# Patient Record
Sex: MALE | Race: WHITE | NOT HISPANIC OR LATINO | Employment: FULL TIME | URBAN - METROPOLITAN AREA
[De-identification: names, ages, dates, MRNs, and addresses within clinical notes are randomized per-mention and may not be internally consistent; named-entity substitution may affect disease eponyms.]

---

## 2017-06-12 ENCOUNTER — HISTORICAL (OUTPATIENT)
Dept: ADMINISTRATIVE | Facility: HOSPITAL | Age: 52
End: 2017-06-12

## 2017-06-12 LAB
ALBUMIN SERPL-MCNC: 4 GM/DL (ref 3.4–5)
ALBUMIN/GLOB SERPL: 1.3 {RATIO}
ALP SERPL-CCNC: 80 UNIT/L (ref 50–136)
ALT SERPL-CCNC: 67 UNIT/L (ref 12–78)
AST SERPL-CCNC: 22 UNIT/L (ref 15–37)
BILIRUB SERPL-MCNC: 0.6 MG/DL (ref 0.2–1)
BILIRUBIN DIRECT+TOT PNL SERPL-MCNC: 0.2 MG/DL (ref 0–0.2)
BILIRUBIN DIRECT+TOT PNL SERPL-MCNC: 0.4 MG/DL (ref 0–0.8)
BUN SERPL-MCNC: 15 MG/DL (ref 7–18)
CALCIUM SERPL-MCNC: 9 MG/DL (ref 8.5–10.1)
CHLORIDE SERPL-SCNC: 104 MMOL/L (ref 98–107)
CHOLEST SERPL-MCNC: 179 MG/DL (ref 0–200)
CHOLEST/HDLC SERPL: 2.8 {RATIO} (ref 0–5)
CO2 SERPL-SCNC: 26 MMOL/L (ref 21–32)
CREAT SERPL-MCNC: 1.11 MG/DL (ref 0.7–1.3)
EST. AVERAGE GLUCOSE BLD GHB EST-MCNC: 140 MG/DL
GLOBULIN SER-MCNC: 3.1 GM/DL (ref 2.4–3.5)
GLUCOSE SERPL-MCNC: 173 MG/DL (ref 74–106)
HBA1C MFR BLD: 6.5 % (ref 4.2–6.3)
HDLC SERPL-MCNC: 64 MG/DL (ref 35–60)
LDLC SERPL CALC-MCNC: 100 MG/DL (ref 0–129)
POTASSIUM SERPL-SCNC: 4.5 MMOL/L (ref 3.5–5.1)
PROT SERPL-MCNC: 7.1 GM/DL (ref 6.4–8.2)
SODIUM SERPL-SCNC: 137 MMOL/L (ref 136–145)
TRIGL SERPL-MCNC: 76 MG/DL (ref 30–150)
VLDLC SERPL CALC-MCNC: 15 MG/DL

## 2018-02-12 ENCOUNTER — HOSPITAL ENCOUNTER (OUTPATIENT)
Dept: EMERGENCY MEDICINE | Facility: HOSPITAL | Age: 53
End: 2018-02-12
Attending: INTERNAL MEDICINE | Admitting: INTERNAL MEDICINE

## 2018-02-12 LAB
ABS NEUT (OLG): 5.27 X10(3)/MCL (ref 2.1–9.2)
ALBUMIN SERPL-MCNC: 3.9 GM/DL (ref 3.4–5)
ALBUMIN/GLOB SERPL: 1.1 RATIO (ref 1.1–2)
ALP SERPL-CCNC: 73 UNIT/L (ref 50–136)
ALT SERPL-CCNC: 64 UNIT/L (ref 12–78)
AST SERPL-CCNC: 27 UNIT/L (ref 15–37)
BASOPHILS # BLD AUTO: 0.1 X10(3)/MCL (ref 0–0.2)
BASOPHILS NFR BLD AUTO: 1 %
BILIRUB SERPL-MCNC: 1.1 MG/DL (ref 0.2–1)
BILIRUBIN DIRECT+TOT PNL SERPL-MCNC: 0.1 MG/DL (ref 0–0.5)
BILIRUBIN DIRECT+TOT PNL SERPL-MCNC: 1 MG/DL (ref 0–0.8)
BUN SERPL-MCNC: 12 MG/DL (ref 7–18)
CALCIUM SERPL-MCNC: 9.4 MG/DL (ref 8.5–10.1)
CHLORIDE SERPL-SCNC: 100 MMOL/L (ref 98–107)
CK SERPL-CCNC: 123 UNIT/L (ref 39–308)
CO2 SERPL-SCNC: 29 MMOL/L (ref 21–32)
CREAT SERPL-MCNC: 0.93 MG/DL (ref 0.7–1.3)
EOSINOPHIL # BLD AUTO: 0.3 X10(3)/MCL (ref 0–0.9)
EOSINOPHIL NFR BLD AUTO: 3 %
ERYTHROCYTE [DISTWIDTH] IN BLOOD BY AUTOMATED COUNT: 13.3 % (ref 11.5–17)
GLOBULIN SER-MCNC: 3.7 GM/DL (ref 2.4–3.5)
GLUCOSE SERPL-MCNC: 133 MG/DL (ref 74–106)
HCT VFR BLD AUTO: 44.7 % (ref 42–52)
HGB BLD-MCNC: 15.1 GM/DL (ref 14–18)
LYMPHOCYTES # BLD AUTO: 1.6 X10(3)/MCL (ref 0.6–4.6)
LYMPHOCYTES NFR BLD AUTO: 20 %
MCH RBC QN AUTO: 29.7 PG (ref 27–31)
MCHC RBC AUTO-ENTMCNC: 33.8 GM/DL (ref 33–36)
MCV RBC AUTO: 87.8 FL (ref 80–94)
MONOCYTES # BLD AUTO: 0.7 X10(3)/MCL (ref 0.1–1.3)
MONOCYTES NFR BLD AUTO: 9 %
NEUTROPHILS # BLD AUTO: 5.27 X10(3)/MCL (ref 2.1–9.2)
NEUTROPHILS NFR BLD AUTO: 67 %
PLATELET # BLD AUTO: 249 X10(3)/MCL (ref 130–400)
PMV BLD AUTO: 10.5 FL (ref 9.4–12.4)
POC TROPONIN: 0 NG/ML (ref 0–0.08)
POC TROPONIN: 0.01 NG/ML (ref 0–0.08)
POTASSIUM SERPL-SCNC: 3.7 MMOL/L (ref 3.5–5.1)
PROT SERPL-MCNC: 7.6 GM/DL (ref 6.4–8.2)
RBC # BLD AUTO: 5.09 X10(6)/MCL (ref 4.7–6.1)
SODIUM SERPL-SCNC: 136 MMOL/L (ref 136–145)
TROPONIN I SERPL-MCNC: 0.02 NG/ML (ref 0.02–0.49)
WBC # SPEC AUTO: 7.9 X10(3)/MCL (ref 4.5–11.5)

## 2018-02-26 ENCOUNTER — HISTORICAL (OUTPATIENT)
Dept: LAB | Facility: HOSPITAL | Age: 53
End: 2018-02-26

## 2018-02-26 LAB
BILIRUB SERPL-MCNC: NEGATIVE MG/DL
BLOOD URINE, POC: NEGATIVE
CLARITY, POC UA: CLEAR
COLOR, POC UA: YELLOW
CREAT UR-MCNC: 32.9 MG/DL
GLUCOSE UR QL STRIP: NORMAL
KETONES UR QL STRIP: NEGATIVE
LEUKOCYTE EST, POC UA: NEGATIVE
MICROALBUMIN UR-MCNC: <0.5 MG/DL
MICROALBUMIN/CREAT RATIO PNL UR: <15.2 MG/GM CR (ref 0–30)
NITRITE, POC UA: NEGATIVE
PH, POC UA: 5
PROTEIN, POC: NEGATIVE
SPECIFIC GRAVITY, POC UA: 1
UROBILINOGEN, POC UA: NORMAL

## 2018-10-01 ENCOUNTER — HISTORICAL (OUTPATIENT)
Dept: LAB | Facility: HOSPITAL | Age: 53
End: 2018-10-01

## 2018-10-01 LAB
BUN SERPL-MCNC: 18 MG/DL (ref 7–18)
CALCIUM SERPL-MCNC: 9.1 MG/DL (ref 8.5–10.1)
CHLORIDE SERPL-SCNC: 105 MMOL/L (ref 98–107)
CO2 SERPL-SCNC: 27 MMOL/L (ref 21–32)
CREAT SERPL-MCNC: 1.12 MG/DL (ref 0.7–1.3)
CREAT/UREA NIT SERPL: 16.1
GLUCOSE SERPL-MCNC: 171 MG/DL (ref 74–106)
POTASSIUM SERPL-SCNC: 4.5 MMOL/L (ref 3.5–5.1)
SODIUM SERPL-SCNC: 139 MMOL/L (ref 136–145)

## 2019-02-19 ENCOUNTER — HISTORICAL (OUTPATIENT)
Dept: LAB | Facility: HOSPITAL | Age: 54
End: 2019-02-19

## 2019-02-19 LAB
ABS NEUT (OLG): 3.32 X10(3)/MCL (ref 2.1–9.2)
ALBUMIN SERPL-MCNC: 4.1 GM/DL (ref 3.4–5)
ALBUMIN/GLOB SERPL: 1.2 RATIO (ref 1.1–2)
ALP SERPL-CCNC: 82 UNIT/L (ref 46–116)
ALT SERPL-CCNC: 50 UNIT/L (ref 12–78)
APPEARANCE, UA: CLEAR
AST SERPL-CCNC: 25 UNIT/L (ref 15–37)
BACTERIA SPEC CULT: ABNORMAL
BASOPHILS # BLD AUTO: 0 X10(3)/MCL (ref 0–0.2)
BASOPHILS NFR BLD AUTO: 1 %
BILIRUB SERPL-MCNC: 0.8 MG/DL (ref 0.2–1)
BILIRUB UR QL STRIP: NEGATIVE
BILIRUBIN DIRECT+TOT PNL SERPL-MCNC: 0.2 MG/DL (ref 0–0.2)
BILIRUBIN DIRECT+TOT PNL SERPL-MCNC: 0.6 MG/DL (ref 0–0.8)
BUN SERPL-MCNC: 17.7 MG/DL (ref 7–18)
CALCIUM SERPL-MCNC: 9.4 MG/DL (ref 8.5–10.1)
CHLORIDE SERPL-SCNC: 101 MMOL/L (ref 98–107)
CHOLEST SERPL-MCNC: 175 MG/DL (ref 0–200)
CHOLEST/HDLC SERPL: 2.8 {RATIO} (ref 0–5)
CO2 SERPL-SCNC: 25.1 MMOL/L (ref 21–32)
COLOR UR: YELLOW
CREAT SERPL-MCNC: 1.05 MG/DL (ref 0.6–1.3)
CREAT UR-MCNC: 60.9 MG/DL (ref 30–125)
CRP SERPL-MCNC: <0.2 MG/DL (ref 0–0.9)
EOSINOPHIL # BLD AUTO: 0.2 X10(3)/MCL (ref 0–0.9)
EOSINOPHIL NFR BLD AUTO: 4 %
ERYTHROCYTE [DISTWIDTH] IN BLOOD BY AUTOMATED COUNT: 13.3 % (ref 11.5–17)
ERYTHROCYTE [SEDIMENTATION RATE] IN BLOOD: 6 MM/HR (ref 0–15)
EST. AVERAGE GLUCOSE BLD GHB EST-MCNC: 134 MG/DL
GLOBULIN SER-MCNC: 3.3 GM/DL (ref 2.4–3.5)
GLUCOSE (UA): >=1000
GLUCOSE SERPL-MCNC: 171 MG/DL (ref 74–106)
HBA1C MFR BLD: 6.3 % (ref 4.5–6.2)
HCT VFR BLD AUTO: 46.8 % (ref 42–52)
HDLC SERPL-MCNC: 62 MG/DL (ref 40–60)
HGB BLD-MCNC: 15.5 GM/DL (ref 14–18)
HGB UR QL STRIP: NEGATIVE
IMM GRANULOCYTES # BLD AUTO: 0.04 % (ref 0–0.02)
IMM GRANULOCYTES NFR BLD AUTO: 0.8 % (ref 0–0.43)
KETONES UR QL STRIP: NEGATIVE
LDLC SERPL CALC-MCNC: 103 MG/DL (ref 0–129)
LEUKOCYTE ESTERASE UR QL STRIP: NEGATIVE
LYMPHOCYTES # BLD AUTO: 0.8 X10(3)/MCL (ref 0.6–4.6)
LYMPHOCYTES NFR BLD AUTO: 16 %
MCH RBC QN AUTO: 30.3 PG (ref 27–31)
MCHC RBC AUTO-ENTMCNC: 33.1 GM/DL (ref 33–36)
MCV RBC AUTO: 91.6 FL (ref 80–94)
MICROALBUMIN UR-MCNC: 6 MG/DL (ref 0–3)
MICROALBUMIN/CREAT RATIO PNL UR: 98.5 MG/GM CR (ref 0–30)
MONOCYTES # BLD AUTO: 0.5 X10(3)/MCL (ref 0.1–1.3)
MONOCYTES NFR BLD AUTO: 9 %
NEUTROPHILS # BLD AUTO: 3.32 X10(3)/MCL (ref 1.4–7.9)
NEUTROPHILS NFR BLD AUTO: 68 %
NITRITE UR QL STRIP: NEGATIVE
PH UR STRIP: 6 [PH] (ref 5–9)
PLATELET # BLD AUTO: 242 X10(3)/MCL (ref 130–400)
PMV BLD AUTO: 10.2 FL (ref 9.4–12.4)
POTASSIUM SERPL-SCNC: 4.3 MMOL/L (ref 3.5–5.1)
PROT SERPL-MCNC: 7.4 GM/DL (ref 6.4–8.2)
PROT UR QL STRIP: NEGATIVE
PSA SERPL-MCNC: 0.63 NG/ML (ref 0–4)
RBC # BLD AUTO: 5.11 X10(6)/MCL (ref 4.7–6.1)
RBC #/AREA URNS HPF: ABNORMAL /[HPF]
SODIUM SERPL-SCNC: 138 MMOL/L (ref 136–145)
SP GR UR STRIP: 1.02 (ref 1–1.03)
SQUAMOUS EPITHELIAL, UA: ABNORMAL
TRIGL SERPL-MCNC: 49 MG/DL
TSH SERPL-ACNC: 3.87 MIU/ML (ref 0.36–3.74)
UROBILINOGEN UR STRIP-ACNC: 0.2
VLDLC SERPL CALC-MCNC: 10 MG/DL
WBC # SPEC AUTO: 4.9 X10(3)/MCL (ref 4.5–11.5)
WBC #/AREA URNS HPF: ABNORMAL /[HPF]

## 2019-03-15 ENCOUNTER — HISTORICAL (OUTPATIENT)
Dept: LAB | Facility: HOSPITAL | Age: 54
End: 2019-03-15

## 2019-03-20 LAB — FINAL CULTURE: NO GROWTH

## 2019-04-30 ENCOUNTER — HISTORICAL (OUTPATIENT)
Dept: LAB | Facility: HOSPITAL | Age: 54
End: 2019-04-30

## 2019-04-30 LAB
APPEARANCE, UA: CLEAR
BILIRUB UR QL STRIP: NEGATIVE
CHOLEST SERPL-MCNC: 158 MG/DL (ref 0–200)
CHOLEST/HDLC SERPL: 2.7 {RATIO} (ref 0–5)
COLOR UR: YELLOW
CREAT UR-MCNC: 97.7 MG/DL (ref 30–125)
EST. AVERAGE GLUCOSE BLD GHB EST-MCNC: 146 MG/DL
GLUCOSE (UA): 500
HBA1C MFR BLD: 6.7 % (ref 4.5–6.2)
HDLC SERPL-MCNC: 58 MG/DL (ref 40–60)
HGB UR QL STRIP: NEGATIVE
KETONES UR QL STRIP: NEGATIVE
LDLC SERPL CALC-MCNC: 89 MG/DL (ref 0–129)
LEUKOCYTE ESTERASE UR QL STRIP: NEGATIVE
MICROALBUMIN UR-MCNC: 4.1 MG/DL (ref 0–3)
MICROALBUMIN/CREAT RATIO PNL UR: 42 MG/GM CR (ref 0–30)
NITRITE UR QL STRIP: NEGATIVE
PH UR STRIP: 5.5 [PH] (ref 5–9)
PROT UR QL STRIP: NEGATIVE
SP GR UR STRIP: 1.02 (ref 1–1.03)
TRIGL SERPL-MCNC: 56 MG/DL
UROBILINOGEN UR STRIP-ACNC: 0.2
VLDLC SERPL CALC-MCNC: 11 MG/DL

## 2019-12-19 ENCOUNTER — HISTORICAL (OUTPATIENT)
Dept: LAB | Facility: HOSPITAL | Age: 54
End: 2019-12-19

## 2019-12-19 LAB
ALBUMIN SERPL-MCNC: 4 GM/DL (ref 3.4–5)
ALP SERPL-CCNC: 108 UNIT/L (ref 46–116)
ALT SERPL-CCNC: 53 UNIT/L (ref 12–78)
APPEARANCE, UA: CLEAR
AST SERPL-CCNC: 26 UNIT/L (ref 15–37)
BACTERIA SPEC CULT: NORMAL
BILIRUB SERPL-MCNC: 0.6 MG/DL (ref 0.2–1)
BILIRUB UR QL STRIP: NEGATIVE
BILIRUBIN DIRECT+TOT PNL SERPL-MCNC: 0.19 MG/DL (ref 0–0.2)
BILIRUBIN DIRECT+TOT PNL SERPL-MCNC: 0.41 MG/DL (ref 0–0.8)
BUN SERPL-MCNC: 14.1 MG/DL (ref 7–18)
CALCIUM SERPL-MCNC: 9.5 MG/DL (ref 8.5–10.1)
CHLORIDE SERPL-SCNC: 102 MMOL/L (ref 98–107)
CHOLEST SERPL-MCNC: 189 MG/DL (ref 0–200)
CHOLEST/HDLC SERPL: 3.2 {RATIO} (ref 0–5)
CO2 SERPL-SCNC: 29.1 MMOL/L (ref 21–32)
COLOR UR: YELLOW
CREAT SERPL-MCNC: 1.1 MG/DL (ref 0.6–1.3)
CREAT UR-MCNC: 64.1 MG/DL (ref 30–125)
CREAT/UREA NIT SERPL: 13
CRP SERPL-MCNC: <0.2 MG/DL (ref 0–0.9)
ERYTHROCYTE [DISTWIDTH] IN BLOOD BY AUTOMATED COUNT: 13.1 % (ref 11.5–17)
ERYTHROCYTE [SEDIMENTATION RATE] IN BLOOD: 5 MM/HR (ref 0–15)
EST. AVERAGE GLUCOSE BLD GHB EST-MCNC: 154 MG/DL
GLUCOSE (UA): >=1000
GLUCOSE SERPL-MCNC: 217 MG/DL (ref 74–106)
HBA1C MFR BLD: 7 % (ref 4.5–6.2)
HCT VFR BLD AUTO: 49.2 % (ref 42–52)
HDLC SERPL-MCNC: 59 MG/DL (ref 40–60)
HGB BLD-MCNC: 16.2 GM/DL (ref 14–18)
HGB UR QL STRIP: NEGATIVE
KETONES UR QL STRIP: NEGATIVE
LDLC SERPL CALC-MCNC: 116 MG/DL (ref 0–129)
LEUKOCYTE ESTERASE UR QL STRIP: NEGATIVE
MCH RBC QN AUTO: 29.8 PG (ref 27–31)
MCHC RBC AUTO-ENTMCNC: 32.9 GM/DL (ref 33–36)
MCV RBC AUTO: 90.4 FL (ref 80–94)
MICROALBUMIN UR-MCNC: 8.6 MG/DL (ref 0–3)
MICROALBUMIN/CREAT RATIO PNL UR: 134.2 MG/GM CR (ref 0–30)
NITRITE UR QL STRIP: NEGATIVE
PH UR STRIP: 5.5 [PH] (ref 5–9)
PLATELET # BLD AUTO: 241 X10(3)/MCL (ref 130–400)
PMV BLD AUTO: 10 FL (ref 9.4–12.4)
POTASSIUM SERPL-SCNC: 5.3 MMOL/L (ref 3.5–5.1)
PROT SERPL-MCNC: 8 GM/DL (ref 6.4–8.2)
PROT UR QL STRIP: NEGATIVE
RBC # BLD AUTO: 5.44 X10(6)/MCL (ref 4.7–6.1)
RBC #/AREA URNS HPF: NORMAL /[HPF]
SODIUM SERPL-SCNC: 138 MMOL/L (ref 136–145)
SP GR UR STRIP: 1.01 (ref 1–1.03)
SQUAMOUS EPITHELIAL, UA: NORMAL
TRIGL SERPL-MCNC: 72 MG/DL
UROBILINOGEN UR STRIP-ACNC: 0.2
VLDLC SERPL CALC-MCNC: 14 MG/DL
WBC # SPEC AUTO: 7.1 X10(3)/MCL (ref 4.5–11.5)
WBC #/AREA URNS HPF: NORMAL /[HPF]

## 2020-01-27 ENCOUNTER — HISTORICAL (OUTPATIENT)
Dept: RADIOLOGY | Facility: HOSPITAL | Age: 55
End: 2020-01-27

## 2020-05-07 ENCOUNTER — HISTORICAL (OUTPATIENT)
Dept: RADIOLOGY | Facility: HOSPITAL | Age: 55
End: 2020-05-07

## 2020-05-07 LAB
ABS NEUT (OLG): 4 X10(3)/MCL (ref 2.1–9.2)
ALBUMIN SERPL-MCNC: 4.2 GM/DL (ref 3.4–5)
ALBUMIN/GLOB SERPL: 1.3 RATIO (ref 1.1–2)
ALP SERPL-CCNC: 84 UNIT/L (ref 46–116)
ALT SERPL-CCNC: 37 UNIT/L (ref 12–78)
AST SERPL-CCNC: 16 UNIT/L (ref 15–37)
BASOPHILS # BLD AUTO: 0 X10(3)/MCL (ref 0–0.2)
BASOPHILS NFR BLD AUTO: 1 %
BILIRUB SERPL-MCNC: 0.6 MG/DL (ref 0.2–1)
BILIRUBIN DIRECT+TOT PNL SERPL-MCNC: 0.22 MG/DL (ref 0–0.2)
BILIRUBIN DIRECT+TOT PNL SERPL-MCNC: 0.38 MG/DL (ref 0–0.8)
BUN SERPL-MCNC: 18.1 MG/DL (ref 7–18)
CALCIUM SERPL-MCNC: 10.1 MG/DL (ref 8.5–10.1)
CHLORIDE SERPL-SCNC: 103 MMOL/L (ref 98–107)
CO2 SERPL-SCNC: 28.3 MMOL/L (ref 21–32)
CREAT SERPL-MCNC: 1.1 MG/DL (ref 0.6–1.3)
EOSINOPHIL # BLD AUTO: 0.3 X10(3)/MCL (ref 0–0.9)
EOSINOPHIL NFR BLD AUTO: 5 %
ERYTHROCYTE [DISTWIDTH] IN BLOOD BY AUTOMATED COUNT: 12.8 % (ref 11.5–17)
EST. AVERAGE GLUCOSE BLD GHB EST-MCNC: 148 MG/DL
GLOBULIN SER-MCNC: 3.2 GM/DL (ref 2.4–3.5)
GLUCOSE SERPL-MCNC: 167 MG/DL (ref 74–106)
HBA1C MFR BLD: 6.8 % (ref 4.5–6.2)
HCT VFR BLD AUTO: 47.9 % (ref 42–52)
HGB BLD-MCNC: 16.1 GM/DL (ref 14–18)
IMM GRANULOCYTES # BLD AUTO: 0.04 % (ref 0–0.02)
IMM GRANULOCYTES NFR BLD AUTO: 0.7 % (ref 0–0.43)
LYMPHOCYTES # BLD AUTO: 1.1 X10(3)/MCL (ref 0.6–4.6)
LYMPHOCYTES NFR BLD AUTO: 18 %
MCH RBC QN AUTO: 30.1 PG (ref 27–31)
MCHC RBC AUTO-ENTMCNC: 33.6 GM/DL (ref 33–36)
MCV RBC AUTO: 89.7 FL (ref 80–94)
MONOCYTES # BLD AUTO: 0.6 X10(3)/MCL (ref 0.1–1.3)
MONOCYTES NFR BLD AUTO: 10 %
NEUTROPHILS # BLD AUTO: 4 X10(3)/MCL (ref 1.4–7.9)
NEUTROPHILS NFR BLD AUTO: 66 %
PLATELET # BLD AUTO: 204 X10(3)/MCL (ref 130–400)
PMV BLD AUTO: 10.6 FL (ref 9.4–12.4)
POTASSIUM SERPL-SCNC: 5.3 MMOL/L (ref 3.5–5.1)
PROT SERPL-MCNC: 7.4 GM/DL (ref 6.4–8.2)
RBC # BLD AUTO: 5.34 X10(6)/MCL (ref 4.7–6.1)
SODIUM SERPL-SCNC: 140 MMOL/L (ref 136–145)
WBC # SPEC AUTO: 6.1 X10(3)/MCL (ref 4.5–11.5)

## 2020-05-26 ENCOUNTER — HISTORICAL (OUTPATIENT)
Dept: ADMINISTRATIVE | Facility: HOSPITAL | Age: 55
End: 2020-05-26

## 2020-08-10 ENCOUNTER — HISTORICAL (OUTPATIENT)
Dept: ADMINISTRATIVE | Facility: HOSPITAL | Age: 55
End: 2020-08-10

## 2020-08-10 LAB
APPEARANCE, UA: CLEAR
BACTERIA SPEC CULT: ABNORMAL /HPF
BILIRUB UR QL STRIP: NEGATIVE
BUN SERPL-MCNC: 10.8 MG/DL (ref 8.4–25.7)
CALCIUM SERPL-MCNC: 9.4 MG/DL (ref 8.4–10.2)
CHLORIDE SERPL-SCNC: 104 MMOL/L (ref 98–107)
CHOLEST SERPL-MCNC: 153 MG/DL
CHOLEST/HDLC SERPL: 3 {RATIO} (ref 0–5)
CO2 SERPL-SCNC: 24 MMOL/L (ref 22–29)
COLOR UR: YELLOW
CREAT SERPL-MCNC: 0.96 MG/DL (ref 0.73–1.18)
CREAT UR-MCNC: 84.6 MG/DL (ref 58–161)
CREAT/UREA NIT SERPL: 11
EST. AVERAGE GLUCOSE BLD GHB EST-MCNC: 139.8 MG/DL
GLUCOSE (UA): ABNORMAL
GLUCOSE SERPL-MCNC: 127 MG/DL (ref 74–100)
HBA1C MFR BLD: 6.5 %
HDLC SERPL-MCNC: 56 MG/DL (ref 35–60)
HGB UR QL STRIP: NEGATIVE
KETONES UR QL STRIP: NEGATIVE
LDLC SERPL CALC-MCNC: 81 MG/DL (ref 50–140)
LEUKOCYTE ESTERASE UR QL STRIP: NEGATIVE
MICROALBUMIN UR-MCNC: 32.5 UG/ML
MICROALBUMIN/CREAT RATIO PNL UR: 38.4 MG/GM CR (ref 0–30)
NITRITE UR QL STRIP: NEGATIVE
PH UR STRIP: 5 [PH] (ref 5–9)
POTASSIUM SERPL-SCNC: 4.6 MMOL/L (ref 3.5–5.1)
PROT UR QL STRIP: NEGATIVE
RBC #/AREA URNS HPF: ABNORMAL /[HPF]
SODIUM SERPL-SCNC: 139 MMOL/L (ref 136–145)
SP GR UR STRIP: 1.03 (ref 1–1.03)
SQUAMOUS EPITHELIAL, UA: ABNORMAL
TRIGL SERPL-MCNC: 78 MG/DL (ref 34–140)
UROBILINOGEN UR STRIP-ACNC: 0.2
VLDLC SERPL CALC-MCNC: 16 MG/DL
WBC #/AREA URNS HPF: ABNORMAL /HPF

## 2020-10-05 ENCOUNTER — HISTORICAL (OUTPATIENT)
Dept: ADMINISTRATIVE | Facility: HOSPITAL | Age: 55
End: 2020-10-05

## 2020-10-07 LAB — FINAL CULTURE: NO GROWTH

## 2020-11-10 ENCOUNTER — HISTORICAL (OUTPATIENT)
Dept: ADMINISTRATIVE | Facility: HOSPITAL | Age: 55
End: 2020-11-10

## 2021-02-22 ENCOUNTER — HISTORICAL (OUTPATIENT)
Dept: RADIOLOGY | Facility: HOSPITAL | Age: 56
End: 2021-02-22

## 2021-02-22 LAB
ALBUMIN SERPL-MCNC: 4 GM/DL (ref 3.5–5)
ALBUMIN/GLOB SERPL: 1.5 RATIO (ref 1.1–2)
ALP SERPL-CCNC: 76 UNIT/L (ref 40–150)
ALT SERPL-CCNC: 49 UNIT/L (ref 0–55)
APPEARANCE, UA: CLEAR
AST SERPL-CCNC: 27 UNIT/L (ref 5–34)
BACTERIA SPEC CULT: ABNORMAL /HPF
BILIRUB SERPL-MCNC: 1 MG/DL
BILIRUB UR QL STRIP: NEGATIVE
BILIRUBIN DIRECT+TOT PNL SERPL-MCNC: 0.4 MG/DL (ref 0–0.5)
BILIRUBIN DIRECT+TOT PNL SERPL-MCNC: 0.6 MG/DL (ref 0–0.8)
BUN SERPL-MCNC: 13.2 MG/DL (ref 8.4–25.7)
CALCIUM SERPL-MCNC: 9 MG/DL (ref 8.4–10.2)
CHLORIDE SERPL-SCNC: 101 MMOL/L (ref 98–107)
CHOLEST SERPL-MCNC: 156 MG/DL
CHOLEST/HDLC SERPL: 3 {RATIO} (ref 0–5)
CO2 SERPL-SCNC: 27 MMOL/L (ref 22–29)
COLOR UR: YELLOW
CREAT SERPL-MCNC: 1.02 MG/DL (ref 0.73–1.18)
CREAT UR-MCNC: 167.9 MG/DL (ref 58–161)
EST. AVERAGE GLUCOSE BLD GHB EST-MCNC: 165.7 MG/DL
GLOBULIN SER-MCNC: 2.6 GM/DL (ref 2.4–3.5)
GLUCOSE (UA): ABNORMAL
GLUCOSE SERPL-MCNC: 206 MG/DL (ref 74–100)
HBA1C MFR BLD: 7.4 %
HDLC SERPL-MCNC: 50 MG/DL (ref 35–60)
HGB UR QL STRIP: NEGATIVE
KETONES UR QL STRIP: ABNORMAL
LDLC SERPL CALC-MCNC: 87 MG/DL (ref 50–140)
LEUKOCYTE ESTERASE UR QL STRIP: NEGATIVE
MICROALBUMIN UR-MCNC: 27.6 UG/ML
MICROALBUMIN/CREAT RATIO PNL UR: 16.4 MG/GM CR (ref 0–30)
NITRITE UR QL STRIP: NEGATIVE
PH UR STRIP: 5.5 [PH] (ref 5–9)
POTASSIUM SERPL-SCNC: 4.7 MMOL/L (ref 3.5–5.1)
PROT SERPL-MCNC: 6.6 GM/DL (ref 6.4–8.3)
PROT UR QL STRIP: NEGATIVE
RBC #/AREA URNS HPF: ABNORMAL /[HPF]
SODIUM SERPL-SCNC: 138 MMOL/L (ref 136–145)
SP GR UR STRIP: >=1.04 (ref 1–1.03)
SQUAMOUS EPITHELIAL, UA: ABNORMAL
TRIGL SERPL-MCNC: 97 MG/DL (ref 34–140)
UROBILINOGEN UR STRIP-ACNC: 0.2
VLDLC SERPL CALC-MCNC: 19 MG/DL
WBC #/AREA URNS HPF: ABNORMAL /HPF

## 2021-05-18 ENCOUNTER — HISTORICAL (OUTPATIENT)
Dept: ADMINISTRATIVE | Facility: HOSPITAL | Age: 56
End: 2021-05-18

## 2021-05-18 LAB
ALBUMIN SERPL-MCNC: 4.1 GM/DL (ref 3.5–5)
ALBUMIN/GLOB SERPL: 1.5 RATIO (ref 1.1–2)
ALP SERPL-CCNC: 122 UNIT/L (ref 40–150)
ALT SERPL-CCNC: 36 UNIT/L (ref 0–55)
APPEARANCE, UA: ABNORMAL
AST SERPL-CCNC: 19 UNIT/L (ref 5–34)
BACTERIA SPEC CULT: ABNORMAL /HPF
BILIRUB SERPL-MCNC: 0.9 MG/DL
BILIRUB UR QL STRIP: NEGATIVE
BILIRUBIN DIRECT+TOT PNL SERPL-MCNC: 0.4 MG/DL (ref 0–0.5)
BILIRUBIN DIRECT+TOT PNL SERPL-MCNC: 0.5 MG/DL (ref 0–0.8)
BUN SERPL-MCNC: 11.6 MG/DL (ref 8.4–25.7)
CALCIUM SERPL-MCNC: 9.4 MG/DL (ref 8.4–10.2)
CHLORIDE SERPL-SCNC: 99 MMOL/L (ref 98–107)
CO2 SERPL-SCNC: 25 MMOL/L (ref 22–29)
COLOR UR: ABNORMAL
CRC RECOMMENDATION EXT: NORMAL
CRC RECOMMENDATION EXT: NORMAL
CREAT SERPL-MCNC: 0.89 MG/DL (ref 0.73–1.18)
CREAT UR-MCNC: 273.4 MG/DL (ref 58–161)
EST. AVERAGE GLUCOSE BLD GHB EST-MCNC: 157.1 MG/DL
GLOBULIN SER-MCNC: 2.8 GM/DL (ref 2.4–3.5)
GLUCOSE (UA): ABNORMAL
GLUCOSE SERPL-MCNC: 181 MG/DL (ref 74–100)
HBA1C MFR BLD: 7.1 %
HGB UR QL STRIP: NEGATIVE
KETONES UR QL STRIP: ABNORMAL
LEUKOCYTE ESTERASE UR QL STRIP: NEGATIVE
MICROALBUMIN UR-MCNC: 69.7 UG/ML
MICROALBUMIN/CREAT RATIO PNL UR: 25.5 MG/GM CR (ref 0–30)
NITRITE UR QL STRIP: NEGATIVE
PH UR STRIP: 5 [PH] (ref 5–9)
POTASSIUM SERPL-SCNC: 4 MMOL/L (ref 3.5–5.1)
PROT SERPL-MCNC: 6.9 GM/DL (ref 6.4–8.3)
PROT UR QL STRIP: ABNORMAL
RBC #/AREA URNS HPF: ABNORMAL /[HPF]
SODIUM SERPL-SCNC: 137 MMOL/L (ref 136–145)
SP GR UR STRIP: 1.04 (ref 1–1.03)
SQUAMOUS EPITHELIAL, UA: ABNORMAL /HPF (ref 0–4)
UROBILINOGEN UR STRIP-ACNC: 0.2
WBC #/AREA URNS HPF: ABNORMAL /HPF

## 2021-07-20 ENCOUNTER — HISTORICAL (OUTPATIENT)
Dept: ADMINISTRATIVE | Facility: HOSPITAL | Age: 56
End: 2021-07-20

## 2021-07-20 LAB — POC CREATININE: 1.1 MG/DL (ref 0.6–1.3)

## 2021-12-08 LAB
LEFT EYE DM RETINOPATHY: NEGATIVE
LEFT EYE DM RETINOPATHY: NEGATIVE
RIGHT EYE DM RETINOPATHY: NEGATIVE
RIGHT EYE DM RETINOPATHY: NEGATIVE

## 2022-02-22 ENCOUNTER — HISTORICAL (OUTPATIENT)
Dept: RADIOLOGY | Facility: HOSPITAL | Age: 57
End: 2022-02-22

## 2022-02-22 ENCOUNTER — HISTORICAL (OUTPATIENT)
Dept: ADMINISTRATIVE | Facility: HOSPITAL | Age: 57
End: 2022-02-22

## 2022-04-11 ENCOUNTER — HISTORICAL (OUTPATIENT)
Dept: ADMINISTRATIVE | Facility: HOSPITAL | Age: 57
End: 2022-04-11
Payer: COMMERCIAL

## 2022-04-26 VITALS
WEIGHT: 220.44 LBS | DIASTOLIC BLOOD PRESSURE: 88 MMHG | HEIGHT: 72 IN | SYSTOLIC BLOOD PRESSURE: 132 MMHG | OXYGEN SATURATION: 98 % | BODY MASS INDEX: 29.86 KG/M2

## 2022-04-30 NOTE — OP NOTE
Patient:   Walter Crawford            MRN: [id number]            FIN: [id number]               Age:   54 years     Sex:  Male     :  1965   Associated Diagnoses:   None   Author:   Pedro Carballo MD      Preoperative Diagnosis:  Cataract Right eye    Postoperative Diagnosis:  Cataract Right eye    Procedure:  Phacoemulsification with intraocular lens implantation Right eye    Surgeon:  Pedro Carballo MD    Assistant:  JULIANN Guerrier    Anestheisa:  MAC    Complications:  None    The patient was brought to the [facility] [place] [facility].  A time out was performed.  The capsulotomy, lens fragmentation and limbal relaxing incisions were completed.  Then the patient was brought into the operating suite, where the patient was correctly identified as was the operative eye via timeout.  The patient was prepped and draped in a sterile ophthalmic fashion.  A lid speculum was placed in the operative eye and the microscope was brought into place.  A 1.0 mm paracentesis was then made at (12) o'clock.  The anterior chamber was filled with Endocoat.  A (temporal) clear corneal incision was made with a 2.4 mm keratome blade.  A 6 mm corneal marking ring was used to kathryn the cornea centered over the visual axis.  A 5.00 mm continuous curvilinear capsulorhexis was fashioned using a cystotome and microcapsular forceps.  Hydrodissection and hydrodelineation was performed with upreserved 1% Xylocaine.  The nucleus was then phacoemulsified with the Abbott phacoemulsification hand-piece with a total of (1) EFX.  The cortex was then removed with the I/A hand-piece.  The lens model (TFAT00) with a power of (19.5) was placed in the capsular bag.  The Helon was then removed from the eye with the I/A hand piece.  The anterior chamber was inflated and the wounds were hydrated with BSS.  The wounds were checked with Weck-Eliza sponges and found to be watertight.  The lid speculum was removed and topical antibiotics were placed on the  operative eye.  The patient was brought to PACU in good condition.

## 2022-04-30 NOTE — ED PROVIDER NOTES
Patient:   Walter Crawford            MRN: [id number]            FIN: [id number]               Age:   52 years     Sex:  Male     :  1965   Associated Diagnoses:   Chest pain   Author:   Ofelia Malhotra MD      Basic Information   Time seen: Date & time 2018 15:57:00.   History source: Patient.   Arrival mode: Private vehicle, walking.   History limitation: None.   Additional information: Patient's physician(s): David Pool MD, Chief Complaint from Nursing Triage Note : Chief Complaint   2018 15:25 CST      Chief Complaint           chest tightness  .      History of Present Illness   The patient presents with 53 yo cm with pmh DM, HTN presents from Dr. Pool office for cardiac workup d/t abnormal EKG in office. Pt states he has had cp x 1 month worsening x 1 week.  and     I, Dr. Malhotra, assumed care of this patient at 1631.  53 y/o CM with a history of anxiety, HTN, DM and smoking, presents to the ED with complaints of worsening, diffuse chest tightness and left-sided chest soreness, onset of 3 weeks worse since yesterday. Pt reports the pain fluctuates in intensity, is not worse with eating or movement and states he believes the pain is superficial, and not cardiac related. He says he last felt the pain driving to the ED, and denies any pain at present.  He is intermittently short of breath with the pain but not short of breath at present.  He states he is compliant with his blood pressure medications and denies taking any pain medication today. Pt denies any fever, nausea, vomiting, cough, lightheadedness or abdominal pain. .  The onset was 3  weeks ago.  The course/duration of symptoms is worsening and fluctuating in intensity.  Location: Generalized chest worse on left side. Radiating pain: none. The character of symptoms is tightness.  The degree at onset was minimal.  The degree at maximum was minimal.  The degree at present is none.  Exacerbating factors:   not movement or eating.  The relieving factor is none.  Risk factors consist of hypertension, diabetes mellitus and smoking.  Prior episodes: none.  Therapy today None.  Associated symptoms: denies nausea and denies vomiting.        Review of Systems   Constitutional symptoms:  No fever,    Skin symptoms:  No rash,    Eye symptoms:  No recent vision problems,    ENMT symptoms:  No sore throat,    Respiratory symptoms:  Shortness of breath (Intermittently, not currently), No cough,    Cardiovascular symptoms:  Chest pain, bilateral chest, mild pain, tightness, Left sided chest soreness, no syncope, no diaphoresis, no peripheral edema.    Gastrointestinal symptoms:  No abdominal pain, no nausea, no vomiting, no diarrhea, no constipation.    Genitourinary symptoms:  No dysuria, no hematuria.    Neurologic symptoms:  No headache, no dizziness.       Health Status   Allergies:    Allergic Reactions (Selected)  Severity Not Documented  Lisinopril- Eruption..   Medications:  (Selected)   Prescriptions  Prescribed  Contour Next EZ Test Strips: Contour Next EZ Test Strips, See Instructions, Check blood sugar daily, # 1 box(es), 3 Refill(s), Pharmacy: [facility] [id number]  Edarbyclor 40 mg-25 mg oral tablet: 1 tab(s), Oral, Daily, # 30 tab(s), 11 Refill(s), Pharmacy: [place], [place]  Glyxambi 10 mg-5 mg oral tablet: See Instructions, TAKE 1 TABLET BY MOUTH EVERY DAY, # 30 tab(s), 3 Refill(s), eRx: [facility] [id number]  PARoxetine 40 mg oral tablet: See Instructions, TAKE 1 TABLET BY MOUTH EVERY DAY, # 30 tab(s), 5 Refill(s), eRx: [facility] [id number]  Viagra 25 mg oral tablet: 25 mg = 1 tab(s), Oral, Daily, PRN PRN as needed for erectile dysfunction, 1 hour before sexual activity, # 10 tab(s), 0 Refill(s), Pharmacy: [facility] [id number]  atorvastatin 10 mg oral tablet: See Instructions, TAKE 1 TABLET BY MOUTH DAILY, # 30 tab(s), 5 Refill(s), eRx: [facility] [address]  buPROPion  300 mg/24 hours (XL) oral tablet, extended release: See Instructions, TAKE 1 TABLET BY MOUTH DAILY, # 30 tab(s), 3 Refill(s), eRx: [facility] [id number]  metFORMIN 1000 mg oral tablet: See Instructions, TAKE 1 TABLET BY MOUTH TWICE DAILY, # 60 tab(s), 2 Refill(s), eRx: [facility] [address]  traZODONE 50 mg oral tablet ( Desyrel ): See Instructions, TAKE 2 TABLETS BY MOUTH EVERY DAY AT BEDTIME, # 60 tab(s), 2 Refill(s), eRx: [facility] [address]  Documented Medications  Documented  cyanocobalamin: = 1 mL, Subcutaneous, qMonth, 0 Refill(s)  folic acid 1 mg oral tablet: 1 mg = 1 tab(s), Oral, Daily, # 30 tab(s), 0 Refill(s)  leflunomide 20 mg oral tablet: 20 mg = 1 tab(s), Oral, Daily, # 30 tab(s), 0 Refill(s).      Past Medical/ Family/ Social History   Medical history:    Resolved  HTN (hypertension) ([id number]):  Resolved.  DM (diabetes mellitus), type 2 ([id number]):  Resolved.  Depression ([id number]):  Resolved..   Surgical history:    Left ankle ([id number]) in  at 50 Years.  Gallbladder (001695682) on 2012 at 46 Years.  Carpal tunnel (572455673) in the month of 2012 at 46 Years.  Comments:  2015 16:37 - Contributor_system, [id number]_[email or web address]  2014 10:18:14 - Sujata Canres: both wrist  Colonoscopy (046369705) in  at 44 Years..   Family history:    Dementia  Father  Diabetes mellitus type 2  Mother (2, )  Depression  Sister  Hypertension.  Father  Diabetes mellitus.  Sister  .   Social history: Alcohol use: Regularly, Tobacco use: 20 cigarettes per day, Drug use: Denies.      Physical Examination               Vital Signs   Vital Signs   2018 15:25 CST      Temperature Oral          36.7 DegC                             Temperature Oral (calculated)             98.06 DegF                             Peripheral Pulse Rate     81 bpm                             Respiratory Rate          16 br/min                              SpO2                      96 %                             Systolic Blood Pressure   123 mmHg                             Diastolic Blood Pressure  81 mmHg  .   Measurements   2/12/2018 15:25 CST      Weight Dosing             105.25 kg                             Weight Measured           105.25 kg                             Weight Measured and Calculated in Lbs     232.03 lb                             Weight Estimated          105.25 kg                             Height/Length Dosing      177.80 cm                             Height/Length Measured    177.80 cm                             Height/Length Estimated   177.80 cm                             BSA Measured              2.28 m2                             BSA Estimated             2.28 m2                             Body Mass Index Estimated 33.29 kg/m2                             Body Mass Index Measured  33.29 kg/m2  .   Basic Oxygen Information   2/12/2018 15:25 CST      SpO2                      96 %  .   General:  Alert, no acute distress.    Skin:  Warm, dry, pink, intact.    Head:  Normocephalic, atraumatic.    Neck:  Supple, trachea midline.    Eye:  Extraocular movements are intact, normal conjunctiva.    Ears, nose, mouth and throat:  Oral mucosa moist.   Cardiovascular:  Regular rate and rhythm, Normal peripheral perfusion, No edema.    Respiratory:  Lungs are clear to auscultation, respirations are non-labored, breath sounds are equal, Symmetrical chest wall expansion.    Gastrointestinal:  Soft, Nontender, Non distended, Normal bowel sounds.    Back:  Normal range of motion.   Musculoskeletal:  Normal ROM, No calf edema, asymmetry, erythema, warmth, tenderness to palpation or palpable cords appreciated bilaterally.    Neurological:  Alert and oriented to person, place, time, and situation, No focal neurological deficit observed.    Psychiatric:  Cooperative.      Medical Decision Making   Differential Diagnosis:  Unstable angina,  angina, anxiety, atypical chest pain, pneumonia, gastroesophageal reflux disease, chronic obstructive pulmonary disease, bronchitis.    Rationale:  52-year-old male with history of hypertension and diabetes, smoker, presents for evaluation of bilateral chest tightness with intermittent shortness of breath.  Symptoms overall have been intermittent for several days.  He has never had a cardiac workup.  He is currently chest pain-free.  EKG sinus rhythm without acute ischemic changes.  Chest x-ray negative on my review for acute findings.  Screening cardiac workup initiated and is negative for acute abnormalities.  There is a mild elevation in bilirubin of unknown significance.  It will be followed as an inpatient.  He is admitted.   Documents reviewed:  Emergency department nurses' notes.   Orders  Launch Orders   Laboratory:  CPK (Order): Stat collect, 2/12/2018 16:00 CST, Blood, Lab Collect, Print Label By Order Location, 2/12/2018 16:00 CST  CBC w/ Auto Diff (Order): Now collect, 2/12/2018 16:00 CST, Blood, Lab Collect, Print Label By Order Location, 2/12/2018 16:00 CST  CMP (Order): Stat collect, 2/12/2018 16:00 CST, Blood, Lab Collect, Print Label By Order Location, 2/12/2018 16:00 CST  Troponin-I (Order): Stat collect, 2/12/2018 16:00 CST, Blood, Lab Collect, Print Label By Order Location, 2/12/2018 16:00 CST  POC iSTAT ER Troponin request (Order): Blood, Stat collect, Collected, 2/12/2018 16:00 CST by Adore Hernandez, Nurse collect, Print Label By Order Location  Radiology:  XR Chest 2 Views (Order): Stat, 2/12/2018 16:00 CST, Chest Pain, None, Stretcher, Rad Type, [facility]  Cardiology:  EKG Adult 12 Lead (Order): 2/12/2018 16:00 CST, -1, -1, 2/12/2018 16:00 CST.   Electrocardiogram:  Time 2/12/2018 16:00:00, rate 82, normal sinus rhythm, No ST-T changes, no ectopy, normal TN & QRS intervals, EP Interp.    Results review:  Lab results : Lab View   2/12/2018 16:17 CST      POC Troponin               0.01 ng/mL    2/12/2018 16:08 CST      Sodium Lvl                136 mmol/L                             Potassium Lvl             3.7 mmol/L                             Chloride                  100 mmol/L                             CO2                       29.0 mmol/L                             Calcium Lvl               9.4 mg/dL                             Glucose Lvl               133 mg/dL  HI                             BUN                       12.0 mg/dL                             Creatinine                0.93 mg/dL                             eGFR-AA                   >60 mL/min/1.73 m2  NA                             eGFR-LEO                  >60 mL/min/1.73 m2  NA                             Bili Total                1.1 mg/dL  HI                             Bili Direct               0.10 mg/dL                             Bili Indirect             1.00 mg/dL  HI                             AST                       27 unit/L                             ALT                       64 unit/L                             Alk Phos                  73 unit/L                             Total Protein             7.6 gm/dL                             Albumin Lvl               3.90 gm/dL                             Globulin                  3.70 gm/dL  HI                             A/G Ratio                 1.1 ratio                             Total CK                  123 unit/L                             Troponin-I                0.02 ng/mL                             WBC                       7.9 x10(3)/mcL                             RBC                       5.09 x10(6)/mcL                             Hgb                       15.1 gm/dL                             Hct                       44.7 %                             Platelet                  249 x10(3)/mcL                             MCV                       87.8 fL                             MCH                       29.7 pg                              MCHC                      33.8 gm/dL                             RDW                       13.3 %                             MPV                       10.5 fL                             Abs Neut                  5.27 x10(3)/mcL                             Neutro Auto               67 %  NA                             Lymph Auto                20 %  NA                             Mono Auto                 9 %  NA                             Eos Auto                  3 %  NA                             Abs Eos                   0.3 x10(3)/mcL                             Basophil Auto             1 %  NA                             Abs Neutro                5.27 x10(3)/mcL                             Abs Lymph                 1.6 x10(3)/mcL                             Abs Mono                  0.7 x10(3)/mcL                             Abs Baso                  0.1 x10(3)/mcL  .   Chest X-Ray:  Time reported 2/12/2018 16:17:00, no acute disease process, interpretation by Emergency Physician,   Reviewed by radiology              * Final Report *    Reason For Exam  Chest Pain    Radiology Report     CHEST X-RAYS (2 Views):     CPT: [id number]     HISTORY:  Chest Pain     FINDINGS:  Examination reveals mediastinal and cardiac silhouettes to be within  normal limits. Lung fields are clear and free of gross infiltrates  atelectases or effusions.     IMPRESSION: No active pulmonary disease       Signature Line  Electronically Signed By: Mello SHELTON Wilber  Date/Time Signed: 02/12/2018 16:17  .       Reexamination/ Reevaluation   Time: 2/12/2018 18:00:00 .   Vital signs   Basic Oxygen Information   2/12/2018 15:25 CST      SpO2                      96 %     Course: unchanged.   Pain status: resolved.   Assessment: exam unchanged.   Notes: Patient has remained chest pain-free since arrival in the emergency department.  Cardiac workup is essentially negative, however considering his risk factors and intermittent  chest pain with shortness of breath, I did contact his primary care physician, spoke to Dr. Tejada, who agrees with admission for serial troponins and cardiology consultation.  Patient is amenable to admission.  He has received aspirin.      Impression and Plan   Diagnosis   Chest pain (PNED [id number])      Calls-Consults   -  2/12/2018 18:00:00 .   Plan   Condition: Improved, Stable.    Disposition: Admit time  2/12/2018 18:22:00, Place in Observation Telemetry Unit, David Pool MD.    Counseled: Patient, Regarding diagnosis, Regarding diagnostic results, Regarding treatment plan, Patient indicated understanding of instructions.    Notes: I, Camden Govea, acted solely as a scribe for and in the presence of Dr. Malhotra who performed the service. , I, Dr. Ofelia Malhotra, personally evaluated and examined this patient and agree with the documentation as above. .

## 2022-04-30 NOTE — OP NOTE
Patient:   Walter Crawford            MRN: [id number]            FIN: [id number]               Age:   54 years     Sex:  Male     :  1965   Associated Diagnoses:   None   Author:   Pedro Carballo MD      Preoperative Diagnosis:  Cataract Left eye    Postoperative Diagnosis:  Cataract  Left eye    Procedure:  Phacoemulsification with intraocular lens implantation  Left eye    Surgeon:  Pedro Carbalol MD    Assistant:  JULIANN Roca    Anestheisa:  MAC    Complications:  None    The patient was brought to the [facility] laser.  A time out was performed.  The capsulotomy, lens fragmentation and limbal relaxing incisions were completed.  Then the patient was brought into the operating suite, where the patient was correctly identified as was the operative eye via timeout.  The patient was prepped and draped in a sterile ophthalmic fashion.  A lid speculum was placed in the operative eye and the microscope was brought into place.  A 1.0 mm paracentesis was then made at (_6_) o'clock.  The anterior chamber was filled with Endocoat.  A (temporal) clear corneal incision was made with a 2.4 mm keratome blade.  A 6 mm corneal marking ring was used to kathryn the cornea centered over the visual axis.  A 5.00 mm continuous curvilinear capsulorhexis was fashioned using a cystotome and microcapsular forceps.  Hydrodissection and hydrodelineation was performed with upreserved 1% Xylocaine.  The nucleus was then phacoemulsified with the Abbott phacoemulsification hand-piece with a total of (_1_) EFX.  The cortex was then removed with the I/A hand-piece.  The lens model (_TFAT00_) with a power of (_19.0_) was placed in the capsular bag.  The Helon was then removed from the eye with the I/A hand piece.  The anterior chamber was inflated and the wounds were hydrated with BSS.  The wounds were checked with Weck-Eliza sponges and found to be watertight.  The lid speculum was removed and topical antibiotics were placed on the  operative eye.  The patient was brought to PACU in good condition.          11/10/2020 @ \[facility]\""P

## 2022-05-09 RX ORDER — OLMESARTAN MEDOXOMIL AND HYDROCHLOROTHIAZIDE 20/12.5 20; 12.5 MG/1; MG/1
1 TABLET ORAL DAILY
Qty: 90 TABLET | Refills: 3 | Status: SHIPPED | OUTPATIENT
Start: 2022-05-09 | End: 2023-05-16 | Stop reason: SDUPTHER

## 2022-06-03 RX ORDER — BUPROPION HYDROCHLORIDE 300 MG/1
TABLET ORAL
Qty: 30 TABLET | Refills: 0 | Status: SHIPPED | OUTPATIENT
Start: 2022-06-03 | End: 2022-06-03 | Stop reason: SDUPTHER

## 2022-06-03 NOTE — TELEPHONE ENCOUNTER
----- Message from ANABELLE Abernathy sent at 6/3/2022 11:28 AM CDT -----    ----- Message -----  From: Astrid Christy  Sent: 6/3/2022  11:08 AM CDT  To: ANABELLE Abernathy    Bupropion xl 300mg ([facility] )

## 2022-06-06 RX ORDER — BUPROPION HYDROCHLORIDE 300 MG/1
300 TABLET ORAL DAILY
Qty: 30 TABLET | Refills: 11 | Status: SHIPPED | OUTPATIENT
Start: 2022-06-06 | End: 2023-06-19

## 2022-07-07 ENCOUNTER — TELEPHONE (OUTPATIENT)
Dept: INTERNAL MEDICINE | Facility: CLINIC | Age: 57
End: 2022-07-07
Payer: COMMERCIAL

## 2022-07-07 RX ORDER — DAPAGLIFLOZIN 5 MG/1
5 TABLET, FILM COATED ORAL DAILY
Qty: 90 TABLET | Refills: 4 | Status: SHIPPED | OUTPATIENT
Start: 2022-07-07 | End: 2022-09-12

## 2022-07-07 NOTE — TELEPHONE ENCOUNTER
----- Message from David Pool MD sent at 7/7/2022  8:37 AM CDT -----  Regarding: RE: medical advice  I have no way of knowning what his medical plan covers  I sent in Farxiga 5mg, lets see what the coverage is like  Have him keep me posted  We will need a BMP and a A1c in 3 months      ----- Message -----  From: Milind Stallings MA  Sent: 7/6/2022   4:33 PM CDT  To: David Pool MD  Subject: FW: medical advice                               Have you seen a PA for this? Would you like to start one or send in another medication?  ----- Message -----  From: Kike Matthews  Sent: 7/6/2022   9:32 AM CDT  To: Devan Hernadnez Staff  Subject: medical advice                                       Who Called: pt   Who Left Message for Patient: nurse  Does the patient know what this is regarding?:   Would the patient rather a call back or a response via [facility]? yes  Best Call Back Number: [phone]  Additional Information: pt said his insurance does not cover the Glyxambi .. would like to know if he can have something else that he does not have to pay out of pocket

## 2022-07-07 NOTE — TELEPHONE ENCOUNTER
Spoke to pt. Pt Verbally confirmed understanding.  Pt stated that he will keep us updated.   Pt has an appointment on 9/12/22 and will get labs done prior to appointment.

## 2022-09-06 ENCOUNTER — TELEPHONE (OUTPATIENT)
Dept: ADMINISTRATIVE | Facility: HOSPITAL | Age: 57
End: 2022-09-06
Payer: COMMERCIAL

## 2022-09-06 DIAGNOSIS — E11.9 TYPE 2 DIABETES MELLITUS WITHOUT COMPLICATION, UNSPECIFIED WHETHER LONG TERM INSULIN USE: Primary | ICD-10-CM

## 2022-09-06 NOTE — TELEPHONE ENCOUNTER
----- Message from Milind Stallings MA sent at 9/6/2022  9:54 AM CDT -----  Regarding: PV 9/12/22 @ 9:00 Dr. Herman  1. Are there any outstanding tasks in the patient's chart? Yes, fasting labs    2. Is there any documentation in the chart? No    3.Has patient been seen in an ER, Urgent care clinic, or been admitted since last visit?  If yes, When, where, and why    4. Has patient seen any other healthcare providers since last visit?  If yes, when, where, and why    5. Has patient had any bloodwork or XR done since last visit?    6. Is patient signed up for patient portal?

## 2022-09-08 ENCOUNTER — LAB VISIT (OUTPATIENT)
Dept: LAB | Facility: HOSPITAL | Age: 57
End: 2022-09-08
Attending: INTERNAL MEDICINE
Payer: COMMERCIAL

## 2022-09-08 DIAGNOSIS — E11.9 TYPE 2 DIABETES MELLITUS WITHOUT COMPLICATION, UNSPECIFIED WHETHER LONG TERM INSULIN USE: ICD-10-CM

## 2022-09-08 LAB
ANION GAP SERPL CALC-SCNC: 9 MEQ/L
APPEARANCE UR: CLEAR
BACTERIA #/AREA URNS AUTO: NORMAL /HPF
BILIRUB UR QL STRIP.AUTO: NEGATIVE MG/DL
BUN SERPL-MCNC: 10.8 MG/DL (ref 8.4–25.7)
CALCIUM SERPL-MCNC: 10.4 MG/DL (ref 8.4–10.2)
CHLORIDE SERPL-SCNC: 102 MMOL/L (ref 98–107)
CHOLEST SERPL-MCNC: 178 MG/DL
CHOLEST/HDLC SERPL: 3 {RATIO} (ref 0–5)
CO2 SERPL-SCNC: 27 MMOL/L (ref 22–29)
COLOR UR AUTO: YELLOW
CREAT SERPL-MCNC: 1 MG/DL (ref 0.73–1.18)
CREAT UR-MCNC: 23.6 MG/DL (ref 63–166)
CREAT/UREA NIT SERPL: 11
EST. AVERAGE GLUCOSE BLD GHB EST-MCNC: 185.8 MG/DL
GFR SERPLBLD CREATININE-BSD FMLA CKD-EPI: >60 MLS/MIN/1.73/M2
GLUCOSE SERPL-MCNC: 204 MG/DL (ref 74–100)
GLUCOSE UR QL STRIP.AUTO: ABNORMAL MG/DL
HBA1C MFR BLD: 8.1 %
HDLC SERPL-MCNC: 53 MG/DL (ref 35–60)
KETONES UR QL STRIP.AUTO: NEGATIVE MG/DL
LDLC SERPL CALC-MCNC: 99 MG/DL (ref 50–140)
LEUKOCYTE ESTERASE UR QL STRIP.AUTO: NEGATIVE UNIT/L
MICROALBUMIN UR-MCNC: <5 UG/ML
MICROALBUMIN/CREAT RATIO PNL UR: <21.2 MG/GM CR (ref 0–30)
NITRITE UR QL STRIP.AUTO: NEGATIVE
PH UR STRIP.AUTO: 6 [PH]
POTASSIUM SERPL-SCNC: 4.6 MMOL/L (ref 3.5–5.1)
PROT UR QL STRIP.AUTO: NEGATIVE MG/DL
RBC #/AREA URNS AUTO: <5 /HPF
RBC UR QL AUTO: NEGATIVE UNIT/L
SODIUM SERPL-SCNC: 138 MMOL/L (ref 136–145)
SP GR UR STRIP.AUTO: 1.02 (ref 1–1.03)
SQUAMOUS #/AREA URNS AUTO: <5 /HPF
TRIGL SERPL-MCNC: 128 MG/DL (ref 34–140)
UROBILINOGEN UR STRIP-ACNC: 0.2 MG/DL
VLDLC SERPL CALC-MCNC: 26 MG/DL
WBC #/AREA URNS AUTO: <5 /HPF

## 2022-09-08 PROCEDURE — 80048 BASIC METABOLIC PNL TOTAL CA: CPT

## 2022-09-08 PROCEDURE — 81001 URINALYSIS AUTO W/SCOPE: CPT

## 2022-09-08 PROCEDURE — 82043 UR ALBUMIN QUANTITATIVE: CPT

## 2022-09-08 PROCEDURE — 83036 HEMOGLOBIN GLYCOSYLATED A1C: CPT

## 2022-09-08 PROCEDURE — 36415 COLL VENOUS BLD VENIPUNCTURE: CPT

## 2022-09-08 PROCEDURE — 80061 LIPID PANEL: CPT

## 2022-09-12 ENCOUNTER — OFFICE VISIT (OUTPATIENT)
Dept: INTERNAL MEDICINE | Facility: CLINIC | Age: 57
End: 2022-09-12
Payer: COMMERCIAL

## 2022-09-12 VITALS
WEIGHT: 218.19 LBS | SYSTOLIC BLOOD PRESSURE: 130 MMHG | HEART RATE: 90 BPM | BODY MASS INDEX: 29.55 KG/M2 | DIASTOLIC BLOOD PRESSURE: 86 MMHG | TEMPERATURE: 97 F | RESPIRATION RATE: 16 BRPM | HEIGHT: 72 IN | OXYGEN SATURATION: 97 %

## 2022-09-12 DIAGNOSIS — F32.A DEPRESSION, UNSPECIFIED DEPRESSION TYPE: ICD-10-CM

## 2022-09-12 DIAGNOSIS — E11.9 TYPE 2 DIABETES MELLITUS WITHOUT COMPLICATION, WITHOUT LONG-TERM CURRENT USE OF INSULIN: Primary | ICD-10-CM

## 2022-09-12 DIAGNOSIS — Z72.0 TOBACCO ABUSE: ICD-10-CM

## 2022-09-12 DIAGNOSIS — E78.5 HYPERLIPIDEMIA, UNSPECIFIED HYPERLIPIDEMIA TYPE: ICD-10-CM

## 2022-09-12 DIAGNOSIS — M06.00 SERONEGATIVE RHEUMATOID ARTHRITIS: ICD-10-CM

## 2022-09-12 DIAGNOSIS — Z00.00 ANNUAL PHYSICAL EXAM: ICD-10-CM

## 2022-09-12 DIAGNOSIS — I10 BENIGN ESSENTIAL HYPERTENSION: ICD-10-CM

## 2022-09-12 PROBLEM — E66.9 OBESITY: Status: ACTIVE | Noted: 2022-09-12

## 2022-09-12 PROCEDURE — 3075F SYST BP GE 130 - 139MM HG: CPT | Mod: CPTII,,, | Performed by: INTERNAL MEDICINE

## 2022-09-12 PROCEDURE — 99214 OFFICE O/P EST MOD 30 MIN: CPT | Mod: ,,, | Performed by: INTERNAL MEDICINE

## 2022-09-12 PROCEDURE — 3066F NEPHROPATHY DOC TX: CPT | Mod: CPTII,,, | Performed by: INTERNAL MEDICINE

## 2022-09-12 PROCEDURE — 99214 PR OFFICE/OUTPT VISIT, EST, LEVL IV, 30-39 MIN: ICD-10-PCS | Mod: ,,, | Performed by: INTERNAL MEDICINE

## 2022-09-12 PROCEDURE — 3079F PR MOST RECENT DIASTOLIC BLOOD PRESSURE 80-89 MM HG: ICD-10-PCS | Mod: CPTII,,, | Performed by: INTERNAL MEDICINE

## 2022-09-12 PROCEDURE — 3079F DIAST BP 80-89 MM HG: CPT | Mod: CPTII,,, | Performed by: INTERNAL MEDICINE

## 2022-09-12 PROCEDURE — 1160F RVW MEDS BY RX/DR IN RCRD: CPT | Mod: CPTII,,, | Performed by: INTERNAL MEDICINE

## 2022-09-12 PROCEDURE — 3008F PR BODY MASS INDEX (BMI) DOCUMENTED: ICD-10-PCS | Mod: CPTII,,, | Performed by: INTERNAL MEDICINE

## 2022-09-12 PROCEDURE — 3066F PR DOCUMENTATION OF TREATMENT FOR NEPHROPATHY: ICD-10-PCS | Mod: CPTII,,, | Performed by: INTERNAL MEDICINE

## 2022-09-12 PROCEDURE — 3075F PR MOST RECENT SYSTOLIC BLOOD PRESS GE 130-139MM HG: ICD-10-PCS | Mod: CPTII,,, | Performed by: INTERNAL MEDICINE

## 2022-09-12 PROCEDURE — 1159F PR MEDICATION LIST DOCUMENTED IN MEDICAL RECORD: ICD-10-PCS | Mod: CPTII,,, | Performed by: INTERNAL MEDICINE

## 2022-09-12 PROCEDURE — 3061F NEG MICROALBUMINURIA REV: CPT | Mod: CPTII,,, | Performed by: INTERNAL MEDICINE

## 2022-09-12 PROCEDURE — 1160F PR REVIEW ALL MEDS BY PRESCRIBER/CLIN PHARMACIST DOCUMENTED: ICD-10-PCS | Mod: CPTII,,, | Performed by: INTERNAL MEDICINE

## 2022-09-12 PROCEDURE — 3061F PR NEG MICROALBUMINURIA RESULT DOCUMENTED/REVIEW: ICD-10-PCS | Mod: CPTII,,, | Performed by: INTERNAL MEDICINE

## 2022-09-12 PROCEDURE — 3008F BODY MASS INDEX DOCD: CPT | Mod: CPTII,,, | Performed by: INTERNAL MEDICINE

## 2022-09-12 PROCEDURE — 1159F MED LIST DOCD IN RCRD: CPT | Mod: CPTII,,, | Performed by: INTERNAL MEDICINE

## 2022-09-12 RX ORDER — EMPAGLIFLOZIN AND LINAGLIPTIN 10; 5 MG/1; MG/1
1 TABLET, FILM COATED ORAL EVERY MORNING
COMMUNITY
Start: 2022-06-03 | End: 2022-09-12

## 2022-09-12 RX ORDER — ATORVASTATIN CALCIUM 20 MG/1
20 TABLET, FILM COATED ORAL DAILY
COMMUNITY
Start: 2022-08-06 | End: 2023-05-16 | Stop reason: SDUPTHER

## 2022-09-12 RX ORDER — PAROXETINE HYDROCHLORIDE 40 MG/1
40 TABLET, FILM COATED ORAL DAILY
COMMUNITY
Start: 2022-08-06 | End: 2022-09-26 | Stop reason: SDUPTHER

## 2022-09-12 RX ORDER — LEFLUNOMIDE 20 MG/1
20 TABLET ORAL DAILY
COMMUNITY
Start: 2022-07-26 | End: 2024-03-05

## 2022-09-12 RX ORDER — METFORMIN HYDROCHLORIDE 1000 MG/1
1000 TABLET ORAL 2 TIMES DAILY
COMMUNITY
Start: 2022-08-06 | End: 2023-03-20

## 2022-09-12 RX ORDER — PANTOPRAZOLE SODIUM 40 MG/1
40 TABLET, DELAYED RELEASE ORAL DAILY
COMMUNITY
Start: 2022-04-11 | End: 2023-03-20

## 2022-09-12 RX ORDER — DAPAGLIFLOZIN 5 MG/1
10 TABLET, FILM COATED ORAL DAILY
Qty: 180 TABLET | Refills: 4 | Status: SHIPPED | OUTPATIENT
Start: 2022-09-12 | End: 2023-01-09

## 2022-09-12 RX ORDER — TRAZODONE HYDROCHLORIDE 50 MG/1
100 TABLET ORAL NIGHTLY
COMMUNITY
Start: 2022-08-06 | End: 2023-03-20

## 2022-09-12 RX ORDER — AMLODIPINE BESYLATE 10 MG/1
10 TABLET ORAL DAILY
COMMUNITY
Start: 2022-08-06 | End: 2023-03-31

## 2022-09-12 NOTE — PROGRESS NOTES
Subjective:      Patient ID: Walter Crawford is a 56 y.o. male.    Chief Complaint: Diabetes (6 month F/U)      HPI:  56-year-old  male presents to clinic today for 6 month revisit  He also drinks at least 2 drinks a night beer or whiskey, he is a smoker  He has a sister who is diabetic as well as his mother.   Dr. Skip Ocampo annual prostate exams and PSA checks  History of genital herpes  Cardiology- Dr. Ron Gordon  Diabetic eye exam which he gets every February with Dr. Lyndsay Willard for Derm  Dr. Helton- Rheumatoid Arthritis  Dr. Reeder- C-scope in 5/2021--repeat in 5 years.  Dr. Deniz Platt- right L3-L5 TLIF on 4/9/21  Low dose CT scan of lungs 2/21--repeat in one year  UTD with COVID vaccines.  Labs reviewed with patient, A1c at 8.1, fasting sugar 204.  Patient reports polyuria, polydipsia and polyphagia.      Problem List Items Addressed This Visit          Psychiatric    Depression    Current Assessment & Plan     He would like to wean off some medication he is on Wellbutrin 300 and Paxil 40.  We discussed cutting the Paxil in half to 20 and doing that every day for a week and then every other day for a week then every 3rd day for a week.  Will see how he does with that.  He truly needs a therapist, referral placed         Relevant Orders    Ambulatory referral/consult to Psychology       Cardiac/Vascular    Benign essential hypertension    Hyperlipemia    Current Assessment & Plan     Not at goal current LDL at 99 with goal of 70 on Lipitor 20.  Dietary modifications discussed today            Immunology/Multi System    Seronegative rheumatoid arthritis    Overview     Dr Garcia            Endocrine    Type 2 diabetes mellitus without complication, without long-term current use of insulin - Primary    Relevant Medications    metFORMIN (GLUCOPHAGE) 1000 MG tablet    Other Relevant Orders    Ambulatory referral/consult to Cardiology    Basic Metabolic Panel       Other    Annual physical  exam     Other Visit Diagnoses       Tobacco abuse        Relevant Orders    CT Chest Lung Screening Low Dose                Past Medical History:  Past Medical History:   Diagnosis Date    ADHD (attention deficit hyperactivity disorder)     Diabetes mellitus, type 2     Hypertension     Microalbuminuria     Musculoskeletal pain     Seronegative rheumatoid arthritis     Vitamin B12 deficiency      Past Surgical History:   Procedure Laterality Date    CHOLECYSTECTOMY      EYE SURGERY       Review of patient's allergies indicates:  No Known Allergies  Current Outpatient Medications on File Prior to Visit   Medication Sig Dispense Refill    amLODIPine (NORVASC) 10 MG tablet Take 10 mg by mouth once daily.      atorvastatin (LIPITOR) 20 MG tablet Take 20 mg by mouth once daily.      buPROPion (WELLBUTRIN XL) 300 MG 24 hr tablet Take 1 tablet (300 mg total) by mouth once daily. 30 tablet 11    leflunomide (ARAVA) 20 MG Tab Take 20 mg by mouth once daily.      metFORMIN (GLUCOPHAGE) 1000 MG tablet Take 1,000 mg by mouth 2 (two) times daily.      olmesartan-hydrochlorothiazide (BENICAR HCT) 20-12.5 mg per tablet Take 1 tablet by mouth once daily. 90 tablet 3    paroxetine (PAXIL) 40 MG tablet Take 40 mg by mouth once daily.      traZODone (DESYREL) 50 MG tablet Take 100 mg by mouth every evening.      [DISCONTINUED] dapagliflozin (FARXIGA) 5 mg Tab tablet Take 1 tablet (5 mg total) by mouth once daily. 90 tablet 4    pantoprazole (PROTONIX) 40 MG tablet Take 40 mg by mouth once daily.      [DISCONTINUED] GLYXAMBI 10-5 mg Tab Take 1 tablet by mouth every morning.       No current facility-administered medications on file prior to visit.     Social History     Socioeconomic History    Marital status: Unknown   Tobacco Use    Smoking status: Every Day     Packs/day: 0.50     Types: Cigarettes    Smokeless tobacco: Never   Substance and Sexual Activity    Alcohol use: Yes     Comment: Nightly    Drug use: Never    Sexual  activity: Yes     Partners: Female     Family History   Problem Relation Age of Onset    Diabetes Mother     Hypertension Father     Dementia Father     Diabetes Sister     Depression Sister            Review of Systems  Constitutional: No fever,  no fatigue, no chills, no night sweats, no weight gain, no weight loss, no changes in appetite.   Eye: No redness, no acute vision loss, no blurred vision, no double vision, no eye pain  ENMT: No sore throat, no nasal drainage, no nose bleeds,  no headache, no ear pain, no ear drainage, no acute hearing loss  Respiratory: No cough, no sputum production, no shortness of breath, no hemoptysis, no wheezing.  Cardiovascular: No chest pain, no chest tightness, no SHELLEY, no PND, no orthopnea, no swelling, no palpitations.  Gastrointestinal: No abdominal pain, no nausea, no vomiting, no diarrhea, no constipation, no difficulty swallowing, no change in bowel habits, no rectal bleeding  Genitourinary: no urgency, no frequency, no burning or pain when urinating, no blood in urine, no incontinence  Heme/Lymph: No easy bruising and/or bleeding, no swollen or painful glands.  Endocrine: + polyuria, +polydipsia, + polyphagia, no heat or cold intolerance.  Musculoskeletal: No muscle pain, no muscle weakness, no joint pain, no red or swollen joints.  Integumentary: No rash, no pruritis, no hair or nail changes.  Neurologic: No dizziness, no fainting, no tremors, no tingling and/ or numbness.  Psychiatric: No anxiety, no depression, no memory loss  All Other ROS: Negative with exception of what is documented in the history of present illness     Objective:   /86 (BP Location: Left arm, Patient Position: Sitting, BP Method: Medium (Manual))   Pulse 90   Temp 97.4 °F (36.3 °C) (Temporal)   Resp 16   Ht 6' (1.829 m)   Wt 99 kg (218 lb 3.2 oz)   SpO2 97%   BMI 29.59 kg/m²     Physical Exam  General : Alert and oriented, No acute distress, well, developed, well nourished, afebrile    Eye : PERRLA. EOMI.   HEENT : Normocephalic. Neck supple.   Respiratory : Lungs are clear to auscultation bilaterally, non-labored. Symmetrical chest wall expansion. No crackles, wheeze, or rhonci.  Cardiovascular : Normal rate, Regular rhythm. No murmurs, rubs, or gallops. Pulses 2+ in all extremities. No Edema.  Gastrointestinal : Soft, nontender, non-distended, bowel sounds normal, no organomegaly, no guarding, no rebound.  Musculoskeletal : Normal ROM.  No muscle tenderness.  Integumentary : Warm to touch. Intact. No rash.    Neurologic : Alert and oriented. No focal deficits.   Psychiatric : Cooperative, Appropriate mood & affect. Normal judgment.          Assessment:     1. Type 2 diabetes mellitus without complication, without long-term current use of insulin    2. Annual physical exam    3. Depression, unspecified depression type    4. Tobacco abuse    5. Hyperlipidemia, unspecified hyperlipidemia type    6. Benign essential hypertension    7. Seronegative rheumatoid arthritis                  Plan:       I have discontinued Walter LARRY Alonsoley's dapagliflozin and GLYXAMBI. I am also having him start on dapagliflozin. Additionally, I am having him maintain his olmesartan-hydrochlorothiazide, buPROPion, amLODIPine, atorvastatin, leflunomide, metFORMIN, pantoprazole, paroxetine, and traZODone.      Problem List Items Addressed This Visit          Psychiatric    Depression     He would like to wean off some medication he is on Wellbutrin 300 and Paxil 40.  We discussed cutting the Paxil in half to 20 and doing that every day for a week and then every other day for a week then every 3rd day for a week.  Will see how he does with that.  He truly needs a therapist, referral placed         Relevant Orders    Ambulatory referral/consult to Psychology       Cardiac/Vascular    Benign essential hypertension    Hyperlipemia     Not at goal current LDL at 99 with goal of 70 on Lipitor 20.  Dietary modifications discussed  today            Immunology/Multi System    Seronegative rheumatoid arthritis       Endocrine    Type 2 diabetes mellitus without complication, without long-term current use of insulin - Primary    Relevant Medications    metFORMIN (GLUCOPHAGE) 1000 MG tablet    Other Relevant Orders    Ambulatory referral/consult to Cardiology    Basic Metabolic Panel       Other    Annual physical exam     Other Visit Diagnoses       Tobacco abuse        Relevant Orders    CT Chest Lung Screening Low Dose              Walter was seen today for diabetes.    Diagnoses and all orders for this visit:    Type 2 diabetes mellitus without complication, without long-term current use of insulin  -     Ambulatory referral/consult to Cardiology; Future  -     Basic Metabolic Panel; Future    Annual physical exam    Depression, unspecified depression type  -     Ambulatory referral/consult to Psychology; Future    Tobacco abuse  -     CT Chest Lung Screening Low Dose; Future    Hyperlipidemia, unspecified hyperlipidemia type    Benign essential hypertension    Seronegative rheumatoid arthritis    Other orders  -     dapagliflozin (FARXIGA) 5 mg Tab tablet; Take 2 tablets (10 mg total) by mouth once daily.          Medications Ordered This Encounter   Medications    dapagliflozin (FARXIGA) 5 mg Tab tablet     Sig: Take 2 tablets (10 mg total) by mouth once daily.     Dispense:  180 tablet     Refill:  4     [unfilled]  Orders Placed This Encounter   Procedures    CT Chest Lung Screening Low Dose     Standing Status:   Future     Standing Expiration Date:   9/12/2023     Order Specific Question:   Is there documentation of shared decision making for this lung screening exam?     Answer:   Yes     Order Specific Question:   Is the patient a current smoker?     Answer:   Yes     Order Specific Question:   Is the patient a current or former smoker who quit within the past 15 years?     Answer:   Yes     Order Specific Question:   Is the patient  between the age 50-80 years old?     Answer:   Yes     Order Specific Question:   Has the patient smoked 20 or more packs of cigarettes/year?     Answer:   Yes     Order Specific Question:   Does the patient show any signs or symptoms of lung cancer?     Answer:   No     Order Specific Question:   Is this the first (baseline) CT or an annual exam?     Answer:   Annual [2]     Order Specific Question:   May the Radiologist modify the order per protocol to meet the clinical needs of the patient?     Answer:   Yes     Order Specific Question:   Is this a low dose screening chest CT?     Answer:   Yes     Order Specific Question:   Is this a low dose screening chest CT?     Answer:   Yes    Basic Metabolic Panel     Standing Status:   Future     Standing Expiration Date:   11/11/2023    Ambulatory referral/consult to Psychology     Standing Status:   Future     Standing Expiration Date:   10/12/2023     Referral Priority:   Routine     Referral Type:   Psychiatric     Referral Reason:   Specialty Services Required     Referred to Provider:   Fely Gonsales, PhD     Requested Specialty:   Psychology     Number of Visits Requested:   1    Ambulatory referral/consult to Cardiology     Standing Status:   Future     Standing Expiration Date:   10/12/2023     Referral Priority:   Routine     Referral Type:   Consultation     Referral Reason:   Specialty Services Required     Referred to Provider:   Sari Lopez MD     Requested Specialty:   Cardiology     Number of Visits Requested:   1       Medication List with Changes/Refills   New Medications    DAPAGLIFLOZIN (FARXIGA) 5 MG TAB TABLET    Take 2 tablets (10 mg total) by mouth once daily.   Current Medications    AMLODIPINE (NORVASC) 10 MG TABLET    Take 10 mg by mouth once daily.    ATORVASTATIN (LIPITOR) 20 MG TABLET    Take 20 mg by mouth once daily.    BUPROPION (WELLBUTRIN XL) 300 MG 24 HR TABLET    Take 1 tablet (300 mg total) by mouth once daily.    LEFLUNOMIDE  (ARAVA) 20 MG TAB    Take 20 mg by mouth once daily.    METFORMIN (GLUCOPHAGE) 1000 MG TABLET    Take 1,000 mg by mouth 2 (two) times daily.    OLMESARTAN-HYDROCHLOROTHIAZIDE (BENICAR HCT) 20-12.5 MG PER TABLET    Take 1 tablet by mouth once daily.    PANTOPRAZOLE (PROTONIX) 40 MG TABLET    Take 40 mg by mouth once daily.    PAROXETINE (PAXIL) 40 MG TABLET    Take 40 mg by mouth once daily.    TRAZODONE (DESYREL) 50 MG TABLET    Take 100 mg by mouth every evening.   Discontinued Medications    DAPAGLIFLOZIN (FARXIGA) 5 MG TAB TABLET    Take 1 tablet (5 mg total) by mouth once daily.    GLYXAMBI 10-5 MG TAB    Take 1 tablet by mouth every morning.      Medication List with Changes/Refills   New Medications    DAPAGLIFLOZIN (FARXIGA) 5 MG TAB TABLET    Take 2 tablets (10 mg total) by mouth once daily.       Start Date: 9/12/2022 End Date: --   Current Medications    AMLODIPINE (NORVASC) 10 MG TABLET    Take 10 mg by mouth once daily.       Start Date: 8/6/2022  End Date: --    ATORVASTATIN (LIPITOR) 20 MG TABLET    Take 20 mg by mouth once daily.       Start Date: 8/6/2022  End Date: --    BUPROPION (WELLBUTRIN XL) 300 MG 24 HR TABLET    Take 1 tablet (300 mg total) by mouth once daily.       Start Date: 6/6/2022  End Date: --    LEFLUNOMIDE (ARAVA) 20 MG TAB    Take 20 mg by mouth once daily.       Start Date: 7/26/2022 End Date: --    METFORMIN (GLUCOPHAGE) 1000 MG TABLET    Take 1,000 mg by mouth 2 (two) times daily.       Start Date: 8/6/2022  End Date: --    OLMESARTAN-HYDROCHLOROTHIAZIDE (BENICAR HCT) 20-12.5 MG PER TABLET    Take 1 tablet by mouth once daily.       Start Date: 5/9/2022  End Date: 5/9/2023    PANTOPRAZOLE (PROTONIX) 40 MG TABLET    Take 40 mg by mouth once daily.       Start Date: 4/11/2022 End Date: --    PAROXETINE (PAXIL) 40 MG TABLET    Take 40 mg by mouth once daily.       Start Date: 8/6/2022  End Date: --    TRAZODONE (DESYREL) 50 MG TABLET    Take 100 mg by mouth every evening.        Start Date: 8/6/2022  End Date: --   Discontinued Medications    DAPAGLIFLOZIN (FARXIGA) 5 MG TAB TABLET    Take 1 tablet (5 mg total) by mouth once daily.       Start Date: 7/7/2022  End Date: 9/12/2022    GLYXAMBI 10-5 MG TAB    Take 1 tablet by mouth every morning.       Start Date: 6/3/2022  End Date: 9/12/2022            Follow up in about 3 months (around 12/12/2022).

## 2022-09-12 NOTE — ASSESSMENT & PLAN NOTE
He would like to wean off some medication he is on Wellbutrin 300 and Paxil 40.  We discussed cutting the Paxil in half to 20 and doing that every day for a week and then every other day for a week then every 3rd day for a week.  Will see how he does with that.  He truly needs a therapist, referral placed

## 2022-09-12 NOTE — ASSESSMENT & PLAN NOTE
Not at goal current LDL at 99 with goal of 70 on Lipitor 20.  Dietary modifications discussed today

## 2022-09-22 ENCOUNTER — HISTORICAL (OUTPATIENT)
Dept: ADMINISTRATIVE | Facility: HOSPITAL | Age: 57
End: 2022-09-22
Payer: COMMERCIAL

## 2022-11-22 DIAGNOSIS — F32.A DEPRESSION, UNSPECIFIED DEPRESSION TYPE: Primary | ICD-10-CM

## 2022-11-22 RX ORDER — PAROXETINE HYDROCHLORIDE 40 MG/1
TABLET, FILM COATED ORAL
Qty: 30 TABLET | Refills: 0 | Status: SHIPPED | OUTPATIENT
Start: 2022-11-22 | End: 2023-01-03

## 2022-12-07 ENCOUNTER — DOCUMENTATION ONLY (OUTPATIENT)
Dept: INTERNAL MEDICINE | Facility: CLINIC | Age: 57
End: 2022-12-07
Payer: COMMERCIAL

## 2022-12-12 ENCOUNTER — OFFICE VISIT (OUTPATIENT)
Dept: INTERNAL MEDICINE | Facility: CLINIC | Age: 57
End: 2022-12-12
Payer: COMMERCIAL

## 2022-12-12 VITALS
SYSTOLIC BLOOD PRESSURE: 132 MMHG | HEART RATE: 80 BPM | WEIGHT: 220 LBS | DIASTOLIC BLOOD PRESSURE: 84 MMHG | BODY MASS INDEX: 30.8 KG/M2 | OXYGEN SATURATION: 97 % | HEIGHT: 71 IN

## 2022-12-12 DIAGNOSIS — R10.9 ABDOMINAL PAIN, UNSPECIFIED ABDOMINAL LOCATION: ICD-10-CM

## 2022-12-12 DIAGNOSIS — I10 BENIGN ESSENTIAL HYPERTENSION: ICD-10-CM

## 2022-12-12 DIAGNOSIS — E11.9 TYPE 2 DIABETES MELLITUS WITHOUT COMPLICATION, WITHOUT LONG-TERM CURRENT USE OF INSULIN: Primary | ICD-10-CM

## 2022-12-12 PROBLEM — Z86.0100 PERSONAL HISTORY OF COLONIC POLYPS: Status: ACTIVE | Noted: 2021-05-18

## 2022-12-12 PROBLEM — Z86.010 PERSONAL HISTORY OF COLONIC POLYPS: Status: ACTIVE | Noted: 2021-05-18

## 2022-12-12 PROBLEM — Z00.00 ANNUAL PHYSICAL EXAM: Status: RESOLVED | Noted: 2022-09-12 | Resolved: 2022-12-12

## 2022-12-12 PROCEDURE — 1159F PR MEDICATION LIST DOCUMENTED IN MEDICAL RECORD: ICD-10-PCS | Mod: CPTII,,, | Performed by: NURSE PRACTITIONER

## 2022-12-12 PROCEDURE — 3075F PR MOST RECENT SYSTOLIC BLOOD PRESS GE 130-139MM HG: ICD-10-PCS | Mod: CPTII,,, | Performed by: NURSE PRACTITIONER

## 2022-12-12 PROCEDURE — 1160F PR REVIEW ALL MEDS BY PRESCRIBER/CLIN PHARMACIST DOCUMENTED: ICD-10-PCS | Mod: CPTII,,, | Performed by: NURSE PRACTITIONER

## 2022-12-12 PROCEDURE — 3008F BODY MASS INDEX DOCD: CPT | Mod: CPTII,,, | Performed by: NURSE PRACTITIONER

## 2022-12-12 PROCEDURE — 3079F DIAST BP 80-89 MM HG: CPT | Mod: CPTII,,, | Performed by: NURSE PRACTITIONER

## 2022-12-12 PROCEDURE — 99214 PR OFFICE/OUTPT VISIT, EST, LEVL IV, 30-39 MIN: ICD-10-PCS | Mod: ,,, | Performed by: NURSE PRACTITIONER

## 2022-12-12 PROCEDURE — 3061F NEG MICROALBUMINURIA REV: CPT | Mod: CPTII,,, | Performed by: NURSE PRACTITIONER

## 2022-12-12 PROCEDURE — 3075F SYST BP GE 130 - 139MM HG: CPT | Mod: CPTII,,, | Performed by: NURSE PRACTITIONER

## 2022-12-12 PROCEDURE — 1159F MED LIST DOCD IN RCRD: CPT | Mod: CPTII,,, | Performed by: NURSE PRACTITIONER

## 2022-12-12 PROCEDURE — 3066F PR DOCUMENTATION OF TREATMENT FOR NEPHROPATHY: ICD-10-PCS | Mod: CPTII,,, | Performed by: NURSE PRACTITIONER

## 2022-12-12 PROCEDURE — 1160F RVW MEDS BY RX/DR IN RCRD: CPT | Mod: CPTII,,, | Performed by: NURSE PRACTITIONER

## 2022-12-12 PROCEDURE — 3061F PR NEG MICROALBUMINURIA RESULT DOCUMENTED/REVIEW: ICD-10-PCS | Mod: CPTII,,, | Performed by: NURSE PRACTITIONER

## 2022-12-12 PROCEDURE — 99214 OFFICE O/P EST MOD 30 MIN: CPT | Mod: ,,, | Performed by: NURSE PRACTITIONER

## 2022-12-12 PROCEDURE — 3079F PR MOST RECENT DIASTOLIC BLOOD PRESSURE 80-89 MM HG: ICD-10-PCS | Mod: CPTII,,, | Performed by: NURSE PRACTITIONER

## 2022-12-12 PROCEDURE — 3066F NEPHROPATHY DOC TX: CPT | Mod: CPTII,,, | Performed by: NURSE PRACTITIONER

## 2022-12-12 PROCEDURE — 3008F PR BODY MASS INDEX (BMI) DOCUMENTED: ICD-10-PCS | Mod: CPTII,,, | Performed by: NURSE PRACTITIONER

## 2022-12-12 RX ORDER — TAMSULOSIN HYDROCHLORIDE 0.4 MG/1
1 CAPSULE ORAL NIGHTLY
COMMUNITY
Start: 2022-11-07 | End: 2023-03-20

## 2022-12-12 NOTE — ASSESSMENT & PLAN NOTE
Well controlled.   Continue Olmesartan 20mg / HCTZ 12.5mg daily   Low Sodium Diet (DASH Diet - Less than 2 grams of sodium per day).  Monitor blood pressure daily and log. Report consistent numbers greater than 140/90.  Maintain healthy weight with goal BMI <30. Exercise 30 minutes per day, 5 days per week.

## 2022-12-12 NOTE — PROGRESS NOTES
Patient ID: [id number]     Chief Complaint: Abdominal Pain (BS)        HPI:     Walter Crawford is a 56 y.o. male here today for a follow up. Complaining of CBGs in the 200-250s. Occasionally monitors at home. Admits to poor diet and decreased exercise. Having some mild mid epigastric/RUQ abdominal pain for the past several months. No exacerbating or relieving factors. Denies nausea, vomiting, diarrhea, hematochezia/hematemesis. He is only taking farxiga 5mg daily as his insurance is not covering 10mg. No other complaints today.       ----------------------------  ADHD (attention deficit hyperactivity disorder)  Benign essential hypertension  Depression  Hyperlipemia  Microalbuminuria  Personal history of colonic polyps  Seronegative rheumatoid arthritis  Type 2 diabetes mellitus without complication, without long-term   current use of insulin  Vitamin B12 deficiency     Past Surgical History:   Procedure Laterality Date    CHOLECYSTECTOMY      COLONOSCOPY W/ POLYPECTOMY  05/18/2021    EYE SURGERY         Review of patient's allergies indicates:  No Known Allergies    Outpatient Medications Marked as Taking for the 12/12/22 encounter (Office Visit) with ANABELLE Nash   Medication Sig Dispense Refill    amLODIPine (NORVASC) 10 MG tablet Take 10 mg by mouth once daily.      atorvastatin (LIPITOR) 20 MG tablet Take 20 mg by mouth once daily.      buPROPion (WELLBUTRIN XL) 300 MG 24 hr tablet Take 1 tablet (300 mg total) by mouth once daily. 30 tablet 11    dapagliflozin (FARXIGA) 5 mg Tab tablet Take 2 tablets (10 mg total) by mouth once daily. 180 tablet 4    leflunomide (ARAVA) 20 MG Tab Take 20 mg by mouth once daily.      metFORMIN (GLUCOPHAGE) 1000 MG tablet Take 1,000 mg by mouth 2 (two) times daily.      olmesartan-hydrochlorothiazide (BENICAR HCT) 20-12.5 mg per tablet Take 1 tablet by mouth once daily. 90 tablet 3    paroxetine (PAXIL) 40 MG tablet Take 1 tablet by mouth once daily 30 tablet 0     traZODone (DESYREL) 50 MG tablet Take 100 mg by mouth every evening. 2 tablets.         Social History     Socioeconomic History    Marital status: Unknown   Tobacco Use    Smoking status: Every Day     Packs/day: 0.50     Types: Cigarettes    Smokeless tobacco: Never   Substance and Sexual Activity    Alcohol use: Yes     Comment: Nightly    Drug use: Never    Sexual activity: Yes     Partners: Female        Family History   Problem Relation Age of Onset    Diabetes Mother     Hypertension Father     Dementia Father     Diabetes Sister     Depression Sister         Patient Care Team:  David Pool MD as PCP - General (Internal Medicine)  Tavia Helton MD as Consulting Physician (Rheumatology)     Subjective:     ROS    See HPI for details    Constitutional: Denies Change in appetite. Denies Chills. Denies Fever. Denies Night sweats.  Eye: Denies Blurred vision. Denies Discharge. Denies Eye pain.  ENT: Denies Decreased hearing. Denies Sore throat. Denies Swollen glands.  Respiratory: Denies Cough. Denies Shortness of breath. Denies Shortness of breath with exertion. Denies Wheezing.  Cardiovascular: DeniesChest pain at rest. Denies Chest pain with exertion. Denies Irregular heartbeat. Denies Palpitations. Denies Edema.  Gastrointestinal: Admits Abdominal pain. DeniesDiarrhea. Denies Nausea. Denies Vomiting. Denies Hematemesis or Hematochezia.  Genitourinary: Denies Dysuria. Denies Urinary frequency. Denies Urinary urgency. Denies Blood in urine.  Endocrine: Denies Cold intolerance. Denies Excessive thirst. Denies Heat intolerance. Denies Weight loss. Denies Weight gain.  Musculoskeletal: Denies Painful joints. Denies Weakness.  Integumentary: Denies Rash. Denies Itching. Denies Dry skin.  Neurologic: Denies Dizziness. Denies Fainting. Denies Headache.  Psychiatric: Denies Depression. Denies Anxiety. Denies Suicidal/Homicidal ideations.    All Other ROS: Negative except as stated in HPI.  "      Objective:     /84   Pulse 80   Ht 5' 11" (1.803 m)   Wt 99.8 kg (220 lb)   SpO2 97%   BMI 30.68 kg/m²     Physical Exam    General: Alert and oriented, No acute distress.  Head: Normocephalic, Atraumatic.  Eye: Pupils are equal, round and reactive to light, Extraocular movements are intact, Sclera non-icteric.  Ears/Nose/Throat: Normal, Mucosa moist,Clear.  Neck/Thyroid: Supple, Non-tender, No carotid bruit, No lymphadenopathy, No JVD, Full range of motion.  Respiratory: Clear to auscultation bilaterally; No wheezes, rales or rhonchi,Non-labored respirations, Symmetrical chest wall expansion.  Cardiovascular: Regular rate and rhythm, S1/S2 normal, No murmurs, rubs or gallops.  Gastrointestinal: Soft, RUQ tenderness, No rebound or guarding, Non-distended, Normal bowel sounds, No palpable organomegaly.  Musculoskeletal: Normal range of motion.  Integumentary: Warm, Dry, Intact, No suspicious lesions or rashes.  Extremities: No clubbing, cyanosis or edema  Neurologic: No focal deficits, Cranial Nerves II-XII are grossly intact, Motor strength normal upper and lower extremities, Sensory exam intact.  Psychiatric: Normal interaction, Coherent speech, Euthymic mood, Appropriate affect       Labs Reviewed:     Lab Results   Component Value Date    WBC 5.6 09/20/2021    HGB 14.9 09/20/2021    HCT 45.7 09/20/2021     09/20/2021    CHOL 178 09/08/2022    TRIG 128 09/08/2022    ALT 36 05/18/2021    AST 19 05/18/2021     09/08/2022    K 4.6 09/08/2022    CREATININE 1.00 09/08/2022    BUN 10.8 09/08/2022    CO2 27 09/08/2022    TSH 3.872 (H) 02/19/2019    PSA 0.63 02/19/2019    HGBA1C 8.1 (H) 09/08/2022       Assessment:       ICD-10-CM ICD-9-CM   1. Type 2 diabetes mellitus without complication, without long-term current use of insulin  E11.9 250.00   2. Benign essential hypertension  I10 401.1   3. Abdominal pain, unspecified abdominal location  R10.9 789.00        Plan:     1. Type 2 diabetes " mellitus without complication, without long-term current use of insulin  Assessment & Plan:  Lab Results   Component Value Date    HGBA1C 8.1 (H) 09/08/2022    HGBA1C 7.1 (H) 05/18/2021    HGBA1C 7.4 (H) 02/22/2021     Lab Results   Component Value Date    CREATININE 1.00 09/08/2022     Continue Farxiga 5mg daily + Metformin 1000mg BID + add Mounjaro 2.5mg weekly  Discussed caution with SGLT2s + diuretics as concomitant use can cause volume depletion. Discussed caution with DPP-Ivs and HF risk.  Follow ADA Diet. Avoid soda, simple sweets, and limit rice/pasta/breads/starches (no more than 45-50 grams per meal).  Maintain healthy weight with goal BMI <30.  Exercise 5 times per week for 30 minutes per day.  Stressed importance of daily foot exams.  Stressed importance of annual dilated eye exam.      Orders:  -     Lipid Panel; Future; Expected date: 12/12/2022  -     Comprehensive Metabolic Panel; Future; Expected date: 12/12/2022  -     Hemoglobin A1C; Future; Expected date: 12/12/2022    2. Benign essential hypertension  Assessment & Plan:  Well controlled.   Continue Olmesartan 20mg / HCTZ 12.5mg daily   Low Sodium Diet (DASH Diet - Less than 2 grams of sodium per day).  Monitor blood pressure daily and log. Report consistent numbers greater than 140/90.  Maintain healthy weight with goal BMI <30. Exercise 30 minutes per day, 5 days per week.          3. Abdominal pain, unspecified abdominal location  Assessment & Plan:  US Abdomen ordered.    Orders:  -     US Abdomen Complete; Future; Expected date: 12/12/2022           Medication List with Changes/Refills   Current Medications    AMLODIPINE (NORVASC) 10 MG TABLET    Take 10 mg by mouth once daily.       Start Date: 8/6/2022  End Date: --    ATORVASTATIN (LIPITOR) 20 MG TABLET    Take 20 mg by mouth once daily.       Start Date: 8/6/2022  End Date: --    BUPROPION (WELLBUTRIN XL) 300 MG 24 HR TABLET    Take 1 tablet (300 mg total) by mouth once daily.        Start Date: 6/6/2022  End Date: --    DAPAGLIFLOZIN (FARXIGA) 5 MG TAB TABLET    Take 2 tablets (10 mg total) by mouth once daily.       Start Date: 9/12/2022 End Date: --    LEFLUNOMIDE (ARAVA) 20 MG TAB    Take 20 mg by mouth once daily.       Start Date: 7/26/2022 End Date: --    METFORMIN (GLUCOPHAGE) 1000 MG TABLET    Take 1,000 mg by mouth 2 (two) times daily.       Start Date: 8/6/2022  End Date: --    OLMESARTAN-HYDROCHLOROTHIAZIDE (BENICAR HCT) 20-12.5 MG PER TABLET    Take 1 tablet by mouth once daily.       Start Date: 5/9/2022  End Date: 5/9/2023    PANTOPRAZOLE (PROTONIX) 40 MG TABLET    Take 40 mg by mouth once daily.       Start Date: 4/11/2022 End Date: --    PAROXETINE (PAXIL) 40 MG TABLET    Take 1 tablet by mouth once daily       Start Date: 11/22/2022End Date: --    TAMSULOSIN (FLOMAX) 0.4 MG CAP    Take 1 capsule by mouth every evening.       Start Date: 11/7/2022 End Date: --    TRAZODONE (DESYREL) 50 MG TABLET    Take 100 mg by mouth every evening. 2 tablets.       Start Date: 8/6/2022  End Date: --          Follow up in about 1 month (around 1/12/2023) for Routine Follow Up. In addition to their scheduled follow up, the patient has also been instructed to follow up on as needed basis.

## 2022-12-12 NOTE — ASSESSMENT & PLAN NOTE
Lab Results   Component Value Date    HGBA1C 8.1 (H) 09/08/2022    HGBA1C 7.1 (H) 05/18/2021    HGBA1C 7.4 (H) 02/22/2021     Lab Results   Component Value Date    CREATININE 1.00 09/08/2022     Continue Farxiga 5mg daily + Metformin 1000mg BID + add Mounjaro 2.5mg weekly  Discussed caution with SGLT2s + diuretics as concomitant use can cause volume depletion. Discussed caution with DPP-Ivs and HF risk.  Follow ADA Diet. Avoid soda, simple sweets, and limit rice/pasta/breads/starches (no more than 45-50 grams per meal).  Maintain healthy weight with goal BMI <30.  Exercise 5 times per week for 30 minutes per day.  Stressed importance of daily foot exams.  Stressed importance of annual dilated eye exam.

## 2022-12-15 ENCOUNTER — DOCUMENTATION ONLY (OUTPATIENT)
Dept: INTERNAL MEDICINE | Facility: CLINIC | Age: 57
End: 2022-12-15
Payer: COMMERCIAL

## 2022-12-20 ENCOUNTER — TELEPHONE (OUTPATIENT)
Dept: INTERNAL MEDICINE | Facility: CLINIC | Age: 57
End: 2022-12-20
Payer: COMMERCIAL

## 2022-12-20 NOTE — TELEPHONE ENCOUNTER
----- Message from ANABELLE Nash sent at 12/19/2022  8:47 AM CST -----  Please inform patient of US Abdomen results.    1. Fatty liver - no other abnormalities. Follow low fat/carb/starch/sugar diet. Recommend weight loss and exercise. Avoid alcohol intake + daily NSAIDs/Tylenol use.     All other labwork within acceptable limits.    Thanks for all you do,    David

## 2022-12-22 ENCOUNTER — TELEPHONE (OUTPATIENT)
Dept: INTERNAL MEDICINE | Facility: CLINIC | Age: 57
End: 2022-12-22
Payer: COMMERCIAL

## 2023-01-03 ENCOUNTER — TELEPHONE (OUTPATIENT)
Dept: INTERNAL MEDICINE | Facility: CLINIC | Age: 58
End: 2023-01-03
Payer: COMMERCIAL

## 2023-01-03 NOTE — TELEPHONE ENCOUNTER
----- Message from Astrid Christy MA sent at 1/3/2023  7:55 AM CST -----  Regarding: FW: sooner appt  Please reschedule patient   ----- Message -----  From: Desiree Joseph  Sent: 1/3/2023   7:28 AM CST  To: [facility] Staff  Subject: sooner appt                                      .Type:  Sooner Apoointment Request    Caller is requesting a sooner appointment.  Caller declined first available appointment listed below.  Caller will not accept being placed on the waitlist and is requesting a message be sent to doctor.  Name of Caller:pt  When is the first available appointment?3/20  Symptoms:1 mth f/u  Would the patient rather a call back or a response via [facility]? C/b  Best Call Back Number:[phone]  Additional Information: pt had appt for 1/9 but accidentally cancelled it not realizing it was with Dr Herman.  Pt called to get appt rescheduled but 1st avail is 3/20

## 2023-01-09 ENCOUNTER — OFFICE VISIT (OUTPATIENT)
Dept: INTERNAL MEDICINE | Facility: CLINIC | Age: 58
End: 2023-01-09
Payer: COMMERCIAL

## 2023-01-09 VITALS
HEIGHT: 71 IN | TEMPERATURE: 97 F | DIASTOLIC BLOOD PRESSURE: 84 MMHG | WEIGHT: 222 LBS | RESPIRATION RATE: 16 BRPM | HEART RATE: 90 BPM | OXYGEN SATURATION: 98 % | SYSTOLIC BLOOD PRESSURE: 130 MMHG | BODY MASS INDEX: 31.08 KG/M2

## 2023-01-09 DIAGNOSIS — E11.9 TYPE 2 DIABETES MELLITUS WITHOUT COMPLICATION, WITHOUT LONG-TERM CURRENT USE OF INSULIN: ICD-10-CM

## 2023-01-09 DIAGNOSIS — Z23 NEED FOR VACCINATION: Primary | ICD-10-CM

## 2023-01-09 PROCEDURE — 99214 PR OFFICE/OUTPT VISIT, EST, LEVL IV, 30-39 MIN: ICD-10-PCS | Mod: 25,,, | Performed by: INTERNAL MEDICINE

## 2023-01-09 PROCEDURE — 90686 FLU VACCINE (QUAD) GREATER THAN OR EQUAL TO 3YO PRESERVATIVE FREE IM: ICD-10-PCS | Mod: ,,, | Performed by: INTERNAL MEDICINE

## 2023-01-09 PROCEDURE — 3075F SYST BP GE 130 - 139MM HG: CPT | Mod: CPTII,,, | Performed by: INTERNAL MEDICINE

## 2023-01-09 PROCEDURE — 90471 IMMUNIZATION ADMIN: CPT | Mod: ,,, | Performed by: INTERNAL MEDICINE

## 2023-01-09 PROCEDURE — 3079F PR MOST RECENT DIASTOLIC BLOOD PRESSURE 80-89 MM HG: ICD-10-PCS | Mod: CPTII,,, | Performed by: INTERNAL MEDICINE

## 2023-01-09 PROCEDURE — 90471 FLU VACCINE (QUAD) GREATER THAN OR EQUAL TO 3YO PRESERVATIVE FREE IM: ICD-10-PCS | Mod: ,,, | Performed by: INTERNAL MEDICINE

## 2023-01-09 PROCEDURE — 90686 IIV4 VACC NO PRSV 0.5 ML IM: CPT | Mod: ,,, | Performed by: INTERNAL MEDICINE

## 2023-01-09 PROCEDURE — 1159F MED LIST DOCD IN RCRD: CPT | Mod: CPTII,,, | Performed by: INTERNAL MEDICINE

## 2023-01-09 PROCEDURE — 1159F PR MEDICATION LIST DOCUMENTED IN MEDICAL RECORD: ICD-10-PCS | Mod: CPTII,,, | Performed by: INTERNAL MEDICINE

## 2023-01-09 PROCEDURE — 3008F BODY MASS INDEX DOCD: CPT | Mod: CPTII,,, | Performed by: INTERNAL MEDICINE

## 2023-01-09 PROCEDURE — 1160F PR REVIEW ALL MEDS BY PRESCRIBER/CLIN PHARMACIST DOCUMENTED: ICD-10-PCS | Mod: CPTII,,, | Performed by: INTERNAL MEDICINE

## 2023-01-09 PROCEDURE — 3008F PR BODY MASS INDEX (BMI) DOCUMENTED: ICD-10-PCS | Mod: CPTII,,, | Performed by: INTERNAL MEDICINE

## 2023-01-09 PROCEDURE — 1160F RVW MEDS BY RX/DR IN RCRD: CPT | Mod: CPTII,,, | Performed by: INTERNAL MEDICINE

## 2023-01-09 PROCEDURE — 99214 OFFICE O/P EST MOD 30 MIN: CPT | Mod: 25,,, | Performed by: INTERNAL MEDICINE

## 2023-01-09 PROCEDURE — 3079F DIAST BP 80-89 MM HG: CPT | Mod: CPTII,,, | Performed by: INTERNAL MEDICINE

## 2023-01-09 PROCEDURE — 3075F PR MOST RECENT SYSTOLIC BLOOD PRESS GE 130-139MM HG: ICD-10-PCS | Mod: CPTII,,, | Performed by: INTERNAL MEDICINE

## 2023-01-09 RX ORDER — TIRZEPATIDE 5 MG/.5ML
5 INJECTION, SOLUTION SUBCUTANEOUS
Qty: 4 PEN | Refills: 1 | Status: SHIPPED | OUTPATIENT
Start: 2023-01-09 | End: 2023-02-13

## 2023-01-09 NOTE — PROGRESS NOTES
Subjective:      Patient ID: Walter Crawford is a 57 y.o. male.    Chief Complaint: Diabetes (1 month F/U)      HPI:  57 year old male here for diabetic revisit  Farxiga discontinued at last visit  Mounjaro 2.5 with no side effects  Needs flu vaccine      Past Medical History:  Past Medical History:   Diagnosis Date    ADHD (attention deficit hyperactivity disorder)     Benign essential hypertension 09/12/2022    Depression 09/12/2022    Hyperlipemia 09/12/2022    Microalbuminuria     Personal history of colonic polyps 05/18/2021    Dr Christianne Reeder    Seronegative rheumatoid arthritis     Type 2 diabetes mellitus without complication, without long-term current use of insulin 09/12/2022    Vitamin B12 deficiency      Past Surgical History:   Procedure Laterality Date    CHOLECYSTECTOMY      COLONOSCOPY W/ POLYPECTOMY  05/18/2021    Dr Christianne Reeder    EYE SURGERY       Review of patient's allergies indicates:  No Known Allergies  Current Outpatient Medications on File Prior to Visit   Medication Sig Dispense Refill    amLODIPine (NORVASC) 10 MG tablet Take 10 mg by mouth once daily.      atorvastatin (LIPITOR) 20 MG tablet Take 20 mg by mouth once daily.      buPROPion (WELLBUTRIN XL) 300 MG 24 hr tablet Take 1 tablet (300 mg total) by mouth once daily. 30 tablet 11    leflunomide (ARAVA) 20 MG Tab Take 20 mg by mouth once daily.      metFORMIN (GLUCOPHAGE) 1000 MG tablet Take 1,000 mg by mouth 2 (two) times daily.      olmesartan-hydrochlorothiazide (BENICAR HCT) 20-12.5 mg per tablet Take 1 tablet by mouth once daily. 90 tablet 3    pantoprazole (PROTONIX) 40 MG tablet Take 40 mg by mouth once daily.      paroxetine (PAXIL) 40 MG tablet Take 1 tablet by mouth once daily 30 tablet 0    tamsulosin (FLOMAX) 0.4 mg Cap Take 1 capsule by mouth every evening.      traZODone (DESYREL) 50 MG tablet Take 100 mg by mouth every evening. 2 tablets.      [DISCONTINUED] dapagliflozin (FARXIGA) 5 mg Tab tablet Take 2 tablets (10 mg  "total) by mouth once daily. 180 tablet 4     No current facility-administered medications on file prior to visit.     Social History     Socioeconomic History    Marital status: Unknown   Tobacco Use    Smoking status: Every Day     Packs/day: 1.00     Types: Cigarettes    Smokeless tobacco: Never   Substance and Sexual Activity    Alcohol use: Yes     Comment: Nightly    Drug use: Never    Sexual activity: Yes     Partners: Female     Family History   Problem Relation Age of Onset    Diabetes Mother     Hypertension Father     Dementia Father     Diabetes Sister     Depression Sister        Review of Systems  A comprehensive review of systems was performed and was negative with exception of what is documented above.     Objective:   /84 (BP Location: Right arm, Patient Position: Sitting, BP Method: Medium (Manual))   Pulse 90   Temp 97.2 °F (36.2 °C) (Temporal)   Resp 16   Ht 5' 11" (1.803 m)   Wt 100.7 kg (222 lb)   SpO2 98%   BMI 30.96 kg/m²   Physical Exam  General : Alert and oriented, No acute distress, afebrile.  Eye : PERRLA. EOMI. Normal conjunctiva, Sclerae are nonicteric.   Respiratory : Respirations are non-labored and clear to auscultation bilaterally. Symmetrical air entry bilaterally, no crackles, no wheezes, no rhonchi. No cyanosis, no clubbing.  Cardiovascular : Normal rate, Regular rhythm. No murmurs, rubs, or gallops. Pulses are 2+ throughout. No JVD. No Edema.  Gastrointestinal : Soft, nontender, non-distended, bowel sounds are present in all quadrants, no organomegaly, no guarding, no rebound.  Musculoskeletal : Normal range of motion throughout. No muscle tenderness.  Integumentary : Warm, moist, intact.  Neurologic : Alert, Oriented  Psychiatric : Cooperative, Appropriate mood & affect.   Assessment/ Plan:   1. Type 2 diabetes mellitus without complication, without long-term current use of insulin  Assessment & Plan:  Increase Mounjaro to 5mg  RTC 4 weeks      Other orders  -     " tirzepatide (MOUNJARO) 5 mg/0.5 mL PnIj; Inject 5 mg into the skin every 7 days.  Dispense: 4 pen; Refill: 1         Follow up in about 4 weeks (around 2/6/2023).

## 2023-02-06 DIAGNOSIS — E78.5 HYPERLIPIDEMIA, UNSPECIFIED HYPERLIPIDEMIA TYPE: ICD-10-CM

## 2023-02-06 DIAGNOSIS — E11.9 TYPE 2 DIABETES MELLITUS WITHOUT COMPLICATION, WITHOUT LONG-TERM CURRENT USE OF INSULIN: Primary | ICD-10-CM

## 2023-02-13 ENCOUNTER — OFFICE VISIT (OUTPATIENT)
Dept: INTERNAL MEDICINE | Facility: CLINIC | Age: 58
End: 2023-02-13
Payer: COMMERCIAL

## 2023-02-13 VITALS
DIASTOLIC BLOOD PRESSURE: 84 MMHG | HEART RATE: 105 BPM | TEMPERATURE: 98 F | RESPIRATION RATE: 16 BRPM | SYSTOLIC BLOOD PRESSURE: 136 MMHG | BODY MASS INDEX: 29.53 KG/M2 | OXYGEN SATURATION: 97 % | HEIGHT: 72 IN | WEIGHT: 218 LBS

## 2023-02-13 DIAGNOSIS — E78.5 HYPERLIPIDEMIA, UNSPECIFIED HYPERLIPIDEMIA TYPE: ICD-10-CM

## 2023-02-13 DIAGNOSIS — E11.9 TYPE 2 DIABETES MELLITUS WITHOUT COMPLICATION, WITHOUT LONG-TERM CURRENT USE OF INSULIN: ICD-10-CM

## 2023-02-13 LAB
LEFT EYE DM RETINOPATHY: NEGATIVE
RIGHT EYE DM RETINOPATHY: NEGATIVE

## 2023-02-13 PROCEDURE — 3079F PR MOST RECENT DIASTOLIC BLOOD PRESSURE 80-89 MM HG: ICD-10-PCS | Mod: CPTII,,, | Performed by: INTERNAL MEDICINE

## 2023-02-13 PROCEDURE — 1159F PR MEDICATION LIST DOCUMENTED IN MEDICAL RECORD: ICD-10-PCS | Mod: CPTII,,, | Performed by: INTERNAL MEDICINE

## 2023-02-13 PROCEDURE — 1160F RVW MEDS BY RX/DR IN RCRD: CPT | Mod: CPTII,,, | Performed by: INTERNAL MEDICINE

## 2023-02-13 PROCEDURE — 3079F DIAST BP 80-89 MM HG: CPT | Mod: CPTII,,, | Performed by: INTERNAL MEDICINE

## 2023-02-13 PROCEDURE — 1159F MED LIST DOCD IN RCRD: CPT | Mod: CPTII,,, | Performed by: INTERNAL MEDICINE

## 2023-02-13 PROCEDURE — 99214 OFFICE O/P EST MOD 30 MIN: CPT | Mod: ,,, | Performed by: INTERNAL MEDICINE

## 2023-02-13 PROCEDURE — 1160F PR REVIEW ALL MEDS BY PRESCRIBER/CLIN PHARMACIST DOCUMENTED: ICD-10-PCS | Mod: CPTII,,, | Performed by: INTERNAL MEDICINE

## 2023-02-13 PROCEDURE — 3008F BODY MASS INDEX DOCD: CPT | Mod: CPTII,,, | Performed by: INTERNAL MEDICINE

## 2023-02-13 PROCEDURE — 3008F PR BODY MASS INDEX (BMI) DOCUMENTED: ICD-10-PCS | Mod: CPTII,,, | Performed by: INTERNAL MEDICINE

## 2023-02-13 PROCEDURE — 99214 PR OFFICE/OUTPT VISIT, EST, LEVL IV, 30-39 MIN: ICD-10-PCS | Mod: ,,, | Performed by: INTERNAL MEDICINE

## 2023-02-13 PROCEDURE — 3075F PR MOST RECENT SYSTOLIC BLOOD PRESS GE 130-139MM HG: ICD-10-PCS | Mod: CPTII,,, | Performed by: INTERNAL MEDICINE

## 2023-02-13 PROCEDURE — 3075F SYST BP GE 130 - 139MM HG: CPT | Mod: CPTII,,, | Performed by: INTERNAL MEDICINE

## 2023-02-13 NOTE — PROGRESS NOTES
Subjective:      Patient ID: Walter Crawford is a 57 y.o. male.    Chief Complaint: Diabetes (4 week F/U Mounjaro)      HPI:  57-year-old male here today for revisit on Mounjaro; he is only been on the 5 mg for 2 weeks.  Plans to increase his 7.5.  Reports no side effects.  Strongly advised patient on alcohol cessation, currently drinking bourbon daily    Past Medical History:  Past Medical History:   Diagnosis Date    ADHD (attention deficit hyperactivity disorder)     Benign essential hypertension 09/12/2022    Depression 09/12/2022    Hyperlipemia 09/12/2022    Microalbuminuria     Personal history of colonic polyps 05/18/2021    Dr Christianne Reeder    Seronegative rheumatoid arthritis     Type 2 diabetes mellitus without complication, without long-term current use of insulin 09/12/2022    Vitamin B12 deficiency      Past Surgical History:   Procedure Laterality Date    CHOLECYSTECTOMY      COLONOSCOPY W/ POLYPECTOMY  05/18/2021    Dr Christianne Reeder    EYE SURGERY       Review of patient's allergies indicates:  No Known Allergies  Current Outpatient Medications on File Prior to Visit   Medication Sig Dispense Refill    amLODIPine (NORVASC) 10 MG tablet Take 10 mg by mouth once daily.      atorvastatin (LIPITOR) 20 MG tablet Take 20 mg by mouth once daily.      buPROPion (WELLBUTRIN XL) 300 MG 24 hr tablet Take 1 tablet (300 mg total) by mouth once daily. 30 tablet 11    leflunomide (ARAVA) 20 MG Tab Take 20 mg by mouth once daily.      metFORMIN (GLUCOPHAGE) 1000 MG tablet Take 1,000 mg by mouth 2 (two) times daily.      olmesartan-hydrochlorothiazide (BENICAR HCT) 20-12.5 mg per tablet Take 1 tablet by mouth once daily. 90 tablet 3    pantoprazole (PROTONIX) 40 MG tablet Take 40 mg by mouth once daily.      paroxetine (PAXIL) 40 MG tablet Take 1 tablet by mouth once daily 30 tablet 0    tamsulosin (FLOMAX) 0.4 mg Cap Take 1 capsule by mouth every evening.      traZODone (DESYREL) 50 MG tablet Take 100 mg by mouth every  evening. 2 tablets.      [DISCONTINUED] blood sugar diagnostic Strp 1 each by Misc.(Non-Drug; Combo Route) route once daily. 100 each 3    [DISCONTINUED] tirzepatide (MOUNJARO) 5 mg/0.5 mL PnIj Inject 5 mg into the skin every 7 days. 4 pen 1     No current facility-administered medications on file prior to visit.     Social History     Socioeconomic History    Marital status: Unknown   Tobacco Use    Smoking status: Every Day     Packs/day: 1.00     Types: Cigarettes    Smokeless tobacco: Never   Substance and Sexual Activity    Alcohol use: Yes     Comment: Nightly    Drug use: Never    Sexual activity: Yes     Partners: Female     Family History   Problem Relation Age of Onset    Diabetes Mother     Hypertension Father     Dementia Father     Diabetes Sister     Depression Sister        Review of Systems  A comprehensive review of systems was performed and was negative with exception of what is documented above.     Objective:   /84 (BP Location: Left arm, Patient Position: Sitting, BP Method: Medium (Manual))   Pulse 105   Temp 98 °F (36.7 °C) (Temporal)   Resp 16   Ht 6' (1.829 m)   Wt 98.9 kg (218 lb)   SpO2 97%   BMI 29.57 kg/m²   Physical Exam  General : Alert and oriented, No acute distress, afebrile.  Eye : PERRLA. EOMI. Normal conjunctiva, Sclerae are nonicteric.   Psychiatric : Cooperative, Appropriate mood & affect.   Assessment/ Plan:   1. Type 2 diabetes mellitus without complication, without long-term current use of insulin  Assessment & Plan:  Increase Mounjaro to 7.5mg  RTC 8 weeks with BMP and A1c    Orders:  -     blood sugar diagnostic Strp; 1 each by Misc.(Non-Drug; Combo Route) route once daily.  Dispense: 100 each; Refill: 3    2. Hyperlipidemia, unspecified hyperlipidemia type  -     blood sugar diagnostic Strp; 1 each by Misc.(Non-Drug; Combo Route) route once daily.  Dispense: 100 each; Refill: 3    Other orders  -     tirzepatide 7.5 mg/0.5 mL PnIj; Inject 7.5 mg into the skin  every 7 days.  Dispense: 4 pen; Refill: 1             Follow up in about 8 weeks (around 4/10/2023) for call for refill .

## 2023-02-22 ENCOUNTER — TELEPHONE (OUTPATIENT)
Dept: INTERNAL MEDICINE | Facility: CLINIC | Age: 58
End: 2023-02-22
Payer: COMMERCIAL

## 2023-02-22 DIAGNOSIS — K59.00 CONSTIPATION, UNSPECIFIED CONSTIPATION TYPE: ICD-10-CM

## 2023-02-22 DIAGNOSIS — R11.0 NAUSEA: Primary | ICD-10-CM

## 2023-02-22 DIAGNOSIS — E11.9 TYPE 2 DIABETES MELLITUS WITHOUT COMPLICATION, WITHOUT LONG-TERM CURRENT USE OF INSULIN: ICD-10-CM

## 2023-02-22 NOTE — TELEPHONE ENCOUNTER
Pt called back stating that he has been taking the 5 mg dose for about 6-8 weeks. He stated that he has been dealing with this feeling since he started the medication it just has gotten notably worse. He would like to know if there is something else he can take or if it is okay for the zofran and Linzess.

## 2023-02-22 NOTE — TELEPHONE ENCOUNTER
----- Message from David Pool MD sent at 2/22/2023  9:02 AM CST -----  Has he just done one shot?  When was the shot?  This is transient and should improve  Strongly advise cessation of alcohol  Ok for zofran, linzess 72 PRN   ----- Message -----  From: Nara Ruperto  Sent: 2/22/2023   8:54 AM CST  To: David Pool MD    Pt stated when he went from the 2.5MG to the 5MG of Mounjaro, he has not been feeling well.    Symptoms: Stomach hurting (uncomfortable and soreness), having to make himself eat, and Bowel movement not as much.    Please Advise

## 2023-02-23 RX ORDER — ONDANSETRON 4 MG/1
4 TABLET, ORALLY DISINTEGRATING ORAL EVERY 6 HOURS PRN
Qty: 30 TABLET | Refills: 0 | Status: SHIPPED | OUTPATIENT
Start: 2023-02-23 | End: 2023-03-20

## 2023-02-23 RX ORDER — TIRZEPATIDE 2.5 MG/.5ML
2.5 INJECTION, SOLUTION SUBCUTANEOUS
Qty: 4 PEN | Refills: 11 | Status: SHIPPED | OUTPATIENT
Start: 2023-02-23 | End: 2023-03-20

## 2023-03-06 ENCOUNTER — TELEPHONE (OUTPATIENT)
Dept: INTERNAL MEDICINE | Facility: CLINIC | Age: 58
End: 2023-03-06
Payer: COMMERCIAL

## 2023-03-06 DIAGNOSIS — E78.5 HYPERLIPIDEMIA, UNSPECIFIED HYPERLIPIDEMIA TYPE: ICD-10-CM

## 2023-03-06 DIAGNOSIS — E11.9 TYPE 2 DIABETES MELLITUS WITHOUT COMPLICATION, WITHOUT LONG-TERM CURRENT USE OF INSULIN: Primary | ICD-10-CM

## 2023-03-06 NOTE — TELEPHONE ENCOUNTER
----- Message from Kike Matthews sent at 3/6/2023  8:48 AM CST -----  Pt stated the first time he took the .5 of the monjuaro he had some stomach issues, but they've cleared up .. he's ready for the 7.5

## 2023-03-13 ENCOUNTER — TELEPHONE (OUTPATIENT)
Dept: INTERNAL MEDICINE | Facility: CLINIC | Age: 58
End: 2023-03-13
Payer: COMMERCIAL

## 2023-03-13 NOTE — TELEPHONE ENCOUNTER
----- Message from Milind Stallings MA sent at 3/13/2023 12:20 PM CDT -----  Regarding: PV 3/20/23 @ 10:00 Dr. Herman  1. Are there any outstanding tasks in the patient's chart? Yes, fasting labs    2. Is there any documentation in the chart? No    3.Has patient been seen in an ER, Urgent care clinic, or been admitted since last visit?  If yes, When, where, and why    4. Has patient seen any other healthcare providers since last visit?  If yes, when, where, and why    5. Has patient had any bloodwork or XR done since last visit?    6. Is patient signed up for patient portal?

## 2023-03-17 ENCOUNTER — LAB VISIT (OUTPATIENT)
Dept: LAB | Facility: HOSPITAL | Age: 58
End: 2023-03-17
Attending: INTERNAL MEDICINE
Payer: COMMERCIAL

## 2023-03-17 DIAGNOSIS — E11.9 TYPE 2 DIABETES MELLITUS WITHOUT COMPLICATION, WITHOUT LONG-TERM CURRENT USE OF INSULIN: ICD-10-CM

## 2023-03-17 LAB
ANION GAP SERPL CALC-SCNC: 10 MEQ/L
BUN SERPL-MCNC: 11.7 MG/DL (ref 8.4–25.7)
CALCIUM SERPL-MCNC: 9.9 MG/DL (ref 8.4–10.2)
CHLORIDE SERPL-SCNC: 106 MMOL/L (ref 98–107)
CO2 SERPL-SCNC: 26 MMOL/L (ref 22–29)
CREAT SERPL-MCNC: 0.81 MG/DL (ref 0.73–1.18)
CREAT/UREA NIT SERPL: 14
EST. AVERAGE GLUCOSE BLD GHB EST-MCNC: 125.5 MG/DL
GFR SERPLBLD CREATININE-BSD FMLA CKD-EPI: >60 MLS/MIN/1.73/M2
GLUCOSE SERPL-MCNC: 127 MG/DL (ref 74–100)
HBA1C MFR BLD: 6 %
POTASSIUM SERPL-SCNC: 4.8 MMOL/L (ref 3.5–5.1)
SODIUM SERPL-SCNC: 142 MMOL/L (ref 136–145)

## 2023-03-17 PROCEDURE — 36415 COLL VENOUS BLD VENIPUNCTURE: CPT

## 2023-03-17 PROCEDURE — 80048 BASIC METABOLIC PNL TOTAL CA: CPT

## 2023-03-17 PROCEDURE — 83036 HEMOGLOBIN GLYCOSYLATED A1C: CPT

## 2023-03-20 ENCOUNTER — OFFICE VISIT (OUTPATIENT)
Dept: INTERNAL MEDICINE | Facility: CLINIC | Age: 58
End: 2023-03-20
Payer: COMMERCIAL

## 2023-03-20 DIAGNOSIS — F32.A DEPRESSION, UNSPECIFIED DEPRESSION TYPE: ICD-10-CM

## 2023-03-20 DIAGNOSIS — E11.9 TYPE 2 DIABETES MELLITUS WITHOUT COMPLICATION, WITHOUT LONG-TERM CURRENT USE OF INSULIN: ICD-10-CM

## 2023-03-20 DIAGNOSIS — R55 SYNCOPE AND COLLAPSE: Primary | ICD-10-CM

## 2023-03-20 PROCEDURE — 1159F PR MEDICATION LIST DOCUMENTED IN MEDICAL RECORD: ICD-10-PCS | Mod: CPTII,,, | Performed by: INTERNAL MEDICINE

## 2023-03-20 PROCEDURE — 1160F RVW MEDS BY RX/DR IN RCRD: CPT | Mod: CPTII,,, | Performed by: INTERNAL MEDICINE

## 2023-03-20 PROCEDURE — 1160F PR REVIEW ALL MEDS BY PRESCRIBER/CLIN PHARMACIST DOCUMENTED: ICD-10-PCS | Mod: CPTII,,, | Performed by: INTERNAL MEDICINE

## 2023-03-20 PROCEDURE — 99214 PR OFFICE/OUTPT VISIT, EST, LEVL IV, 30-39 MIN: ICD-10-PCS | Mod: ,,, | Performed by: INTERNAL MEDICINE

## 2023-03-20 PROCEDURE — 1159F MED LIST DOCD IN RCRD: CPT | Mod: CPTII,,, | Performed by: INTERNAL MEDICINE

## 2023-03-20 PROCEDURE — 99214 OFFICE O/P EST MOD 30 MIN: CPT | Mod: ,,, | Performed by: INTERNAL MEDICINE

## 2023-03-20 RX ORDER — MULTIVIT WITH MINERALS/HERBS
1 TABLET ORAL DAILY
Start: 2023-03-20 | End: 2023-09-18

## 2023-03-20 RX ORDER — PAROXETINE HYDROCHLORIDE 40 MG/1
TABLET, FILM COATED ORAL
Qty: 30 TABLET | Refills: 0 | Status: SHIPPED | OUTPATIENT
Start: 2023-03-20 | End: 2023-05-16 | Stop reason: SDUPTHER

## 2023-03-20 NOTE — ASSESSMENT & PLAN NOTE
Advised on good B complex multivitamin available over-the-counter to support him while he detox from alcohol to include B1, B6 and B12  Zio patch monitor advise, referral to Cardiology placed, CT of the head and ultrasound carotids ordered.  Further recommendation to follow

## 2023-03-20 NOTE — PROGRESS NOTES
Subjective:      Patient ID: Walter Crawford is a 57 y.o. male.    Chief Complaint: Diabetes (1 Month f/u)      HPI:  57-year-old male here today for diabetic revisit he is currently on month 3 of Mounjaro  Concomitantly also taking metformin, does not routinely check his blood sugars.  On his way to see his family in [place]; reports syncopal episode at a convienent store   He was smoking his cigarette; he reports walking to his car; he had pre- syncopal and then he passed out.   He reports being out for about 5 sec.  He ate a candy bar but didn't report any change in his symptoms.   He does not check his sugar  He had not eaten that morning  He has essential tremor on exam  Hasn't drank in a week ; normally drinks bourbon every night.  He did have 3 beers last night.  Patient has never had this pronounced of a tremor on exam.  He does not admit to any palpitations or awareness of his heartbeat and no previous incident of presyncope or syncope.      Past Medical History:  Past Medical History:   Diagnosis Date    ADHD (attention deficit hyperactivity disorder)     Benign essential hypertension 09/12/2022    Depression 09/12/2022    Hyperlipemia 09/12/2022    Microalbuminuria     Musculoskeletal pain     Obesity, unspecified     Personal history of colonic polyps 05/18/2021    Dr Christianne Reeder    Seronegative rheumatoid arthritis     Type 2 diabetes mellitus without complication, without long-term current use of insulin 09/12/2022    Unspecified cataract     Vitamin B12 deficiency      Past Surgical History:   Procedure Laterality Date    Arthroscopic repair medial meniscus  10/15/2019    CATARACT EXTRACTION Right 05/26/2020    CATARACT EXTRACTION Left 11/10/2020    CHOLECYSTECTOMY      COLONOSCOPY W/ POLYPECTOMY  05/18/2021    Dr Christianne Reeder     Review of patient's allergies indicates:  No Known Allergies  Current Outpatient Medications on File Prior to Visit   Medication Sig Dispense Refill    amLODIPine (NORVASC)  10 MG tablet Take 10 mg by mouth once daily.      atorvastatin (LIPITOR) 20 MG tablet Take 20 mg by mouth once daily.      blood sugar diagnostic Strp 1 each by Misc.(Non-Drug; Combo Route) route once daily. 100 each 3    buPROPion (WELLBUTRIN XL) 300 MG 24 hr tablet Take 1 tablet (300 mg total) by mouth once daily. 30 tablet 11    leflunomide (ARAVA) 20 MG Tab Take 20 mg by mouth once daily.      olmesartan-hydrochlorothiazide (BENICAR HCT) 20-12.5 mg per tablet Take 1 tablet by mouth once daily. 90 tablet 3    paroxetine (PAXIL) 40 MG tablet Take 1 tablet by mouth once daily 30 tablet 0    tirzepatide 7.5 mg/0.5 mL PnIj Inject 7.5 mg into the skin every 7 days. 4 pen 11    traZODone (DESYREL) 50 MG tablet Take 2 tablets (100 mg total) by mouth every evening. 60 tablet 5    [DISCONTINUED] metFORMIN (GLUCOPHAGE) 1000 MG tablet Take 1,000 mg by mouth 2 (two) times daily.      [DISCONTINUED] linaCLOtide (LINZESS) 72 mcg Cap capsule Take 1 capsule (72 mcg total) by mouth before breakfast. (Patient not taking: Reported on 3/20/2023) 30 capsule 0    [DISCONTINUED] ondansetron (ZOFRAN-ODT) 4 MG TbDL Take 1 tablet (4 mg total) by mouth every 6 (six) hours as needed (nausea). (Patient not taking: Reported on 3/20/2023) 30 tablet 0    [DISCONTINUED] pantoprazole (PROTONIX) 40 MG tablet Take 40 mg by mouth once daily.      [DISCONTINUED] tamsulosin (FLOMAX) 0.4 mg Cap Take 1 capsule by mouth every evening.      [DISCONTINUED] tirzepatide (MOUNJARO) 2.5 mg/0.5 mL PnIj Inject 2.5 mg into the skin every 7 days. (Patient not taking: Reported on 3/20/2023) 4 pen 11    [DISCONTINUED] traZODone (DESYREL) 50 MG tablet Take 100 mg by mouth every evening. 2 tablets.       No current facility-administered medications on file prior to visit.     Social History     Socioeconomic History    Marital status: Unknown   Tobacco Use    Smoking status: Every Day     Packs/day: 1.00     Types: Cigarettes    Smokeless tobacco: Never   Substance  and Sexual Activity    Alcohol use: Yes     Comment: Nightly    Drug use: Never    Sexual activity: Yes     Partners: Female     Family History   Problem Relation Age of Onset    Diabetes Mother     Hypertension Father     Dementia Father     Diabetes Sister     Depression Sister        Review of Systems  A comprehensive review of systems was performed and was negative with exception of what is documented above.     Objective:   There were no vitals taken for this visit.  Physical Exam  General : Alert and oriented, No acute distress, afebrile.  Eye : PERRLA. EOMI. Normal conjunctiva, Sclerae are nonicteric.   Respiratory : Respirations are non-labored and clear to auscultation bilaterally. Symmetrical air entry bilaterally, no crackles, no wheezes, no rhonchi. No cyanosis, no clubbing.  Cardiovascular : Normal rate, Regular rhythm. No murmurs, rubs, or gallops. Pulses are 2+ throughout. No JVD. No Edema.  Musculoskeletal : Normal range of motion throughout. No muscle tenderness.  Integumentary : Warm, moist, intact.  Neurologic : Alert, Oriented, + essential tremor noted to right hand  Psychiatric : Cooperative, Appropriate mood & affect.   Assessment/ Plan:   1. Syncope and collapse  Assessment & Plan:  Advised on good B complex multivitamin available over-the-counter to support him while he detox from alcohol to include B1, B6 and B12  Zio patch monitor advise, referral to Cardiology placed, CT of the head and ultrasound carotids ordered.  Further recommendation to follow    Orders:  -     Ambulatory referral/consult to Cardiology; Future; Expected date: 03/27/2023  -     CT Head W Wo Contrast; Future; Expected date: 03/20/2023  -     CV Ultrasound Bilateral Doppler Carotid; Future    2. Type 2 diabetes mellitus without complication, without long-term current use of insulin  Assessment & Plan:  DC metformin      Other orders  -     b complex vitamins (B COMPLEX-VITAMIN B12) tablet; Take 1 tablet by mouth once  daily.             Follow up in about 3 months (around 6/20/2023) for DIABETIC REVISIT, NURSE PRACTITIONER, with labs prior to visit.

## 2023-03-21 NOTE — TELEPHONE ENCOUNTER
----- Message from Kike Matthews sent at 3/21/2023  1:25 PM CDT -----  Pt requesting orders to be faxed to [facility] ..   Got a call from his insurance company stating the cost is twice as much

## 2023-03-31 DIAGNOSIS — I10 BENIGN ESSENTIAL HYPERTENSION: Primary | ICD-10-CM

## 2023-03-31 DIAGNOSIS — E04.1 THYROID NODULE: Primary | ICD-10-CM

## 2023-03-31 RX ORDER — AMLODIPINE BESYLATE 10 MG/1
TABLET ORAL
Qty: 30 TABLET | Refills: 0 | Status: SHIPPED | OUTPATIENT
Start: 2023-03-31 | End: 2023-05-16 | Stop reason: SDUPTHER

## 2023-03-31 NOTE — TELEPHONE ENCOUNTER
----- Message from David Pool MD sent at 3/31/2023  8:20 AM CDT -----  CT of the head reviewed with no acute intracranial abnormality  ----- Message -----  From: Milind Stallings MA  Sent: 3/29/2023   5:04 PM CDT  To: David Pool MD

## 2023-04-03 ENCOUNTER — HOSPITAL ENCOUNTER (OUTPATIENT)
Dept: RADIOLOGY | Facility: HOSPITAL | Age: 58
Discharge: HOME | End: 2023-04-03
Attending: INTERNAL MEDICINE
Payer: COMMERCIAL

## 2023-04-03 DIAGNOSIS — E04.1 THYROID NODULE: ICD-10-CM

## 2023-04-03 PROCEDURE — 76536 US EXAM OF HEAD AND NECK: CPT | Mod: TC

## 2023-04-04 ENCOUNTER — TELEPHONE (OUTPATIENT)
Dept: INTERNAL MEDICINE | Facility: CLINIC | Age: 58
End: 2023-04-04
Payer: COMMERCIAL

## 2023-04-04 DIAGNOSIS — E04.1 THYROID NODULE: Primary | ICD-10-CM

## 2023-04-04 NOTE — TELEPHONE ENCOUNTER
Spoke to pt. Pt Verbally confirmed understanding.  Referral ordered.   Please fax to Svetlana at [phone]

## 2023-04-04 NOTE — TELEPHONE ENCOUNTER
----- Message from David Pool MD sent at 4/4/2023  9:03 AM CDT -----  Patient needs referral to IR for biopsy of thyroid nodule that is 4cm in size

## 2023-04-10 ENCOUNTER — HOSPITAL ENCOUNTER (OUTPATIENT)
Dept: RADIOLOGY | Facility: HOSPITAL | Age: 58
Discharge: HOME | End: 2023-04-10
Attending: INTERNAL MEDICINE
Payer: COMMERCIAL

## 2023-04-10 DIAGNOSIS — E04.1 THYROID NODULE: ICD-10-CM

## 2023-04-10 PROCEDURE — 76942 ECHO GUIDE FOR BIOPSY: CPT | Mod: TC

## 2023-04-10 RX ORDER — LIDOCAINE HYDROCHLORIDE 20 MG/ML
INJECTION, SOLUTION EPIDURAL; INFILTRATION; INTRACAUDAL; PERINEURAL
Status: DISCONTINUED
Start: 2023-04-10 | End: 2023-04-11 | Stop reason: HOSPADM

## 2023-04-12 LAB — PSYCHE PATHOLOGY RESULT: NORMAL

## 2023-04-18 DIAGNOSIS — D49.7 NONINVASIVE FOLLICULAR NEOPLASM OF THYROID WITH PAPILLARY-LIKE NUCLEAR FEATURES: ICD-10-CM

## 2023-04-18 DIAGNOSIS — E04.1 THYROID NODULE GREATER THAN OR EQUAL TO 1.5 CM IN DIAMETER INCIDENTALLY NOTED ON IMAGING STUDY: Primary | ICD-10-CM

## 2023-04-18 NOTE — PROGRESS NOTES
Thyroid biopsy results reviewed patient with pathology of undetermined significance case discussed with Dr. Vj Feliz and with the patient.  Please send referral to Dr. Vj Feliz for consideration for thyroid nodule removal.

## 2023-05-16 ENCOUNTER — OFFICE VISIT (OUTPATIENT)
Dept: SURGICAL ONCOLOGY | Facility: CLINIC | Age: 58
End: 2023-05-16
Payer: COMMERCIAL

## 2023-05-16 ENCOUNTER — TELEPHONE (OUTPATIENT)
Dept: INTERNAL MEDICINE | Facility: CLINIC | Age: 58
End: 2023-05-16
Payer: COMMERCIAL

## 2023-05-16 VITALS
WEIGHT: 215 LBS | DIASTOLIC BLOOD PRESSURE: 83 MMHG | BODY MASS INDEX: 29.12 KG/M2 | HEIGHT: 72 IN | SYSTOLIC BLOOD PRESSURE: 148 MMHG | HEART RATE: 73 BPM

## 2023-05-16 DIAGNOSIS — I10 BENIGN ESSENTIAL HYPERTENSION: ICD-10-CM

## 2023-05-16 DIAGNOSIS — D49.7 NONINVASIVE FOLLICULAR NEOPLASM OF THYROID WITH PAPILLARY-LIKE NUCLEAR FEATURES: ICD-10-CM

## 2023-05-16 DIAGNOSIS — E78.5 HYPERLIPIDEMIA, UNSPECIFIED HYPERLIPIDEMIA TYPE: Primary | ICD-10-CM

## 2023-05-16 DIAGNOSIS — E04.1 THYROID NODULE GREATER THAN OR EQUAL TO 1.5 CM IN DIAMETER INCIDENTALLY NOTED ON IMAGING STUDY: ICD-10-CM

## 2023-05-16 DIAGNOSIS — F32.A DEPRESSION, UNSPECIFIED DEPRESSION TYPE: ICD-10-CM

## 2023-05-16 DIAGNOSIS — E11.9 TYPE 2 DIABETES MELLITUS WITHOUT COMPLICATION, WITHOUT LONG-TERM CURRENT USE OF INSULIN: Primary | ICD-10-CM

## 2023-05-16 DIAGNOSIS — F17.200 NEEDS SMOKING CESSATION EDUCATION: ICD-10-CM

## 2023-05-16 PROCEDURE — 3079F DIAST BP 80-89 MM HG: CPT | Mod: CPTII,S$GLB,, | Performed by: OTOLARYNGOLOGY

## 2023-05-16 PROCEDURE — 3079F PR MOST RECENT DIASTOLIC BLOOD PRESSURE 80-89 MM HG: ICD-10-PCS | Mod: CPTII,S$GLB,, | Performed by: OTOLARYNGOLOGY

## 2023-05-16 PROCEDURE — 99204 OFFICE O/P NEW MOD 45 MIN: CPT | Mod: S$GLB,,, | Performed by: OTOLARYNGOLOGY

## 2023-05-16 PROCEDURE — 1159F MED LIST DOCD IN RCRD: CPT | Mod: CPTII,S$GLB,, | Performed by: OTOLARYNGOLOGY

## 2023-05-16 PROCEDURE — 1159F PR MEDICATION LIST DOCUMENTED IN MEDICAL RECORD: ICD-10-PCS | Mod: CPTII,S$GLB,, | Performed by: OTOLARYNGOLOGY

## 2023-05-16 PROCEDURE — 3008F BODY MASS INDEX DOCD: CPT | Mod: CPTII,S$GLB,, | Performed by: OTOLARYNGOLOGY

## 2023-05-16 PROCEDURE — 3077F PR MOST RECENT SYSTOLIC BLOOD PRESSURE >= 140 MM HG: ICD-10-PCS | Mod: CPTII,S$GLB,, | Performed by: OTOLARYNGOLOGY

## 2023-05-16 PROCEDURE — 99204 PR OFFICE/OUTPT VISIT, NEW, LEVL IV, 45-59 MIN: ICD-10-PCS | Mod: S$GLB,,, | Performed by: OTOLARYNGOLOGY

## 2023-05-16 PROCEDURE — 99999 PR PBB SHADOW E&M-EST. PATIENT-LVL IV: CPT | Mod: PBBFAC,,, | Performed by: OTOLARYNGOLOGY

## 2023-05-16 PROCEDURE — 99999 PR PBB SHADOW E&M-EST. PATIENT-LVL IV: ICD-10-PCS | Mod: PBBFAC,,, | Performed by: OTOLARYNGOLOGY

## 2023-05-16 PROCEDURE — 3077F SYST BP >= 140 MM HG: CPT | Mod: CPTII,S$GLB,, | Performed by: OTOLARYNGOLOGY

## 2023-05-16 PROCEDURE — 3008F PR BODY MASS INDEX (BMI) DOCUMENTED: ICD-10-PCS | Mod: CPTII,S$GLB,, | Performed by: OTOLARYNGOLOGY

## 2023-05-16 RX ORDER — OLMESARTAN MEDOXOMIL AND HYDROCHLOROTHIAZIDE 20/12.5 20; 12.5 MG/1; MG/1
1 TABLET ORAL DAILY
Qty: 90 TABLET | Refills: 3 | Status: SHIPPED | OUTPATIENT
Start: 2023-05-16 | End: 2024-05-15

## 2023-05-16 RX ORDER — AMLODIPINE BESYLATE 10 MG/1
10 TABLET ORAL DAILY
Qty: 90 TABLET | Refills: 3 | Status: SHIPPED | OUTPATIENT
Start: 2023-05-16

## 2023-05-16 RX ORDER — ATORVASTATIN CALCIUM 20 MG/1
20 TABLET, FILM COATED ORAL DAILY
Qty: 90 TABLET | Refills: 3 | Status: SHIPPED | OUTPATIENT
Start: 2023-05-16 | End: 2023-06-22 | Stop reason: SDUPTHER

## 2023-05-16 RX ORDER — PAROXETINE HYDROCHLORIDE 40 MG/1
40 TABLET, FILM COATED ORAL DAILY
Qty: 90 TABLET | Refills: 3 | Status: SHIPPED | OUTPATIENT
Start: 2023-05-16 | End: 2023-09-18 | Stop reason: SDUPTHER

## 2023-05-16 NOTE — PROGRESS NOTES
Chief Complaint   Patient presents with    Consult     Thyroid/Parathyroid         57 y.o. male presents for evaluation of a left-sided thyroid lesion which was recently biopsied.  This came back noting atypical epithelial cells present, with some features suggestive of papillary configuration.  There were no nuclear pseudo inclusions or unequivocal longitudinal nuclear grooves identified.  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    No history of external beam radiation or family history of thyroid malignancy.  No other obvious adenopathy or any other symptoms.          Past Medical History:   Diagnosis Date    ADHD (attention deficit hyperactivity disorder)     Benign essential hypertension 09/12/2022    Depression 09/12/2022    Hyperlipemia 09/12/2022    Microalbuminuria     Musculoskeletal pain     Obesity, unspecified     Personal history of colonic polyps 05/18/2021    Dr Christianne Reeder    Seronegative rheumatoid arthritis     Type 2 diabetes mellitus without complication, without long-term current use of insulin 09/12/2022    Unspecified cataract     Vitamin B12 deficiency        Past Surgical History:   Procedure Laterality Date    Arthroscopic repair medial meniscus  10/15/2019    CATARACT EXTRACTION Right 05/26/2020    CATARACT EXTRACTION Left 11/10/2020    CHOLECYSTECTOMY      COLONOSCOPY W/ POLYPECTOMY  05/18/2021    Dr Christianne Reeder       family history includes Dementia in his father; Depression in his sister; Diabetes in his mother and sister; Hypertension in his father.    Pt  reports that he has been smoking cigarettes. He has been smoking an average of 1 pack per day. He has never used smokeless tobacco. He reports current alcohol use. He reports that he does not use drugs.    Review of patient's allergies indicates:  No Known Allergies     Physical Exam    Vitals:    05/16/23 0837   BP: (!) 148/83   Pulse: 73     Body mass index is 29.16 kg/m².    General: AOx3, NAD  Cardiac:  Well perfused  Respiratory: Breathing without stridor or distress  Right Ear: External Auditory Canal WNL,TM w/o masses/lesions/perforations  Left Ear:  External Auditory Canal WNL,TM w/o masses/lesions/perforations  Nose: No gross nasal septal deviation.  Inferior Turbinates WNL bilaterally.  No septal perforation.  No masses/lesions.  Oral Cavity: FOM Soft, no masses palpated.  Oral Tongue mobile.  Hard Palate WNL.  Oropharynx: BOT WNL.  No masses/lesions noted.  Tonsillar fossa without lesions.  Soft palate without masses.  Midline uvula.  Neck: No palpable lymphadenopathy at I - VI.    Face: House Brackmann I bilaterally.  Eyes: Normal extra ocular motion bilaterally.          Assessment     1. Type 2 diabetes mellitus without complication, without long-term current use of insulin    2. Thyroid nodule greater than or equal to 1.5 cm in diameter incidentally noted on imaging study    3. 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  I think we would like to proceed with a repeat fine-needle aspiration with gene sequencing available for further clarification before we make a plan going forward.  We will set him up for that and make a plan afterwards.

## 2023-05-16 NOTE — TELEPHONE ENCOUNTER
----- Message from Nara Hickey sent at 5/16/2023  1:28 PM CDT -----  Refill:    Lipitor 20mg,   Olmesartan HCTZ 20 - 12.5mg  Norvasc 10mg  Paxil 40mg    Pharmacy: [facility]

## 2023-05-30 ENCOUNTER — TELEPHONE (OUTPATIENT)
Dept: INTERNAL MEDICINE | Facility: CLINIC | Age: 58
End: 2023-05-30
Payer: COMMERCIAL

## 2023-05-30 NOTE — TELEPHONE ENCOUNTER
----- Message from Elena Doyle sent at 5/30/2023  8:43 AM CDT -----  Regarding: test results  Type:  Test Results    Who Called: Walter  Name of Test (Lab/Mammo/Etc): Thyroid test with Dr Feliz  Date of Test:   Ordering Provider:   Where the test was performed:   Would the patient rather a call back or a response via [facility]? Call back  Best Call Back Number: [phone]  Additional Information:  Walter is requesting his test results he took @ Dr Feliz office.

## 2023-06-15 ENCOUNTER — TELEPHONE (OUTPATIENT)
Dept: INTERNAL MEDICINE | Facility: CLINIC | Age: 58
End: 2023-06-15
Payer: COMMERCIAL

## 2023-06-15 DIAGNOSIS — E11.9 TYPE 2 DIABETES MELLITUS WITHOUT COMPLICATION, WITHOUT LONG-TERM CURRENT USE OF INSULIN: Primary | ICD-10-CM

## 2023-06-15 NOTE — TELEPHONE ENCOUNTER
----- Message from Milind Stallings MA sent at 6/15/2023 10:11 AM CDT -----  Regarding: PV 6/22/23 @ 9:30 ANABELLE Hernandez  1. Are there any outstanding tasks in the patient's chart? Yes, fasting labs    2. Is there any documentation in the chart? No    3.Has patient been seen in an ER, Urgent care clinic, or been admitted since last visit?  If yes, When, where, and why    4. Has patient seen any other healthcare providers since last visit?  If yes, when, where, and why    5. Has patient had any bloodwork or XR done since last visit?    6. Is patient signed up for patient portal?

## 2023-06-19 ENCOUNTER — TELEPHONE (OUTPATIENT)
Dept: INTERNAL MEDICINE | Facility: CLINIC | Age: 58
End: 2023-06-19
Payer: COMMERCIAL

## 2023-06-19 ENCOUNTER — LAB VISIT (OUTPATIENT)
Dept: LAB | Facility: HOSPITAL | Age: 58
End: 2023-06-19
Attending: INTERNAL MEDICINE
Payer: COMMERCIAL

## 2023-06-19 DIAGNOSIS — E11.9 TYPE 2 DIABETES MELLITUS WITHOUT COMPLICATION, WITHOUT LONG-TERM CURRENT USE OF INSULIN: ICD-10-CM

## 2023-06-19 DIAGNOSIS — F32.A DEPRESSION, UNSPECIFIED DEPRESSION TYPE: Primary | ICD-10-CM

## 2023-06-19 LAB
ALBUMIN SERPL-MCNC: 4 G/DL (ref 3.5–5)
ALBUMIN/GLOB SERPL: 1.4 RATIO (ref 1.1–2)
ALP SERPL-CCNC: 71 UNIT/L (ref 40–150)
ALT SERPL-CCNC: 43 UNIT/L (ref 0–55)
AST SERPL-CCNC: 30 UNIT/L (ref 5–34)
BILIRUBIN DIRECT+TOT PNL SERPL-MCNC: 0.9 MG/DL
BUN SERPL-MCNC: 14 MG/DL (ref 8.4–25.7)
CALCIUM SERPL-MCNC: 9.3 MG/DL (ref 8.4–10.2)
CHLORIDE SERPL-SCNC: 105 MMOL/L (ref 98–107)
CHOLEST SERPL-MCNC: 171 MG/DL
CHOLEST/HDLC SERPL: 3 {RATIO} (ref 0–5)
CO2 SERPL-SCNC: 25 MMOL/L (ref 22–29)
CREAT SERPL-MCNC: 0.84 MG/DL (ref 0.73–1.18)
CREAT UR-MCNC: 68.7 MG/DL (ref 63–166)
EST. AVERAGE GLUCOSE BLD GHB EST-MCNC: 111.2 MG/DL
GFR SERPLBLD CREATININE-BSD FMLA CKD-EPI: >60 MLS/MIN/1.73/M2
GLOBULIN SER-MCNC: 2.9 GM/DL (ref 2.4–3.5)
GLUCOSE SERPL-MCNC: 134 MG/DL (ref 74–100)
HBA1C MFR BLD: 5.5 %
HDLC SERPL-MCNC: 51 MG/DL (ref 35–60)
LDLC SERPL CALC-MCNC: 108 MG/DL (ref 50–140)
MICROALBUMIN UR-MCNC: 6.8 UG/ML
MICROALBUMIN/CREAT RATIO PNL UR: 9.9 MG/GM CR (ref 0–30)
POTASSIUM SERPL-SCNC: 4.7 MMOL/L (ref 3.5–5.1)
PROT SERPL-MCNC: 6.9 GM/DL (ref 6.4–8.3)
SODIUM SERPL-SCNC: 139 MMOL/L (ref 136–145)
TRIGL SERPL-MCNC: 62 MG/DL (ref 34–140)
VLDLC SERPL CALC-MCNC: 12 MG/DL

## 2023-06-19 PROCEDURE — 36415 COLL VENOUS BLD VENIPUNCTURE: CPT

## 2023-06-19 PROCEDURE — 80053 COMPREHEN METABOLIC PANEL: CPT

## 2023-06-19 PROCEDURE — 80061 LIPID PANEL: CPT

## 2023-06-19 PROCEDURE — 83036 HEMOGLOBIN GLYCOSYLATED A1C: CPT

## 2023-06-19 PROCEDURE — 82043 UR ALBUMIN QUANTITATIVE: CPT

## 2023-06-19 RX ORDER — BUPROPION HYDROCHLORIDE 300 MG/1
300 TABLET ORAL DAILY
Qty: 30 TABLET | Refills: 11 | Status: SHIPPED | OUTPATIENT
Start: 2023-06-19 | End: 2023-08-30

## 2023-06-19 NOTE — TELEPHONE ENCOUNTER
----- Message from Kike Matthews sent at 6/19/2023 11:05 AM CDT -----  Bupron  Mg XL   Qty 30  [facility] - [place]

## 2023-06-22 ENCOUNTER — OFFICE VISIT (OUTPATIENT)
Dept: INTERNAL MEDICINE | Facility: CLINIC | Age: 58
End: 2023-06-22
Payer: COMMERCIAL

## 2023-06-22 VITALS
BODY MASS INDEX: 29.12 KG/M2 | SYSTOLIC BLOOD PRESSURE: 130 MMHG | HEART RATE: 80 BPM | DIASTOLIC BLOOD PRESSURE: 83 MMHG | RESPIRATION RATE: 16 BRPM | WEIGHT: 215 LBS | OXYGEN SATURATION: 98 % | TEMPERATURE: 98 F | HEIGHT: 72 IN

## 2023-06-22 DIAGNOSIS — I10 BENIGN ESSENTIAL HYPERTENSION: ICD-10-CM

## 2023-06-22 DIAGNOSIS — E78.5 HYPERLIPIDEMIA, UNSPECIFIED HYPERLIPIDEMIA TYPE: ICD-10-CM

## 2023-06-22 DIAGNOSIS — E78.2 MIXED HYPERLIPIDEMIA: Primary | ICD-10-CM

## 2023-06-22 DIAGNOSIS — E11.9 TYPE 2 DIABETES MELLITUS WITHOUT COMPLICATION, WITHOUT LONG-TERM CURRENT USE OF INSULIN: ICD-10-CM

## 2023-06-22 DIAGNOSIS — R10.9 ABDOMINAL PAIN, UNSPECIFIED ABDOMINAL LOCATION: ICD-10-CM

## 2023-06-22 PROCEDURE — 3075F SYST BP GE 130 - 139MM HG: CPT | Mod: CPTII,,, | Performed by: NURSE PRACTITIONER

## 2023-06-22 PROCEDURE — 3061F NEG MICROALBUMINURIA REV: CPT | Mod: CPTII,,, | Performed by: NURSE PRACTITIONER

## 2023-06-22 PROCEDURE — 1160F RVW MEDS BY RX/DR IN RCRD: CPT | Mod: CPTII,,, | Performed by: NURSE PRACTITIONER

## 2023-06-22 PROCEDURE — 3008F PR BODY MASS INDEX (BMI) DOCUMENTED: ICD-10-PCS | Mod: CPTII,,, | Performed by: NURSE PRACTITIONER

## 2023-06-22 PROCEDURE — 99214 PR OFFICE/OUTPT VISIT, EST, LEVL IV, 30-39 MIN: ICD-10-PCS | Mod: ,,, | Performed by: NURSE PRACTITIONER

## 2023-06-22 PROCEDURE — 99214 OFFICE O/P EST MOD 30 MIN: CPT | Mod: ,,, | Performed by: NURSE PRACTITIONER

## 2023-06-22 PROCEDURE — 3079F PR MOST RECENT DIASTOLIC BLOOD PRESSURE 80-89 MM HG: ICD-10-PCS | Mod: CPTII,,, | Performed by: NURSE PRACTITIONER

## 2023-06-22 PROCEDURE — 3008F BODY MASS INDEX DOCD: CPT | Mod: CPTII,,, | Performed by: NURSE PRACTITIONER

## 2023-06-22 PROCEDURE — 3066F PR DOCUMENTATION OF TREATMENT FOR NEPHROPATHY: ICD-10-PCS | Mod: CPTII,,, | Performed by: NURSE PRACTITIONER

## 2023-06-22 PROCEDURE — 1159F MED LIST DOCD IN RCRD: CPT | Mod: CPTII,,, | Performed by: NURSE PRACTITIONER

## 2023-06-22 PROCEDURE — 1159F PR MEDICATION LIST DOCUMENTED IN MEDICAL RECORD: ICD-10-PCS | Mod: CPTII,,, | Performed by: NURSE PRACTITIONER

## 2023-06-22 PROCEDURE — 3061F PR NEG MICROALBUMINURIA RESULT DOCUMENTED/REVIEW: ICD-10-PCS | Mod: CPTII,,, | Performed by: NURSE PRACTITIONER

## 2023-06-22 PROCEDURE — 3079F DIAST BP 80-89 MM HG: CPT | Mod: CPTII,,, | Performed by: NURSE PRACTITIONER

## 2023-06-22 PROCEDURE — 3066F NEPHROPATHY DOC TX: CPT | Mod: CPTII,,, | Performed by: NURSE PRACTITIONER

## 2023-06-22 PROCEDURE — 3075F PR MOST RECENT SYSTOLIC BLOOD PRESS GE 130-139MM HG: ICD-10-PCS | Mod: CPTII,,, | Performed by: NURSE PRACTITIONER

## 2023-06-22 PROCEDURE — 1160F PR REVIEW ALL MEDS BY PRESCRIBER/CLIN PHARMACIST DOCUMENTED: ICD-10-PCS | Mod: CPTII,,, | Performed by: NURSE PRACTITIONER

## 2023-06-22 RX ORDER — ATORVASTATIN CALCIUM 40 MG/1
40 TABLET, FILM COATED ORAL DAILY
Qty: 90 TABLET | Refills: 3 | Status: SHIPPED | OUTPATIENT
Start: 2023-06-22 | End: 2024-02-08 | Stop reason: SDUPTHER

## 2023-06-22 RX ORDER — PANTOPRAZOLE SODIUM 40 MG/1
40 TABLET, DELAYED RELEASE ORAL DAILY
Qty: 30 TABLET | Refills: 5 | Status: SHIPPED | OUTPATIENT
Start: 2023-06-22 | End: 2023-08-30

## 2023-06-22 NOTE — ASSESSMENT & PLAN NOTE
Lab Results   Component Value Date    .00 06/19/2023    TRIG 62 06/19/2023    HDL 51 06/19/2023    TOTALCHOLEST 3 06/19/2023     Increase Atorvastatin 40mg daily. Goal LDL <70.   Stressed importance of dietary modifications. Follow a low cholesterol, low saturated fat diet with less that 200mg of cholesterol a day.  Avoid fried foods and high saturated fats (high saturated fats less than 7% of calories).  Add Flax Seed/Fish Oil supplements to diet. Increase dietary fiber.  Regular exercise can reduce LDL and raise HDL. Stressed importance of physical activity 5 times per week for 30 minutes per day.

## 2023-06-22 NOTE — ASSESSMENT & PLAN NOTE
Start Pantoprazole 40mg daily.  US Abdomen 12/2022 - Fatty liver  Referred to GI for EGD  Avoid spicy, acidic, fried foods and alcohol.  Eat 2-3 hours before going to bed.  Avoid tight clothing, chew food thoroughly.  Reduce caffeine intake, avoid soda.

## 2023-06-22 NOTE — ASSESSMENT & PLAN NOTE
Lab Results   Component Value Date    HGBA1C 5.5 06/19/2023    HGBA1C 6.0 03/17/2023    .00 06/19/2023    CREATININE 0.84 06/19/2023      Continue Mounjaro 7.5mg weekly  On ARB and Statin according to guidelines.  Follow ADA Diet. Avoid soda, simple sweets, and limit rice/pasta/breads/starches (no more than 45-50 grams per meal).  Maintain healthy weight with goal BMI <30.  Exercise 5 times per week for 30 minutes per day.  Stressed importance of daily foot exams.  Stressed importance of annual dilated eye exam.

## 2023-06-22 NOTE — ASSESSMENT & PLAN NOTE
Well controlled.   Continue Olmesartan 20mg / HCTZ 12.5mg + Amlodipine 10mg daily   Low Sodium Diet (DASH Diet - Less than 2 grams of sodium per day).  Monitor blood pressure daily and log. Report consistent numbers greater than 140/90.  Maintain healthy weight with goal BMI <30. Exercise 30 minutes per day, 5 days per week.

## 2023-06-22 NOTE — PROGRESS NOTES
Patient ID: [id number]     Chief Complaint: Diabetes (3months f/u )      HPI:     Walter Crawford is a 57 y.o. male here today for a follow up. Started on Mounjaro at last visit. Tolerating well. Still complaining of mild epigastric/RUQ pain daily. US Abdomen showed fatty liver disease. Thyroid nodule incidentally found when carotid US performed in 3/2023. S/p biopsy that showed some atypical cells.  He was to have FNA with gene sequencing per ENT notes on 5/2023. Patient feels this was completed but I do not see pathology report or notes from repeat FNA. Will follow up on this. No other complaints today.     Past Medical History:   Diagnosis Date    ADHD (attention deficit hyperactivity disorder)     Benign essential hypertension 09/12/2022    Depression 09/12/2022    Hyperlipemia 09/12/2022    Microalbuminuria     Musculoskeletal pain     Obesity, unspecified     Personal history of colonic polyps 05/18/2021    Dr Christianne Reeder    Seronegative rheumatoid arthritis     Type 2 diabetes mellitus without complication, without long-term current use of insulin 09/12/2022    Unspecified cataract     Vitamin B12 deficiency         Past Surgical History:   Procedure Laterality Date    Arthroscopic repair medial meniscus  10/15/2019    CATARACT EXTRACTION Right 05/26/2020    CATARACT EXTRACTION Left 11/10/2020    CHOLECYSTECTOMY      COLONOSCOPY W/ POLYPECTOMY  05/18/2021    Dr Christianne Reeder        Social History     Socioeconomic History    Marital status:    Tobacco Use    Smoking status: Every Day     Packs/day: 1.00     Types: Cigarettes    Smokeless tobacco: Never    Tobacco comments:     Patient would like to quit smoking    Substance and Sexual Activity    Alcohol use: Yes     Comment: Nightly    Drug use: Never    Sexual activity: Yes     Partners: Female        Current Outpatient Medications   Medication Instructions    amLODIPine (NORVASC) 10 mg, Oral, Daily    atorvastatin (LIPITOR) 40 mg, Oral, Daily    b  complex vitamins (B COMPLEX-VITAMIN B12) tablet 1 tablet, Oral, Daily    blood sugar diagnostic Strp 1 each, Misc.(Non-Drug; Combo Route), Daily    buPROPion (WELLBUTRIN XL) 300 mg, Oral, Daily    leflunomide (ARAVA) 20 mg, Oral, Daily    olmesartan-hydrochlorothiazide (BENICAR HCT) 20-12.5 mg per tablet 1 tablet, Oral, Daily    pantoprazole (PROTONIX) 40 mg, Oral, Daily    paroxetine (PAXIL) 40 mg, Oral, Daily    tirzepatide 7.5 mg, Subcutaneous, Every 7 days    traZODone (DESYREL) 100 mg, Oral, Nightly       Review of patient's allergies indicates:  No Known Allergies     Patient Care Team:  David Pool MD as PCP - General (Internal Medicine)  Tavia Helton MD as Consulting Physician (Rheumatology)  Jose Willard MD (Dermatology)  Ron Oliveros DO as Consulting Physician (Cardiology)  Christianne Reeder III, MD as Consulting Physician (Gastroenterology)  Deniz Platt II, MD (Orthopedic Surgery)  Vj Feliz Jr., MD as Consulting Physician (Otolaryngology)     Subjective:     Review of Systems    12 point review of systems conducted, negative except as stated in the history of present illness. See HPI for details.    Objective:     Visit Vitals  /83 (BP Location: Right arm)   Pulse 80   Temp 98 °F (36.7 °C)   Resp 16   Ht 6' (1.829 m)   Wt 97.5 kg (215 lb)   SpO2 98%   BMI 29.16 kg/m²       Physical Exam  Vitals and nursing note reviewed.   Constitutional:       General: He is not in acute distress.     Appearance: He is not ill-appearing.   HENT:      Head: Normocephalic and atraumatic.      Mouth/Throat:      Mouth: Mucous membranes are moist.      Pharynx: Oropharynx is clear.   Eyes:      General: No scleral icterus.     Extraocular Movements: Extraocular movements intact.      Conjunctiva/sclera: Conjunctivae normal.      Pupils: Pupils are equal, round, and reactive to light.   Neck:      Vascular: No carotid bruit.   Cardiovascular:      Rate and Rhythm: Normal rate and regular  rhythm.      Heart sounds: No murmur heard.    No friction rub. No gallop.   Pulmonary:      Effort: Pulmonary effort is normal. No respiratory distress.      Breath sounds: Normal breath sounds. No wheezing, rhonchi or rales.   Abdominal:      General: Abdomen is flat. Bowel sounds are normal. There is no distension.      Palpations: Abdomen is soft. There is no mass.      Tenderness: There is no abdominal tenderness.   Musculoskeletal:         General: Normal range of motion.      Cervical back: Normal range of motion and neck supple.   Skin:     General: Skin is warm and dry.   Neurological:      General: No focal deficit present.      Mental Status: He is alert.   Psychiatric:         Mood and Affect: Mood normal.       Labs Reviewed:     Chemistry:  Lab Results   Component Value Date     06/19/2023    K 4.7 06/19/2023    CHLORIDE 105 06/19/2023    BUN 14.0 06/19/2023    CREATININE 0.84 06/19/2023    EGFRNORACEVR >60 06/19/2023    GLUCOSE 134 (H) 06/19/2023    CALCIUM 9.3 06/19/2023    ALKPHOS 71 06/19/2023    LABPROT 6.9 06/19/2023    ALBUMIN 4.0 06/19/2023    BILIDIR 0.4 05/18/2021    IBILI 0.50 05/18/2021    AST 30 06/19/2023    ALT 43 06/19/2023    TSH 3.872 (H) 02/19/2019    PSA 0.63 02/19/2019        Lab Results   Component Value Date    HGBA1C 5.5 06/19/2023        Hematology:  Lab Results   Component Value Date    WBC 5.6 09/20/2021    HGB 14.9 09/20/2021    HCT 45.7 09/20/2021     09/20/2021       Lipid Panel:  Lab Results   Component Value Date    CHOL 171 06/19/2023    HDL 51 06/19/2023    .00 06/19/2023    TRIG 62 06/19/2023    TOTALCHOLEST 3 06/19/2023        Urine:  Lab Results   Component Value Date    COLORUA Yellow 09/08/2022    APPEARANCEUA Clear 09/08/2022    SGUA 1.017 09/08/2022    PHUA 6.0 09/08/2022    PROTEINUA Negative 09/08/2022    GLUCOSEUA 3+ (A) 09/08/2022    KETONESUA Negative 09/08/2022    BLOODUA Negative 09/08/2022    NITRITESUA Negative 09/08/2022     LEUKOCYTESUR Negative 09/08/2022    RBCUA <5 09/08/2022    WBCUA <5 09/08/2022    BACTERIA None Seen 09/08/2022    CREATRANDUR 68.7 06/19/2023        Assessment:       ICD-10-CM ICD-9-CM   1. Mixed hyperlipidemia  E78.2 272.2   2. Benign essential hypertension  I10 401.1   3. Type 2 diabetes mellitus without complication, without long-term current use of insulin  E11.9 250.00   4. Hyperlipidemia, unspecified hyperlipidemia type  E78.5 272.4   5. Abdominal pain, unspecified abdominal location  R10.9 789.00        Plan:     1. Mixed hyperlipidemia  Assessment & Plan:  Lab Results   Component Value Date    .00 06/19/2023    TRIG 62 06/19/2023    HDL 51 06/19/2023    TOTALCHOLEST 3 06/19/2023     Increase Atorvastatin 40mg daily. Goal LDL <70.   Stressed importance of dietary modifications. Follow a low cholesterol, low saturated fat diet with less that 200mg of cholesterol a day.  Avoid fried foods and high saturated fats (high saturated fats less than 7% of calories).  Add Flax Seed/Fish Oil supplements to diet. Increase dietary fiber.  Regular exercise can reduce LDL and raise HDL. Stressed importance of physical activity 5 times per week for 30 minutes per day.         2. Benign essential hypertension  Assessment & Plan:  Well controlled.   Continue Olmesartan 20mg / HCTZ 12.5mg + Amlodipine 10mg daily   Low Sodium Diet (DASH Diet - Less than 2 grams of sodium per day).  Monitor blood pressure daily and log. Report consistent numbers greater than 140/90.  Maintain healthy weight with goal BMI <30. Exercise 30 minutes per day, 5 days per week.          3. Type 2 diabetes mellitus without complication, without long-term current use of insulin  Assessment & Plan:  Lab Results   Component Value Date    HGBA1C 5.5 06/19/2023    HGBA1C 6.0 03/17/2023    .00 06/19/2023    CREATININE 0.84 06/19/2023      Continue Mounjaro 7.5mg weekly  On ARB and Statin according to guidelines.  Follow ADA Diet. Avoid soda,  simple sweets, and limit rice/pasta/breads/starches (no more than 45-50 grams per meal).  Maintain healthy weight with goal BMI <30.  Exercise 5 times per week for 30 minutes per day.  Stressed importance of daily foot exams.  Stressed importance of annual dilated eye exam.            4. Hyperlipidemia, unspecified hyperlipidemia type  Assessment & Plan:  Lab Results   Component Value Date    .00 06/19/2023    TRIG 62 06/19/2023    HDL 51 06/19/2023    TOTALCHOLEST 3 06/19/2023     Increase Atorvastatin 40mg daily. Goal LDL <70.   Stressed importance of dietary modifications. Follow a low cholesterol, low saturated fat diet with less that 200mg of cholesterol a day.  Avoid fried foods and high saturated fats (high saturated fats less than 7% of calories).  Add Flax Seed/Fish Oil supplements to diet. Increase dietary fiber.  Regular exercise can reduce LDL and raise HDL. Stressed importance of physical activity 5 times per week for 30 minutes per day.       Orders:  -     atorvastatin (LIPITOR) 40 MG tablet; Take 1 tablet (40 mg total) by mouth once daily.  Dispense: 90 tablet; Refill: 3    5. Abdominal pain, unspecified abdominal location  Assessment & Plan:  Start Pantoprazole 40mg daily.  US Abdomen 12/2022 - Fatty liver  Referred to GI for EGD  Avoid spicy, acidic, fried foods and alcohol.  Eat 2-3 hours before going to bed.  Avoid tight clothing, chew food thoroughly.  Reduce caffeine intake, avoid soda.    Orders:  -     Ambulatory referral/consult to Gastroenterology; Future; Expected date: 06/29/2023    Other orders  -     pantoprazole (PROTONIX) 40 MG tablet; Take 1 tablet (40 mg total) by mouth once daily.  Dispense: 30 tablet; Refill: 5         Follow up in about 3 months (around 9/22/2023) for Routine Follow Up. In addition to their scheduled follow up, the patient has also been instructed to follow up on as needed basis.     Future Appointments   Date Time Provider Department Center   9/18/2023   9:00 AM David Pool MD [facility] [id number]        ANABELLE Iglesias

## 2023-08-10 ENCOUNTER — OFFICE VISIT (OUTPATIENT)
Dept: INTERNAL MEDICINE | Facility: CLINIC | Age: 58
End: 2023-08-10
Payer: COMMERCIAL

## 2023-08-10 VITALS
TEMPERATURE: 98 F | BODY MASS INDEX: 28.66 KG/M2 | DIASTOLIC BLOOD PRESSURE: 84 MMHG | RESPIRATION RATE: 16 BRPM | SYSTOLIC BLOOD PRESSURE: 130 MMHG | WEIGHT: 211.63 LBS | OXYGEN SATURATION: 98 % | HEART RATE: 80 BPM | HEIGHT: 72 IN

## 2023-08-10 DIAGNOSIS — I10 BENIGN ESSENTIAL HYPERTENSION: Primary | ICD-10-CM

## 2023-08-10 PROCEDURE — 3008F PR BODY MASS INDEX (BMI) DOCUMENTED: ICD-10-PCS | Mod: CPTII,,, | Performed by: NURSE PRACTITIONER

## 2023-08-10 PROCEDURE — 3075F SYST BP GE 130 - 139MM HG: CPT | Mod: CPTII,,, | Performed by: NURSE PRACTITIONER

## 2023-08-10 PROCEDURE — 1160F PR REVIEW ALL MEDS BY PRESCRIBER/CLIN PHARMACIST DOCUMENTED: ICD-10-PCS | Mod: CPTII,,, | Performed by: NURSE PRACTITIONER

## 2023-08-10 PROCEDURE — 1159F MED LIST DOCD IN RCRD: CPT | Mod: CPTII,,, | Performed by: NURSE PRACTITIONER

## 2023-08-10 PROCEDURE — 1159F PR MEDICATION LIST DOCUMENTED IN MEDICAL RECORD: ICD-10-PCS | Mod: CPTII,,, | Performed by: NURSE PRACTITIONER

## 2023-08-10 PROCEDURE — 3066F NEPHROPATHY DOC TX: CPT | Mod: CPTII,,, | Performed by: NURSE PRACTITIONER

## 2023-08-10 PROCEDURE — 3044F PR MOST RECENT HEMOGLOBIN A1C LEVEL <7.0%: ICD-10-PCS | Mod: CPTII,,, | Performed by: NURSE PRACTITIONER

## 2023-08-10 PROCEDURE — 3044F HG A1C LEVEL LT 7.0%: CPT | Mod: CPTII,,, | Performed by: NURSE PRACTITIONER

## 2023-08-10 PROCEDURE — 3066F PR DOCUMENTATION OF TREATMENT FOR NEPHROPATHY: ICD-10-PCS | Mod: CPTII,,, | Performed by: NURSE PRACTITIONER

## 2023-08-10 PROCEDURE — 3008F BODY MASS INDEX DOCD: CPT | Mod: CPTII,,, | Performed by: NURSE PRACTITIONER

## 2023-08-10 PROCEDURE — 3061F PR NEG MICROALBUMINURIA RESULT DOCUMENTED/REVIEW: ICD-10-PCS | Mod: CPTII,,, | Performed by: NURSE PRACTITIONER

## 2023-08-10 PROCEDURE — 3079F DIAST BP 80-89 MM HG: CPT | Mod: CPTII,,, | Performed by: NURSE PRACTITIONER

## 2023-08-10 PROCEDURE — 99213 PR OFFICE/OUTPT VISIT, EST, LEVL III, 20-29 MIN: ICD-10-PCS | Mod: ,,, | Performed by: NURSE PRACTITIONER

## 2023-08-10 PROCEDURE — 99213 OFFICE O/P EST LOW 20 MIN: CPT | Mod: ,,, | Performed by: NURSE PRACTITIONER

## 2023-08-10 PROCEDURE — 3079F PR MOST RECENT DIASTOLIC BLOOD PRESSURE 80-89 MM HG: ICD-10-PCS | Mod: CPTII,,, | Performed by: NURSE PRACTITIONER

## 2023-08-10 PROCEDURE — 3061F NEG MICROALBUMINURIA REV: CPT | Mod: CPTII,,, | Performed by: NURSE PRACTITIONER

## 2023-08-10 PROCEDURE — 3075F PR MOST RECENT SYSTOLIC BLOOD PRESS GE 130-139MM HG: ICD-10-PCS | Mod: CPTII,,, | Performed by: NURSE PRACTITIONER

## 2023-08-10 PROCEDURE — 1160F RVW MEDS BY RX/DR IN RCRD: CPT | Mod: CPTII,,, | Performed by: NURSE PRACTITIONER

## 2023-08-10 NOTE — ASSESSMENT & PLAN NOTE
Well controlled.   Continue Olmesartan 20mg / HCTZ 12.5mg + Amlodipine 10mg daily   Discussed vasodilatory effects of CCB - elevated legs, Na restriction, etc.  Low Sodium Diet (DASH Diet - Less than 2 grams of sodium per day).  Monitor blood pressure daily and log. Report consistent numbers greater than 140/90.  Maintain healthy weight with goal BMI <30. Exercise 30 minutes per day, 5 days per week.

## 2023-08-10 NOTE — PROGRESS NOTES
Patient ID: [id number]     Chief Complaint: Follow-up (Both feet swelling )      HPI:     Walter Crawford is a 57 y.o. male here today for a problem visit. Complaining of bilateral lower extremity edema that began yesterday. Drastically improved today. Noticed as he was sitting outside in the heat drinking a cocktail. Improved with elevation and ambulation. No other complaints today.     Past Medical History:   Diagnosis Date    ADHD (attention deficit hyperactivity disorder)     Benign essential hypertension 09/12/2022    Depression 09/12/2022    Hyperlipemia 09/12/2022    Microalbuminuria     Musculoskeletal pain     Obesity, unspecified     Personal history of colonic polyps 05/18/2021    Dr Christianne Reeder    Seronegative rheumatoid arthritis     Type 2 diabetes mellitus without complication, without long-term current use of insulin 09/12/2022    Unspecified cataract     Vitamin B12 deficiency         Past Surgical History:   Procedure Laterality Date    Arthroscopic repair medial meniscus  10/15/2019    CATARACT EXTRACTION Right 05/26/2020    CATARACT EXTRACTION Left 11/10/2020    CHOLECYSTECTOMY      COLONOSCOPY W/ POLYPECTOMY  05/18/2021    Dr Christianne Reeder        Social History     Tobacco Use    Smoking status: Every Day     Current packs/day: 1.00     Types: Cigarettes    Smokeless tobacco: Current    Tobacco comments:     Patient would like to quit smoking    Substance and Sexual Activity    Alcohol use: Yes     Comment: Nightly    Drug use: Never    Sexual activity: Yes     Partners: Female        Current Outpatient Medications   Medication Instructions    amLODIPine (NORVASC) 10 mg, Oral, Daily    atorvastatin (LIPITOR) 40 mg, Oral, Daily    b complex vitamins (B COMPLEX-VITAMIN B12) tablet 1 tablet, Oral, Daily    blood sugar diagnostic Strp 1 each, Misc.(Non-Drug; Combo Route), Daily    buPROPion (WELLBUTRIN XL) 300 mg, Oral, Daily    leflunomide (ARAVA) 20 mg, Oral, Daily    olmesartan-hydrochlorothiazide  (BENICAR HCT) 20-12.5 mg per tablet 1 tablet, Oral, Daily    pantoprazole (PROTONIX) 40 mg, Oral, Daily    paroxetine (PAXIL) 40 mg, Oral, Daily    tirzepatide 7.5 mg, Subcutaneous, Every 7 days    traZODone (DESYREL) 100 mg, Oral, Nightly       Review of patient's allergies indicates:  No Known Allergies     Patient Care Team:  David Pool MD as PCP - General (Internal Medicine)  Tavia Helton MD as Consulting Physician (Rheumatology)  Jose Willard MD (Dermatology)  Ron Oliveros DO as Consulting Physician (Cardiology)  Christianne Reeder III, MD as Consulting Physician (Gastroenterology)  Deniz Platt II, MD (Orthopedic Surgery)  Vj Feliz Jr., MD as Consulting Physician (Otolaryngology)     Subjective:     Review of Systems    12 point review of systems conducted, negative except as stated in the history of present illness. See HPI for details.    Objective:     Visit Vitals  /84 (BP Location: Right arm, Patient Position: Sitting)   Pulse 80   Temp 98 °F (36.7 °C)   Resp 16   Ht 6' (1.829 m)   Wt 96 kg (211 lb 9.6 oz)   SpO2 98%   BMI 28.70 kg/m²       Physical Exam  Vitals and nursing note reviewed.   Constitutional:       General: He is not in acute distress.     Appearance: He is not ill-appearing.   HENT:      Head: Normocephalic and atraumatic.      Mouth/Throat:      Mouth: Mucous membranes are moist.      Pharynx: Oropharynx is clear.   Eyes:      General: No scleral icterus.     Extraocular Movements: Extraocular movements intact.      Conjunctiva/sclera: Conjunctivae normal.      Pupils: Pupils are equal, round, and reactive to light.   Neck:      Vascular: No carotid bruit.   Cardiovascular:      Rate and Rhythm: Normal rate and regular rhythm.      Heart sounds: No murmur heard.     No friction rub. No gallop.   Pulmonary:      Effort: Pulmonary effort is normal. No respiratory distress.      Breath sounds: Normal breath sounds. No wheezing, rhonchi or rales.    Abdominal:      General: Abdomen is flat. Bowel sounds are normal. There is no distension.      Palpations: Abdomen is soft. There is no mass.      Tenderness: There is no abdominal tenderness.   Musculoskeletal:         General: Normal range of motion.      Cervical back: Normal range of motion and neck supple.   Skin:     General: Skin is warm and dry.   Neurological:      General: No focal deficit present.      Mental Status: He is alert.   Psychiatric:         Mood and Affect: Mood normal.         Assessment:       ICD-10-CM ICD-9-CM   1. Benign essential hypertension  I10 401.1        Plan:     1. Benign essential hypertension  Assessment & Plan:  Well controlled.   Continue Olmesartan 20mg / HCTZ 12.5mg + Amlodipine 10mg daily   Discussed vasodilatory effects of CCB - elevated legs, Na restriction, etc.  Low Sodium Diet (DASH Diet - Less than 2 grams of sodium per day).  Monitor blood pressure daily and log. Report consistent numbers greater than 140/90.  Maintain healthy weight with goal BMI <30. Exercise 30 minutes per day, 5 days per week.           Follow up if symptoms worsen or fail to improve. In addition to their scheduled follow up, the patient has also been instructed to follow up on as needed basis.     Future Appointments   Date Time Provider Department Center   9/18/2023  9:00 AM David Pool MD [facility] [id number]        ANABELLE Iglesias

## 2023-08-28 ENCOUNTER — NURSE TRIAGE (OUTPATIENT)
Dept: ADMINISTRATIVE | Facility: CLINIC | Age: 58
End: 2023-08-28
Payer: COMMERCIAL

## 2023-08-28 NOTE — TELEPHONE ENCOUNTER
Patient states c/o Anxiety and Memory Loss onset during April, 2021. Patient states onset of symptoms began post a Covid-19 immunization. Patient states history of use of anti-depression medication. Patient denies SI/HI and states symptoms of anxiety are increasing.     Care Advice given per Anxiety and Panic Attack (Adult) Guideline. Patient advised to See PCP within 3 days. Patient transferred to [facility] Scheduling and case encounter routed as High Priority to patient's PCP, Dr. David Pool for follow up. Patient states understanding of care advice.       Reason for Disposition   MODERATE anxiety (e.g., persistent or frequent anxiety symptoms; interferes with sleep, school, or work)    Additional Information   Negative: SEVERE difficulty breathing (e.g., struggling for each breath, speaks in single words)   Negative: Bluish (or gray) lips or face   Negative: Difficult to awaken or acting confused (e.g., disoriented, slurred speech)   Negative: Hysterical or combative behavior   Negative: Sounds like a life-threatening emergency to the triager   Negative: Chest pain   Negative: Palpitations, skipped heartbeat, or rapid heartbeat is main symptom   Negative: Cough is main symptom   Negative: Suicide thoughts, threats, attempts, or questions   Negative: Depression is main problem or symptom (e.g., feelings of sadness or hopelessness)   Negative: Difficulty breathing and persists > 10 minutes and not relieved by reassurance provided by triager   Negative: Lightheadedness or dizziness and persists > 10 minutes and not relieved by reassurance provided by triager   Negative: SEVERE anxiety (e.g., extremely anxious with intense emotional symptoms such as feeling of unreality, urge to flee, unable to calm down; unable to cope or function), which is not better after 10 minutes of reassurance and Care Advice   Negative: Panic attack symptoms (e.g., sudden onset of intense fear and symptoms such as dizziness, feeling  of impending doom or fear of dying, hyperventilation, numbness or tingling, sweating, trembling), and has not been evaluated for this by doctor (or NP/PA)   Negative: Panic attack symptoms (diagnosed in the past) that is not better with usual treatment, reassurance, or Care Advice   Negative: Alcohol or drug use, known or suspected, and feeling very shaky (i.e., visible tremors of hands)   Negative: Patient sounds very sick or weak to the triager   Negative: Patient sounds very upset or troubled to the triager    Protocols used: Anxiety and Panic Attack-A-[place]

## 2023-08-30 ENCOUNTER — OFFICE VISIT (OUTPATIENT)
Dept: INTERNAL MEDICINE | Facility: CLINIC | Age: 58
End: 2023-08-30
Payer: COMMERCIAL

## 2023-08-30 VITALS
BODY MASS INDEX: 28.04 KG/M2 | HEART RATE: 80 BPM | WEIGHT: 207 LBS | DIASTOLIC BLOOD PRESSURE: 86 MMHG | HEIGHT: 72 IN | RESPIRATION RATE: 16 BRPM | OXYGEN SATURATION: 98 % | TEMPERATURE: 97 F | SYSTOLIC BLOOD PRESSURE: 134 MMHG

## 2023-08-30 DIAGNOSIS — F41.9 ANXIETY: Primary | ICD-10-CM

## 2023-08-30 DIAGNOSIS — R45.86 MOOD DISTURBANCE: ICD-10-CM

## 2023-08-30 DIAGNOSIS — R41.3 OTHER AMNESIA: ICD-10-CM

## 2023-08-30 DIAGNOSIS — G47.9 SLEEP DISTURBANCE: ICD-10-CM

## 2023-08-30 PROCEDURE — 99214 OFFICE O/P EST MOD 30 MIN: CPT | Mod: ,,, | Performed by: INTERNAL MEDICINE

## 2023-08-30 PROCEDURE — 3066F PR DOCUMENTATION OF TREATMENT FOR NEPHROPATHY: ICD-10-PCS | Mod: CPTII,,, | Performed by: INTERNAL MEDICINE

## 2023-08-30 PROCEDURE — 3061F NEG MICROALBUMINURIA REV: CPT | Mod: CPTII,,, | Performed by: INTERNAL MEDICINE

## 2023-08-30 PROCEDURE — 3066F NEPHROPATHY DOC TX: CPT | Mod: CPTII,,, | Performed by: INTERNAL MEDICINE

## 2023-08-30 PROCEDURE — 1160F RVW MEDS BY RX/DR IN RCRD: CPT | Mod: CPTII,,, | Performed by: INTERNAL MEDICINE

## 2023-08-30 PROCEDURE — 1159F PR MEDICATION LIST DOCUMENTED IN MEDICAL RECORD: ICD-10-PCS | Mod: CPTII,,, | Performed by: INTERNAL MEDICINE

## 2023-08-30 PROCEDURE — 99214 PR OFFICE/OUTPT VISIT, EST, LEVL IV, 30-39 MIN: ICD-10-PCS | Mod: ,,, | Performed by: INTERNAL MEDICINE

## 2023-08-30 PROCEDURE — 1159F MED LIST DOCD IN RCRD: CPT | Mod: CPTII,,, | Performed by: INTERNAL MEDICINE

## 2023-08-30 PROCEDURE — 3061F PR NEG MICROALBUMINURIA RESULT DOCUMENTED/REVIEW: ICD-10-PCS | Mod: CPTII,,, | Performed by: INTERNAL MEDICINE

## 2023-08-30 PROCEDURE — 3044F PR MOST RECENT HEMOGLOBIN A1C LEVEL <7.0%: ICD-10-PCS | Mod: CPTII,,, | Performed by: INTERNAL MEDICINE

## 2023-08-30 PROCEDURE — 3044F HG A1C LEVEL LT 7.0%: CPT | Mod: CPTII,,, | Performed by: INTERNAL MEDICINE

## 2023-08-30 PROCEDURE — 1160F PR REVIEW ALL MEDS BY PRESCRIBER/CLIN PHARMACIST DOCUMENTED: ICD-10-PCS | Mod: CPTII,,, | Performed by: INTERNAL MEDICINE

## 2023-08-30 RX ORDER — BUPROPION HYDROCHLORIDE 150 MG/1
TABLET ORAL
Qty: 21 TABLET | Refills: 0 | Status: SHIPPED | OUTPATIENT
Start: 2023-08-30 | End: 2023-09-18

## 2023-08-30 NOTE — ASSESSMENT & PLAN NOTE
I am going to wean his Wellbutrin because I feel like that may be making him more anxious so we are going to do 150 mg for a week and then 150 mg every other day for a week and then every 3rd day for a week and then he can stop.  Continue current dose of Paxil  Continue trazodone 100 at bedtime  If at any time he is having issues he needs to call us  Need sleep study, neuro evaluation  He also complains of some occasional headaches and dizziness which is new we are going to go ahead and get a MRI.  No focal deficits noted on exam

## 2023-08-30 NOTE — PROGRESS NOTES
Subjective:      Patient ID: Walter Crawford is a 57 y.o. male.    Chief Complaint: No chief complaint on file.      HPI:  57-year-old male here today for anxiety  Frequent nighttime awakenings, not sleeping well  He states that he is anxious and fearful; scared to drive  He states he does not feel as confident at work but he has not had any work errors  He states that he is here today because his sisters even pointed out that his mood has changed   He denies any suicidal or homicidal ideations.  He states he is felt this way since he got his COVID vaccine in April of 2021  History of major depression on Paxil 40 and Wellbutrin 300    Past Medical History:  Past Medical History:   Diagnosis Date    ADHD (attention deficit hyperactivity disorder)     Benign essential hypertension 09/12/2022    Depression 09/12/2022    Hyperlipemia 09/12/2022    Microalbuminuria     Musculoskeletal pain     Obesity, unspecified     Personal history of colonic polyps 05/18/2021    Dr Christianne Reeder    Seronegative rheumatoid arthritis     Type 2 diabetes mellitus without complication, without long-term current use of insulin 09/12/2022    Unspecified cataract     Vitamin B12 deficiency      Past Surgical History:   Procedure Laterality Date    Arthroscopic repair medial meniscus  10/15/2019    CATARACT EXTRACTION Right 05/26/2020    CATARACT EXTRACTION Left 11/10/2020    CHOLECYSTECTOMY      COLONOSCOPY W/ POLYPECTOMY  05/18/2021    Dr Christianne Reeder     Review of patient's allergies indicates:  No Known Allergies  Current Outpatient Medications on File Prior to Visit   Medication Sig Dispense Refill    amLODIPine (NORVASC) 10 MG tablet Take 1 tablet (10 mg total) by mouth once daily. 90 tablet 3    atorvastatin (LIPITOR) 40 MG tablet Take 1 tablet (40 mg total) by mouth once daily. 90 tablet 3    blood sugar diagnostic Strp 1 each by Misc.(Non-Drug; Combo Route) route once daily. 100 each 3    leflunomide (ARAVA) 20 MG Tab Take 20 mg by  mouth once daily.      olmesartan-hydrochlorothiazide (BENICAR HCT) 20-12.5 mg per tablet Take 1 tablet by mouth once daily. 90 tablet 3    paroxetine (PAXIL) 40 MG tablet Take 1 tablet (40 mg total) by mouth once daily. 90 tablet 3    tirzepatide 7.5 mg/0.5 mL PnIj Inject 7.5 mg into the skin every 7 days. 4 pen 11    traZODone (DESYREL) 50 MG tablet Take 2 tablets (100 mg total) by mouth every evening. 60 tablet 5    [DISCONTINUED] buPROPion (WELLBUTRIN XL) 300 MG 24 hr tablet Take 1 tablet (300 mg total) by mouth once daily. 30 tablet 11    b complex vitamins (B COMPLEX-VITAMIN B12) tablet Take 1 tablet by mouth once daily. (Patient not taking: Reported on 8/30/2023)      [DISCONTINUED] pantoprazole (PROTONIX) 40 MG tablet Take 1 tablet (40 mg total) by mouth once daily. (Patient not taking: Reported on 8/30/2023) 30 tablet 5     No current facility-administered medications on file prior to visit.     Social History     Socioeconomic History    Marital status:    Tobacco Use    Smoking status: Every Day     Current packs/day: 1.00     Types: Cigarettes    Smokeless tobacco: Current    Tobacco comments:     Patient would like to quit smoking    Substance and Sexual Activity    Alcohol use: Yes     Comment: Nightly    Drug use: Never    Sexual activity: Yes     Partners: Female     Social Determinants of Health     Financial Resource Strain: Low Risk  (8/10/2023)    Overall Financial Resource Strain (CARDIA)     Difficulty of Paying Living Expenses: Not very hard   Food Insecurity: No Food Insecurity (8/10/2023)    Hunger Vital Sign     Worried About Running Out of Food in the Last Year: Never true     Ran Out of Food in the Last Year: Never true   Transportation Needs: No Transportation Needs (8/10/2023)    PRAPARE - Transportation     Lack of Transportation (Medical): No     Lack of Transportation (Non-Medical): No   Physical Activity: Insufficiently Active (8/10/2023)    Exercise Vital Sign     Days of  Exercise per Week: 3 days     Minutes of Exercise per Session: 30 min   Stress: No Stress Concern Present (8/10/2023)    [employer] - Occupational Stress Questionnaire     Feeling of Stress : Not at all   Social Connections: Unknown (8/10/2023)    Social Connection and Isolation Panel [NHANES]     Frequency of Communication with Friends and Family: Three times a week     Frequency of Social Gatherings with Friends and Family: Three times a week     Attends Mandaen Services: More than 4 times per year     Active Member of Clubs or Organizations: Yes     Attends Club or Organization Meetings: 1 to 4 times per year   Housing Stability: Unknown (8/10/2023)    Housing Stability Vital Sign     Unable to Pay for Housing in the Last Year: No     Unstable Housing in the Last Year: No     Family History   Problem Relation Age of Onset    Diabetes Mother     Hypertension Father     Dementia Father     Diabetes Sister     Depression Sister        Review of Systems  A comprehensive review of systems was performed and was negative with exception of what is documented above.     Objective:   There were no vitals taken for this visit.  Physical Exam  General : Alert and oriented, No acute distress, afebrile.  Eye : PERRLA. EOMI. Normal conjunctiva, Sclerae are nonicteric.   Musculoskeletal : Normal range of motion throughout. No muscle tenderness.  Integumentary : Warm, moist, intact.  Neurologic : Alert, Oriented  Psychiatric : Cooperative, Appropriate mood & affect.   Assessment/ Plan:   1. Anxiety  Assessment & Plan:  I am going to wean his Wellbutrin because I feel like that may be making him more anxious so we are going to do 150 mg for a week and then 150 mg every other day for a week and then every 3rd day for a week and then he can stop.  Continue current dose of Paxil  Continue trazodone 100 at bedtime  If at any time he is having issues he needs to call us  Need sleep study, neuro  evaluation  He also complains of some occasional headaches and dizziness which is new we are going to go ahead and get a MRI.  No focal deficits noted on exam      2. Other amnesia  -     Ambulatory referral/consult to Neurology; Future; Expected date: 09/06/2023  -     Ambulatory referral/consult to Psychiatry; Future; Expected date: 09/06/2023  -     MRI Brain W WO Contrast; Future; Expected date: 08/30/2023  -     Ambulatory referral/consult to Sleep Disorders; Future; Expected date: 09/06/2023  -     Testosterone; Future; Expected date: 08/30/2023  -     TSH; Future; Expected date: 08/30/2023  -     T4, Free; Future; Expected date: 08/30/2023    3. Mood disturbance  -     Ambulatory referral/consult to Neurology; Future; Expected date: 09/06/2023  -     Ambulatory referral/consult to Psychiatry; Future; Expected date: 09/06/2023  -     Ambulatory referral/consult to Sleep Disorders; Future; Expected date: 09/06/2023  -     Testosterone; Future; Expected date: 08/30/2023  -     TSH; Future; Expected date: 08/30/2023  -     T4, Free; Future; Expected date: 08/30/2023    4. Sleep disturbance  -     Ambulatory referral/consult to Neurology; Future; Expected date: 09/06/2023  -     Ambulatory referral/consult to Psychiatry; Future; Expected date: 09/06/2023  -     Ambulatory referral/consult to Sleep Disorders; Future; Expected date: 09/06/2023  -     Testosterone; Future; Expected date: 08/30/2023  -     TSH; Future; Expected date: 08/30/2023  -     T4, Free; Future; Expected date: 08/30/2023    Other orders  -     buPROPion (WELLBUTRIN XL) 150 MG TB24 tablet; One a day for a week, one every other day for a week, then every 3rd day for a week then stop  Dispense: 21 tablet; Refill: 0             Follow up in about 4 weeks (around 9/27/2023).

## 2023-09-01 RX ORDER — ALPRAZOLAM 0.25 MG/1
0.25 TABLET ORAL NIGHTLY PRN
Qty: 30 TABLET | Refills: 0 | Status: SHIPPED | OUTPATIENT
Start: 2023-09-01 | End: 2023-09-18

## 2023-09-01 NOTE — TELEPHONE ENCOUNTER
----- Message from David Pool MD sent at 8/31/2023  5:20 PM CDT -----  Thyroid and testosterone numbers look great please inquire with patient how he is feeling and see if he might benefit from a little low-dose Xanax until he feels better

## 2023-09-01 NOTE — TELEPHONE ENCOUNTER
Spoke to pt. Pt Verbally confirmed understanding. He stated that he would like to try the Xanax low dose. He would also like to know if he could keep the appointment with Dr. Herman on 9/18/23 for wellness and cancel the appointment with ANABELLE Mcnair on 9/24/23 for 4 week f/u.

## 2023-09-05 ENCOUNTER — TELEPHONE (OUTPATIENT)
Dept: INTERNAL MEDICINE | Facility: CLINIC | Age: 58
End: 2023-09-05
Payer: COMMERCIAL

## 2023-09-05 DIAGNOSIS — E55.9 VITAMIN D DEFICIENCY: ICD-10-CM

## 2023-09-05 DIAGNOSIS — Z12.5 SCREENING PSA (PROSTATE SPECIFIC ANTIGEN): ICD-10-CM

## 2023-09-05 DIAGNOSIS — Z00.00 WELLNESS EXAMINATION: Primary | ICD-10-CM

## 2023-09-05 NOTE — TELEPHONE ENCOUNTER
----- Message from Milind Stallings MA sent at 9/5/2023  1:31 PM CDT -----  Regarding: PV 9/18/23 @ 9:00 Dr. Herman  1. Are there any outstanding tasks in the patient's chart? Yes, fasting labs    2. Is there any documentation in the chart? No    3.Has patient been seen in an ER, Urgent care clinic, or been admitted since last visit?  If yes, When, where, and why    4. Has patient seen any other healthcare providers since last visit?  If yes, when, where, and why    5. Has patient had any bloodwork or XR done since last visit?    6. Is patient signed up for patient portal?

## 2023-09-12 ENCOUNTER — HOSPITAL ENCOUNTER (OUTPATIENT)
Dept: RADIOLOGY | Facility: HOSPITAL | Age: 58
Discharge: HOME | End: 2023-09-12
Attending: INTERNAL MEDICINE
Payer: COMMERCIAL

## 2023-09-12 DIAGNOSIS — R41.3 OTHER AMNESIA: ICD-10-CM

## 2023-09-12 PROCEDURE — 70553 MRI BRAIN STEM W/O & W/DYE: CPT | Mod: TC

## 2023-09-12 PROCEDURE — 25500020 PHARM REV CODE 255: Performed by: INTERNAL MEDICINE

## 2023-09-12 PROCEDURE — A9577 INJ MULTIHANCE: HCPCS | Performed by: INTERNAL MEDICINE

## 2023-09-12 RX ADMIN — GADOBENATE DIMEGLUMINE 20 ML: 529 INJECTION, SOLUTION INTRAVENOUS at 10:09

## 2023-09-12 NOTE — PROGRESS NOTES
MRI of the brain without any acute abnormalities some evidence of chronic very mild microvascular change patient needs tight control of blood pressure as well as cholesterol  Add aspirin 81 mg daily

## 2023-09-18 ENCOUNTER — OFFICE VISIT (OUTPATIENT)
Dept: INTERNAL MEDICINE | Facility: CLINIC | Age: 58
End: 2023-09-18
Payer: COMMERCIAL

## 2023-09-18 VITALS
HEIGHT: 72 IN | OXYGEN SATURATION: 97 % | BODY MASS INDEX: 28.71 KG/M2 | SYSTOLIC BLOOD PRESSURE: 136 MMHG | WEIGHT: 212 LBS | RESPIRATION RATE: 16 BRPM | HEART RATE: 79 BPM | TEMPERATURE: 98 F | DIASTOLIC BLOOD PRESSURE: 88 MMHG

## 2023-09-18 DIAGNOSIS — F22 PARANOIA: ICD-10-CM

## 2023-09-18 DIAGNOSIS — F32.A DEPRESSION, UNSPECIFIED DEPRESSION TYPE: ICD-10-CM

## 2023-09-18 DIAGNOSIS — R41.89 COGNITIVE IMPAIRMENT: ICD-10-CM

## 2023-09-18 DIAGNOSIS — F41.9 ANXIETY: Primary | ICD-10-CM

## 2023-09-18 PROCEDURE — 99214 OFFICE O/P EST MOD 30 MIN: CPT | Mod: ,,, | Performed by: INTERNAL MEDICINE

## 2023-09-18 PROCEDURE — 3008F PR BODY MASS INDEX (BMI) DOCUMENTED: ICD-10-PCS | Mod: CPTII,,, | Performed by: INTERNAL MEDICINE

## 2023-09-18 PROCEDURE — 3079F DIAST BP 80-89 MM HG: CPT | Mod: CPTII,,, | Performed by: INTERNAL MEDICINE

## 2023-09-18 PROCEDURE — 1160F RVW MEDS BY RX/DR IN RCRD: CPT | Mod: CPTII,,, | Performed by: INTERNAL MEDICINE

## 2023-09-18 PROCEDURE — 3044F PR MOST RECENT HEMOGLOBIN A1C LEVEL <7.0%: ICD-10-PCS | Mod: CPTII,,, | Performed by: INTERNAL MEDICINE

## 2023-09-18 PROCEDURE — 1159F MED LIST DOCD IN RCRD: CPT | Mod: CPTII,,, | Performed by: INTERNAL MEDICINE

## 2023-09-18 PROCEDURE — 3079F PR MOST RECENT DIASTOLIC BLOOD PRESSURE 80-89 MM HG: ICD-10-PCS | Mod: CPTII,,, | Performed by: INTERNAL MEDICINE

## 2023-09-18 PROCEDURE — 99214 PR OFFICE/OUTPT VISIT, EST, LEVL IV, 30-39 MIN: ICD-10-PCS | Mod: ,,, | Performed by: INTERNAL MEDICINE

## 2023-09-18 PROCEDURE — 3061F PR NEG MICROALBUMINURIA RESULT DOCUMENTED/REVIEW: ICD-10-PCS | Mod: CPTII,,, | Performed by: INTERNAL MEDICINE

## 2023-09-18 PROCEDURE — 3075F SYST BP GE 130 - 139MM HG: CPT | Mod: CPTII,,, | Performed by: INTERNAL MEDICINE

## 2023-09-18 PROCEDURE — 3066F NEPHROPATHY DOC TX: CPT | Mod: CPTII,,, | Performed by: INTERNAL MEDICINE

## 2023-09-18 PROCEDURE — 3008F BODY MASS INDEX DOCD: CPT | Mod: CPTII,,, | Performed by: INTERNAL MEDICINE

## 2023-09-18 PROCEDURE — 1160F PR REVIEW ALL MEDS BY PRESCRIBER/CLIN PHARMACIST DOCUMENTED: ICD-10-PCS | Mod: CPTII,,, | Performed by: INTERNAL MEDICINE

## 2023-09-18 PROCEDURE — 3066F PR DOCUMENTATION OF TREATMENT FOR NEPHROPATHY: ICD-10-PCS | Mod: CPTII,,, | Performed by: INTERNAL MEDICINE

## 2023-09-18 PROCEDURE — 3044F HG A1C LEVEL LT 7.0%: CPT | Mod: CPTII,,, | Performed by: INTERNAL MEDICINE

## 2023-09-18 PROCEDURE — 3075F PR MOST RECENT SYSTOLIC BLOOD PRESS GE 130-139MM HG: ICD-10-PCS | Mod: CPTII,,, | Performed by: INTERNAL MEDICINE

## 2023-09-18 PROCEDURE — 3061F NEG MICROALBUMINURIA REV: CPT | Mod: CPTII,,, | Performed by: INTERNAL MEDICINE

## 2023-09-18 PROCEDURE — 1159F PR MEDICATION LIST DOCUMENTED IN MEDICAL RECORD: ICD-10-PCS | Mod: CPTII,,, | Performed by: INTERNAL MEDICINE

## 2023-09-18 RX ORDER — BUSPIRONE HYDROCHLORIDE 5 MG/1
5 TABLET ORAL 2 TIMES DAILY
Qty: 60 TABLET | Refills: 11 | Status: SHIPPED | OUTPATIENT
Start: 2023-09-18 | End: 2023-09-18 | Stop reason: SDUPTHER

## 2023-09-18 RX ORDER — PAROXETINE HYDROCHLORIDE 40 MG/1
40 TABLET, FILM COATED ORAL DAILY
Qty: 30 TABLET | Refills: 0 | Status: SHIPPED | OUTPATIENT
Start: 2023-09-18 | End: 2023-10-11

## 2023-09-18 RX ORDER — BUSPIRONE HYDROCHLORIDE 5 MG/1
5 TABLET ORAL 2 TIMES DAILY PRN
Qty: 60 TABLET | Refills: 11 | Status: SHIPPED | OUTPATIENT
Start: 2023-09-18 | End: 2023-10-11

## 2023-09-18 RX ORDER — CARIPRAZINE 1.5 MG/1
1.5 CAPSULE, GELATIN COATED ORAL DAILY
Qty: 30 CAPSULE | Refills: 6 | Status: SHIPPED | OUTPATIENT
Start: 2023-09-18 | End: 2023-10-11

## 2023-09-18 RX ORDER — PAROXETINE HYDROCHLORIDE 40 MG/1
60 TABLET, FILM COATED ORAL DAILY
Qty: 45 TABLET | Refills: 6 | Status: SHIPPED | OUTPATIENT
Start: 2023-09-18 | End: 2023-09-18 | Stop reason: SDUPTHER

## 2023-09-18 NOTE — ASSESSMENT & PLAN NOTE
Patient apparently did not follow the weaning instructions of Wellbutrin as I had advised he just took the ball of the medication and did not follow any of the directions..  He denies feeling any better or any worse on versus off the Wellbutrin.  We sent a referral to Psychiatry, he has not heard from anyone and he still is unsure of the date of his neurology visit.  MRI of the brain was unrevealing.  The clearly patient is having difficulty focusing and finishing task.  Cont Paxil 40  Add Vraylar 1.5  RTC 6 weeks  Check on status of referrals

## 2023-09-18 NOTE — PROGRESS NOTES
Subjective:      Patient ID: Walter Crawford is a 57 y.o. male.    Chief Complaint: Follow-up (4 week F/U)      HPI:    57-year-old male here today for 4 week follow up on anxiety  Frequent nighttime awakenings, not sleeping well  He states that he is anxious and fearful; scared to drive  He states he does not feel as confident at work but he has not had any work errors  He denies any suicidal or homicidal ideations.  He states he is felt this way since he got his COVID vaccine in April of 2021  History of major depression on Paxil 40 and Wellbutrin 300  He is blah, he is unmotivated  He is scared; he's always been such a confident person.   I cant remember and I have no confidence  At the time of his last visit we had advised him to wean Wellbutrin we sent in 150 mg with instructions on how to taper off he picked up the prescription but did not taper he just simply took it until the pills finished.  MRI of the brain with evidence of mild microvascular change; no other findings  Patient does not yet have appointments to Neurology and Psychiatry.  The neurology office states that they called him several times but he does not remember getting any phone calls.      Past Medical History:  Past Medical History:   Diagnosis Date    ADHD (attention deficit hyperactivity disorder)     Benign essential hypertension 09/12/2022    Depression 09/12/2022    Hyperlipemia 09/12/2022    Microalbuminuria     Musculoskeletal pain     Obesity, unspecified     Personal history of colonic polyps 05/18/2021    Dr Christianne Reeder    Seronegative rheumatoid arthritis     Type 2 diabetes mellitus without complication, without long-term current use of insulin 09/12/2022    Unspecified cataract     Vitamin B12 deficiency      Past Surgical History:   Procedure Laterality Date    Arthroscopic repair medial meniscus  10/15/2019    CATARACT EXTRACTION Right 05/26/2020    CATARACT EXTRACTION Left 11/10/2020    CHOLECYSTECTOMY      COLONOSCOPY W/  POLYPECTOMY  05/18/2021    Dr Christianne Reeder     Review of patient's allergies indicates:  No Known Allergies  Current Outpatient Medications on File Prior to Visit   Medication Sig Dispense Refill    amLODIPine (NORVASC) 10 MG tablet Take 1 tablet (10 mg total) by mouth once daily. 90 tablet 3    atorvastatin (LIPITOR) 40 MG tablet Take 1 tablet (40 mg total) by mouth once daily. 90 tablet 3    blood sugar diagnostic Strp 1 each by Misc.(Non-Drug; Combo Route) route once daily. 100 each 3    leflunomide (ARAVA) 20 MG Tab Take 20 mg by mouth once daily.      olmesartan-hydrochlorothiazide (BENICAR HCT) 20-12.5 mg per tablet Take 1 tablet by mouth once daily. 90 tablet 3    tirzepatide 7.5 mg/0.5 mL PnIj Inject 7.5 mg into the skin every 7 days. 4 pen 11    traZODone (DESYREL) 50 MG tablet Take 2 tablets (100 mg total) by mouth every evening. 60 tablet 5    [DISCONTINUED] ALPRAZolam (XANAX) 0.25 MG tablet Take 1 tablet (0.25 mg total) by mouth nightly as needed for Anxiety. 30 tablet 0    [DISCONTINUED] buPROPion (WELLBUTRIN XL) 150 MG TB24 tablet One a day for a week, one every other day for a week, then every 3rd day for a week then stop 21 tablet 0    [DISCONTINUED] paroxetine (PAXIL) 40 MG tablet Take 1 tablet (40 mg total) by mouth once daily. 90 tablet 3    [DISCONTINUED] b complex vitamins (B COMPLEX-VITAMIN B12) tablet Take 1 tablet by mouth once daily. (Patient not taking: Reported on 8/30/2023)       No current facility-administered medications on file prior to visit.     Social History     Socioeconomic History    Marital status:    Tobacco Use    Smoking status: Every Day     Current packs/day: 1.00     Types: Cigarettes    Smokeless tobacco: Current    Tobacco comments:     Patient would like to quit smoking    Substance and Sexual Activity    Alcohol use: Yes     Comment: Nightly    Drug use: Never    Sexual activity: Yes     Partners: Female     Social Determinants of Health     Financial Resource  Strain: Low Risk  (8/10/2023)    Overall Financial Resource Strain (CARDIA)     Difficulty of Paying Living Expenses: Not very hard   Food Insecurity: No Food Insecurity (8/10/2023)    Hunger Vital Sign     Worried About Running Out of Food in the Last Year: Never true     Ran Out of Food in the Last Year: Never true   Transportation Needs: No Transportation Needs (8/10/2023)    PRAPARE - Transportation     Lack of Transportation (Medical): No     Lack of Transportation (Non-Medical): No   Physical Activity: Insufficiently Active (8/10/2023)    Exercise Vital Sign     Days of Exercise per Week: 3 days     Minutes of Exercise per Session: 30 min   Stress: No Stress Concern Present (8/10/2023)    [employer] [employer] - Occupational Stress Questionnaire     Feeling of Stress : Not at all   Social Connections: Unknown (8/10/2023)    Social Connection and Isolation Panel [NHANES]     Frequency of Communication with Friends and Family: Three times a week     Frequency of Social Gatherings with Friends and Family: Three times a week     Attends Jainism Services: More than 4 times per year     Active Member of Clubs or Organizations: Yes     Attends Club or Organization Meetings: 1 to 4 times per year   Housing Stability: Unknown (8/10/2023)    Housing Stability Vital Sign     Unable to Pay for Housing in the Last Year: No     Unstable Housing in the Last Year: No     Family History   Problem Relation Age of Onset    Diabetes Mother     Hypertension Father     Dementia Father     Diabetes Sister     Depression Sister        Review of Systems  A comprehensive review of systems was performed and was negative with exception of what is documented above.     Objective:   /88 (BP Location: Left arm, Patient Position: Sitting, BP Method: Medium (Manual))   Pulse 79   Temp 98.3 °F (36.8 °C) (Temporal)   Resp 16   Ht 6' (1.829 m)   Wt 96.2 kg (212 lb)   SpO2 97%   BMI 28.75 kg/m²   Physical  Exam  General : Alert and oriented, No acute distress, afebrile.  Eye : PERRLA. EOMI. Normal conjunctiva, Sclerae are nonicteric.   Musculoskeletal : Normal range of motion throughout. No muscle tenderness.  Integumentary : Warm, moist, intact.  Neurologic : Alert, Oriented  Psychiatric : Cooperative, Flat affect, paranoia  Assessment/ Plan:   1. Anxiety  Assessment & Plan:  Patient apparently did not follow the weaning instructions of Wellbutrin as I had advised he just took the ball of the medication and did not follow any of the directions..  He denies feeling any better or any worse on versus off the Wellbutrin.  We sent a referral to Psychiatry, he has not heard from anyone and he still is unsure of the date of his neurology visit.  MRI of the brain was unrevealing.  The clearly patient is having difficulty focusing and finishing task.  Cont Paxil 40  Add Vraylar 1.5  RTC 6 weeks  Check on status of referrals    Orders:  -     Cancel: Ambulatory referral/consult to Psychiatry; Future; Expected date: 09/25/2023  -     Ambulatory referral/consult to Psychiatry; Future; Expected date: 09/25/2023    2. Depression, unspecified depression type  -     Discontinue: paroxetine (PAXIL) 40 MG tablet; Take 1.5 tablets (60 mg total) by mouth once daily.  Dispense: 45 tablet; Refill: 6  -     Cancel: Ambulatory referral/consult to Psychiatry; Future; Expected date: 09/25/2023  -     paroxetine (PAXIL) 40 MG tablet; Take 1 tablet (40 mg total) by mouth once daily.  Dispense: 30 tablet; Refill: 0  -     Ambulatory referral/consult to Psychiatry; Future; Expected date: 09/25/2023    3. Paranoia    4. Cognitive impairment    Other orders  -     Discontinue: busPIRone (BUSPAR) 5 MG Tab; Take 1 tablet (5 mg total) by mouth 2 (two) times daily.  Dispense: 60 tablet; Refill: 11  -     cariprazine (VRAYLAR) 1.5 mg Cap; Take 1 capsule (1.5 mg total) by mouth Daily.  Dispense: 30 capsule; Refill: 6  -     busPIRone (BUSPAR) 5 MG Tab;  Take 1 tablet (5 mg total) by mouth 2 (two) times daily as needed (anxiety).  Dispense: 60 tablet; Refill: 11             Follow up in about 6 weeks (around 10/30/2023).

## 2023-09-20 ENCOUNTER — TELEPHONE (OUTPATIENT)
Dept: INTERNAL MEDICINE | Facility: CLINIC | Age: 58
End: 2023-09-20
Payer: COMMERCIAL

## 2023-09-20 NOTE — TELEPHONE ENCOUNTER
----- Message from Jillian Sheikh sent at 9/20/2023  8:07 AM CDT -----  Regarding: call back  .Type:  Needs Medical Advice    Who Called: [facility]  Would the patient rather a call back or a response via [facility]?   Best Call Back Number: [phone]  Additional Information: dionne is requesting a call back regarding a referral that was sent she also needs his most recent labs the fax is [phone]

## 2023-10-10 ENCOUNTER — TELEPHONE (OUTPATIENT)
Dept: INTERNAL MEDICINE | Facility: CLINIC | Age: 58
End: 2023-10-10
Payer: COMMERCIAL

## 2023-10-11 ENCOUNTER — OFFICE VISIT (OUTPATIENT)
Dept: INTERNAL MEDICINE | Facility: CLINIC | Age: 58
End: 2023-10-11
Payer: COMMERCIAL

## 2023-10-11 VITALS
BODY MASS INDEX: 28.85 KG/M2 | DIASTOLIC BLOOD PRESSURE: 80 MMHG | HEART RATE: 80 BPM | RESPIRATION RATE: 16 BRPM | WEIGHT: 213 LBS | OXYGEN SATURATION: 99 % | TEMPERATURE: 98 F | HEIGHT: 72 IN | SYSTOLIC BLOOD PRESSURE: 130 MMHG

## 2023-10-11 DIAGNOSIS — F32.A DEPRESSION, UNSPECIFIED DEPRESSION TYPE: ICD-10-CM

## 2023-10-11 DIAGNOSIS — E11.9 TYPE 2 DIABETES MELLITUS WITHOUT COMPLICATION, WITHOUT LONG-TERM CURRENT USE OF INSULIN: Primary | ICD-10-CM

## 2023-10-11 PROCEDURE — 1159F PR MEDICATION LIST DOCUMENTED IN MEDICAL RECORD: ICD-10-PCS | Mod: CPTII,,, | Performed by: NURSE PRACTITIONER

## 2023-10-11 PROCEDURE — 3044F PR MOST RECENT HEMOGLOBIN A1C LEVEL <7.0%: ICD-10-PCS | Mod: CPTII,,, | Performed by: NURSE PRACTITIONER

## 2023-10-11 PROCEDURE — 1160F PR REVIEW ALL MEDS BY PRESCRIBER/CLIN PHARMACIST DOCUMENTED: ICD-10-PCS | Mod: CPTII,,, | Performed by: NURSE PRACTITIONER

## 2023-10-11 PROCEDURE — 1160F RVW MEDS BY RX/DR IN RCRD: CPT | Mod: CPTII,,, | Performed by: NURSE PRACTITIONER

## 2023-10-11 PROCEDURE — 3066F PR DOCUMENTATION OF TREATMENT FOR NEPHROPATHY: ICD-10-PCS | Mod: CPTII,,, | Performed by: NURSE PRACTITIONER

## 2023-10-11 PROCEDURE — 99214 PR OFFICE/OUTPT VISIT, EST, LEVL IV, 30-39 MIN: ICD-10-PCS | Mod: ,,, | Performed by: NURSE PRACTITIONER

## 2023-10-11 PROCEDURE — 1159F MED LIST DOCD IN RCRD: CPT | Mod: CPTII,,, | Performed by: NURSE PRACTITIONER

## 2023-10-11 PROCEDURE — 3066F NEPHROPATHY DOC TX: CPT | Mod: CPTII,,, | Performed by: NURSE PRACTITIONER

## 2023-10-11 PROCEDURE — 3008F BODY MASS INDEX DOCD: CPT | Mod: CPTII,,, | Performed by: NURSE PRACTITIONER

## 2023-10-11 PROCEDURE — 3075F PR MOST RECENT SYSTOLIC BLOOD PRESS GE 130-139MM HG: ICD-10-PCS | Mod: CPTII,,, | Performed by: NURSE PRACTITIONER

## 2023-10-11 PROCEDURE — 3075F SYST BP GE 130 - 139MM HG: CPT | Mod: CPTII,,, | Performed by: NURSE PRACTITIONER

## 2023-10-11 PROCEDURE — 99214 OFFICE O/P EST MOD 30 MIN: CPT | Mod: ,,, | Performed by: NURSE PRACTITIONER

## 2023-10-11 PROCEDURE — 3079F DIAST BP 80-89 MM HG: CPT | Mod: CPTII,,, | Performed by: NURSE PRACTITIONER

## 2023-10-11 PROCEDURE — 3044F HG A1C LEVEL LT 7.0%: CPT | Mod: CPTII,,, | Performed by: NURSE PRACTITIONER

## 2023-10-11 PROCEDURE — 3008F PR BODY MASS INDEX (BMI) DOCUMENTED: ICD-10-PCS | Mod: CPTII,,, | Performed by: NURSE PRACTITIONER

## 2023-10-11 PROCEDURE — 3079F PR MOST RECENT DIASTOLIC BLOOD PRESSURE 80-89 MM HG: ICD-10-PCS | Mod: CPTII,,, | Performed by: NURSE PRACTITIONER

## 2023-10-11 PROCEDURE — 3061F NEG MICROALBUMINURIA REV: CPT | Mod: CPTII,,, | Performed by: NURSE PRACTITIONER

## 2023-10-11 PROCEDURE — 3061F PR NEG MICROALBUMINURIA RESULT DOCUMENTED/REVIEW: ICD-10-PCS | Mod: CPTII,,, | Performed by: NURSE PRACTITIONER

## 2023-10-11 RX ORDER — METFORMIN HYDROCHLORIDE 500 MG/1
500 TABLET ORAL
Qty: 60 TABLET | Refills: 11 | Status: SHIPPED | OUTPATIENT
Start: 2023-10-11 | End: 2024-03-05

## 2023-10-11 RX ORDER — PAROXETINE HYDROCHLORIDE 40 MG/1
40 TABLET, FILM COATED ORAL DAILY
Qty: 30 TABLET | Refills: 0
Start: 2023-10-11 | End: 2024-03-05

## 2023-10-11 NOTE — ASSESSMENT & PLAN NOTE
Self discontinued Vraylar and Buspar. Would like to stay on paroxetine and trazodone.  Feels that he can control his mood with dietary modifications and exercise. We discussed importance of reducing alcohol intake.   Educated patient on the risks of serotonin based medications such as serotonin modulators and SSRIs/SNRIs including common side effects of nausea, GI upset, headache dizziness as well as the rare risk for worsening symptoms of depression including development of suicidal thoughts or ideations, and serotonin syndrome.   Discussed benefits of medication not becoming noticeable until up to 6 weeks from start date.   Exercise daily. Get sunlight daily.  Practice positive phrases and repeat throughout the day, along with yoga and relaxation techniques.  Establish good social support, make changes to reduce stress.  Reports any symptoms of suicidal/homicidal ideations or self harm immediately, if clinic is closed go to nearest emergency room.  RTC in 1 month for follow up and PRN.

## 2023-10-11 NOTE — PROGRESS NOTES
Internal Medicine    Patient ID: [id number]     Chief Complaint: Anxiety (6 weeks f/u )    HPI:     Walter Crawford is a 57 y.o. male here today for a follow up. Referred to Neuro and Psych at last visit. Has appt lined up with Neurology next week. Saw Psych but he states he is self discontinuing all psychotropic medications and does not wish to follow up. Feels that he can control his mood by dietary modifications and increasing exercise. He is also self discontinuing Mounjaro as he feels they are exacerbating GI issues. Would like to get back onto Metformin. Still drinking abut 6 beers per day. We discussed at length the negative effects of alcohol abuse and smoking over time including cerebral atrophy, gastritis/ulceration, cirrhosis, etc.  Started on Vraylar at 1.5mg and Paxil increased. No other complaints today.     Past Medical History:   Diagnosis Date    ADHD (attention deficit hyperactivity disorder)     Benign essential hypertension 09/12/2022    Depression 09/12/2022    Hyperlipemia 09/12/2022    Microalbuminuria     Musculoskeletal pain     Obesity, unspecified     Personal history of colonic polyps 05/18/2021    Dr Christianne Reeder    Seronegative rheumatoid arthritis     Type 2 diabetes mellitus without complication, without long-term current use of insulin 09/12/2022    Unspecified cataract     Vitamin B12 deficiency         Past Surgical History:   Procedure Laterality Date    Arthroscopic repair medial meniscus  10/15/2019    CATARACT EXTRACTION Right 05/26/2020    CATARACT EXTRACTION Left 11/10/2020    CHOLECYSTECTOMY      COLONOSCOPY W/ POLYPECTOMY  05/18/2021    Dr Christianne Reeder        Social History     Tobacco Use    Smoking status: Every Day     Current packs/day: 1.00     Types: Cigarettes    Smokeless tobacco: Current    Tobacco comments:     Patient would like to quit smoking    Substance and Sexual Activity    Alcohol use: Yes     Alcohol/week: 42.0 standard drinks of alcohol     Types: 42 Cans  of beer per week     Comment: Nightly    Drug use: Never    Sexual activity: Yes     Partners: Female        Current Outpatient Medications   Medication Instructions    amLODIPine (NORVASC) 10 mg, Oral, Daily    atorvastatin (LIPITOR) 40 mg, Oral, Daily    blood sugar diagnostic Strp 1 each, Misc.(Non-Drug; Combo Route), Daily    leflunomide (ARAVA) 20 mg, Oral, Daily    metFORMIN (GLUCOPHAGE) 500 mg, Oral, With breakfast    olmesartan-hydrochlorothiazide (BENICAR HCT) 20-12.5 mg per tablet 1 tablet, Oral, Daily    paroxetine (PAXIL) 40 mg, Oral, Daily    traZODone (DESYREL) 100 mg, Oral, Nightly       Review of patient's allergies indicates:  No Known Allergies     Patient Care Team:  David Pool MD as PCP - General (Internal Medicine)  Tavia Helton MD as Consulting Physician (Rheumatology)  Jose Willard MD (Dermatology)  Ron Oliveros DO as Consulting Physician (Cardiology)  Christianne Reeder III, MD as Consulting Physician (Gastroenterology)  Deniz Platt II, MD (Orthopedic Surgery)  Vj Feliz Jr., MD as Consulting Physician (Otolaryngology)     Subjective:     Review of Systems    12 point review of systems conducted, negative except as stated in the history of present illness. See HPI for details.    Objective:     Visit Vitals  /80 (BP Location: Right arm, Patient Position: Sitting, BP Method: Medium (Manual))   Pulse 80   Temp 98 °F (36.7 °C)   Resp 16   Ht 6' (1.829 m)   Wt 96.6 kg (213 lb)   SpO2 99%   BMI 28.89 kg/m²       Physical Exam  Vitals and nursing note reviewed.   Constitutional:       General: He is not in acute distress.     Appearance: He is not ill-appearing.   HENT:      Head: Normocephalic and atraumatic.      Mouth/Throat:      Mouth: Mucous membranes are moist.      Pharynx: Oropharynx is clear.   Eyes:      General: No scleral icterus.     Extraocular Movements: Extraocular movements intact.      Conjunctiva/sclera: Conjunctivae normal.      Pupils:  Pupils are equal, round, and reactive to light.   Neck:      Vascular: No carotid bruit.   Cardiovascular:      Rate and Rhythm: Normal rate and regular rhythm.      Heart sounds: No murmur heard.     No friction rub. No gallop.   Pulmonary:      Effort: Pulmonary effort is normal. No respiratory distress.      Breath sounds: Normal breath sounds. No wheezing, rhonchi or rales.   Abdominal:      General: Abdomen is flat. Bowel sounds are normal. There is no distension.      Palpations: Abdomen is soft. There is no mass.      Tenderness: There is no abdominal tenderness.   Musculoskeletal:         General: Normal range of motion.      Cervical back: Normal range of motion and neck supple.   Skin:     General: Skin is warm and dry.   Neurological:      General: No focal deficit present.      Mental Status: He is alert.   Psychiatric:         Mood and Affect: Mood normal.         Assessment:       ICD-10-CM ICD-9-CM   1. Type 2 diabetes mellitus without complication, without long-term current use of insulin  E11.9 250.00   2. Depression, unspecified depression type  F32.A 311        Plan:     1. Type 2 diabetes mellitus without complication, without long-term current use of insulin  Assessment & Plan:  Lab Results   Component Value Date    HGBA1C 5.5 06/19/2023    HGBA1C 6.0 03/17/2023    .00 06/19/2023    CREATININE 0.84 06/19/2023      Self discontinued mounjaro. Resume metformin 500mg BID  Follow ADA Diet. Avoid soda, simple sweets, and limit rice/pasta/breads/starches (no more than 45-50 grams per meal).  Maintain healthy weight with goal BMI <30.  Exercise 5 times per week for 30 minutes per day.  Stressed importance of daily foot exams.  Stressed importance of annual dilated eye exam.            2. Depression, unspecified depression type  Assessment & Plan:  Self discontinued Vraylar and Buspar. Would like to stay on paroxetine and trazodone.  Feels that he can control his mood with dietary modifications  and exercise. We discussed importance of reducing alcohol intake.   Educated patient on the risks of serotonin based medications such as serotonin modulators and SSRIs/SNRIs including common side effects of nausea, GI upset, headache dizziness as well as the rare risk for worsening symptoms of depression including development of suicidal thoughts or ideations, and serotonin syndrome.   Discussed benefits of medication not becoming noticeable until up to 6 weeks from start date.   Exercise daily. Get sunlight daily.  Practice positive phrases and repeat throughout the day, along with yoga and relaxation techniques.  Establish good social support, make changes to reduce stress.  Reports any symptoms of suicidal/homicidal ideations or self harm immediately, if clinic is closed go to nearest emergency room.  RTC in 1 month for follow up and PRN.         Orders:  -     paroxetine (PAXIL) 40 MG tablet; Take 1 tablet (40 mg total) by mouth once daily.  Dispense: 30 tablet; Refill: 0    Other orders  -     metFORMIN (GLUCOPHAGE) 500 MG tablet; Take 1 tablet (500 mg total) by mouth daily with breakfast.  Dispense: 60 tablet; Refill: 11         In addition to their scheduled follow up, the patient has also been instructed to follow up on as needed basis.     Future Appointments   Date Time Provider Department Center   10/16/2023 11:00 AM Kamron Jasmine AGACNP-BC [facility] [address]   10/30/2023  2:20 PM David Pool MD [facility] [id number]        ANABELLE Iglesias

## 2023-10-13 ENCOUNTER — PATIENT MESSAGE (OUTPATIENT)
Dept: NEUROLOGY | Facility: CLINIC | Age: 58
End: 2023-10-13
Payer: COMMERCIAL

## 2023-10-30 ENCOUNTER — OFFICE VISIT (OUTPATIENT)
Dept: INTERNAL MEDICINE | Facility: CLINIC | Age: 58
End: 2023-10-30
Payer: COMMERCIAL

## 2023-10-30 DIAGNOSIS — Z23 NEED FOR VACCINATION: Primary | ICD-10-CM

## 2023-10-30 DIAGNOSIS — R45.86 MOOD DISTURBANCE: ICD-10-CM

## 2023-10-30 DIAGNOSIS — E11.9 TYPE 2 DIABETES MELLITUS WITHOUT COMPLICATION, WITHOUT LONG-TERM CURRENT USE OF INSULIN: ICD-10-CM

## 2023-10-30 PROCEDURE — 90686 FLU VACCINE (QUAD) GREATER THAN OR EQUAL TO 3YO PRESERVATIVE FREE IM: ICD-10-PCS | Mod: ,,, | Performed by: INTERNAL MEDICINE

## 2023-10-30 PROCEDURE — 1160F RVW MEDS BY RX/DR IN RCRD: CPT | Mod: CPTII,,, | Performed by: INTERNAL MEDICINE

## 2023-10-30 PROCEDURE — 99213 PR OFFICE/OUTPT VISIT, EST, LEVL III, 20-29 MIN: ICD-10-PCS | Mod: 25,,, | Performed by: INTERNAL MEDICINE

## 2023-10-30 PROCEDURE — 1160F PR REVIEW ALL MEDS BY PRESCRIBER/CLIN PHARMACIST DOCUMENTED: ICD-10-PCS | Mod: CPTII,,, | Performed by: INTERNAL MEDICINE

## 2023-10-30 PROCEDURE — 3066F PR DOCUMENTATION OF TREATMENT FOR NEPHROPATHY: ICD-10-PCS | Mod: CPTII,,, | Performed by: INTERNAL MEDICINE

## 2023-10-30 PROCEDURE — 3061F NEG MICROALBUMINURIA REV: CPT | Mod: CPTII,,, | Performed by: INTERNAL MEDICINE

## 2023-10-30 PROCEDURE — 3061F PR NEG MICROALBUMINURIA RESULT DOCUMENTED/REVIEW: ICD-10-PCS | Mod: CPTII,,, | Performed by: INTERNAL MEDICINE

## 2023-10-30 PROCEDURE — 3044F HG A1C LEVEL LT 7.0%: CPT | Mod: CPTII,,, | Performed by: INTERNAL MEDICINE

## 2023-10-30 PROCEDURE — 1159F MED LIST DOCD IN RCRD: CPT | Mod: CPTII,,, | Performed by: INTERNAL MEDICINE

## 2023-10-30 PROCEDURE — 90471 IMMUNIZATION ADMIN: CPT | Mod: ,,, | Performed by: INTERNAL MEDICINE

## 2023-10-30 PROCEDURE — 3044F PR MOST RECENT HEMOGLOBIN A1C LEVEL <7.0%: ICD-10-PCS | Mod: CPTII,,, | Performed by: INTERNAL MEDICINE

## 2023-10-30 PROCEDURE — 90471 FLU VACCINE (QUAD) GREATER THAN OR EQUAL TO 3YO PRESERVATIVE FREE IM: ICD-10-PCS | Mod: ,,, | Performed by: INTERNAL MEDICINE

## 2023-10-30 PROCEDURE — 99213 OFFICE O/P EST LOW 20 MIN: CPT | Mod: 25,,, | Performed by: INTERNAL MEDICINE

## 2023-10-30 PROCEDURE — 1159F PR MEDICATION LIST DOCUMENTED IN MEDICAL RECORD: ICD-10-PCS | Mod: CPTII,,, | Performed by: INTERNAL MEDICINE

## 2023-10-30 PROCEDURE — 90686 IIV4 VACC NO PRSV 0.5 ML IM: CPT | Mod: ,,, | Performed by: INTERNAL MEDICINE

## 2023-10-30 PROCEDURE — 3066F NEPHROPATHY DOC TX: CPT | Mod: CPTII,,, | Performed by: INTERNAL MEDICINE

## 2023-10-30 NOTE — PROGRESS NOTES
Subjective:      Patient ID: Walter Crawford is a 57 y.o. male.    Chief Complaint: Anxiety      HPI:  57-year-old male here today for anxiety  Frequent nighttime awakenings, not sleeping well  He states that he is anxious and fearful; scared to drive  He states he does not feel as confident at work but he has not had any work errors  He states that he is here today because his sisters even pointed out that his mood has changed   He denies any suicidal or homicidal ideations.  He states he is felt this way since he got his COVID vaccine in April of 2021  History of major depression on Paxil 40 and Wellbutrin 300  September of 2022 he decided he wanted to start weaning off some of his psychiatric medications so weaned off Wellbutrin.  Seems as though since then he has been really struggling with anxiety, racing thoughts, trouble sleeping.  We sent him to see Adwoa Jones- she advised for him to start Vraylar he took it for 1 week and then quit taking it.  Again I encouraged him to resume the Vraylar.  He has his 1st appointment with Neurology on Wednesday is truly concerned that something organic is going on    He also took himself off of his Mounjaro 2 weeks ago prior to Mounjaro initiation his A1c was at 8.1 he was control down to 5.5; he reports feeling no better off of the G LP 1    He is drinking 2 beers at night    Past Medical History:  Past Medical History:   Diagnosis Date    ADHD (attention deficit hyperactivity disorder)     Benign essential hypertension 09/12/2022    Depression 09/12/2022    Hyperlipemia 09/12/2022    Microalbuminuria     Musculoskeletal pain     Obesity, unspecified     Personal history of colonic polyps 05/18/2021    Dr Christianne Reeder    Seronegative rheumatoid arthritis     Type 2 diabetes mellitus without complication, without long-term current use of insulin 09/12/2022    Unspecified cataract     Vitamin B12 deficiency      Past Surgical History:   Procedure Laterality Date     Arthroscopic repair medial meniscus  10/15/2019    CATARACT EXTRACTION Right 05/26/2020    CATARACT EXTRACTION Left 11/10/2020    CHOLECYSTECTOMY      COLONOSCOPY W/ POLYPECTOMY  05/18/2021    Dr Christianne Reeder     Review of patient's allergies indicates:  No Known Allergies  Current Outpatient Medications on File Prior to Visit   Medication Sig Dispense Refill    amLODIPine (NORVASC) 10 MG tablet Take 1 tablet (10 mg total) by mouth once daily. 90 tablet 3    atorvastatin (LIPITOR) 40 MG tablet Take 1 tablet (40 mg total) by mouth once daily. 90 tablet 3    blood sugar diagnostic Strp 1 each by Misc.(Non-Drug; Combo Route) route once daily. 100 each 3    leflunomide (ARAVA) 20 MG Tab Take 20 mg by mouth once daily.      metFORMIN (GLUCOPHAGE) 500 MG tablet Take 1 tablet (500 mg total) by mouth daily with breakfast. 60 tablet 11    olmesartan-hydrochlorothiazide (BENICAR HCT) 20-12.5 mg per tablet Take 1 tablet by mouth once daily. 90 tablet 3    paroxetine (PAXIL) 40 MG tablet Take 1 tablet (40 mg total) by mouth once daily. 30 tablet 0    traZODone (DESYREL) 50 MG tablet TAKE 2 TABLETS BY MOUTH EVERY EVENING 60 tablet 3     No current facility-administered medications on file prior to visit.     Social History     Socioeconomic History    Marital status:    Tobacco Use    Smoking status: Every Day     Current packs/day: 1.00     Types: Cigarettes    Smokeless tobacco: Current    Tobacco comments:     Patient would like to quit smoking    Substance and Sexual Activity    Alcohol use: Yes     Alcohol/week: 42.0 standard drinks of alcohol     Types: 42 Cans of beer per week     Comment: Nightly    Drug use: Never    Sexual activity: Yes     Partners: Female     Social Determinants of Health     Financial Resource Strain: Low Risk  (8/10/2023)    Overall Financial Resource Strain (CARDIA)     Difficulty of Paying Living Expenses: Not very hard   Food Insecurity: No Food Insecurity (8/10/2023)    Hunger Vital Sign      Worried About Running Out of Food in the Last Year: Never true     Ran Out of Food in the Last Year: Never true   Transportation Needs: No Transportation Needs (8/10/2023)    PRAPARE - Transportation     Lack of Transportation (Medical): No     Lack of Transportation (Non-Medical): No   Physical Activity: Insufficiently Active (8/10/2023)    Exercise Vital Sign     Days of Exercise per Week: 3 days     Minutes of Exercise per Session: 30 min   Stress: No Stress Concern Present (8/10/2023)    [employer] - Occupational Stress Questionnaire     Feeling of Stress : Not at all   Social Connections: Unknown (8/10/2023)    Social Connection and Isolation Panel [NHANES]     Frequency of Communication with Friends and Family: Three times a week     Frequency of Social Gatherings with Friends and Family: Three times a week     Attends Mandaen Services: More than 4 times per year     Active Member of Clubs or Organizations: Yes     Attends Club or Organization Meetings: 1 to 4 times per year   Housing Stability: Unknown (8/10/2023)    Housing Stability Vital Sign     Unable to Pay for Housing in the Last Year: No     Unstable Housing in the Last Year: No     Family History   Problem Relation Age of Onset    Diabetes Mother     Hypertension Father     Dementia Father     Diabetes Sister     Depression Sister        Review of Systems  A comprehensive review of systems was performed and was negative with exception of what is documented above.     Objective:   BP (P) 138/86 (BP Location: Right arm, Patient Position: Sitting, BP Method: Medium (Manual))   Pulse (P) 83   Temp (P) 98.1 °F (36.7 °C) (Temporal)   Resp (P) 16   Ht (P) 6' (1.829 m)   Wt (P) 100.2 kg (221 lb)   SpO2 (P) 98%   BMI (P) 29.97 kg/m²   Physical Exam  General : Alert and oriented, No acute distress, afebrile.  Eye : PERRLA. EOMI. Normal conjunctiva, Sclerae are nonicteric. No conjunctival injection, no pallor.  HEENT :  Normocephalic/ atraumatic, Normal hearing,   Musculoskeletal : Normal range of motion throughout. No muscle tenderness.  Integumentary : Warm, moist, intact.  Neurologic : Alert, Oriented, no FND  Psychiatric : Cooperative, Flat affect, pressured speech   Assessment/ Plan:   1. Mood disturbance  Assessment & Plan:  Patient follows up with Neurology on Wednesday I did advise for him to start the Vraylar in the interim.  Further recommendations to follow per Neurology        2. Type 2 diabetes mellitus without complication, without long-term current use of insulin  Assessment & Plan:  Patient was advised to resume his G LP 1 as per usual dosing and will see him back in 3 months for a diabetic recheck.      Other orders  -     tirzepatide 7.5 mg/0.5 mL PnIj; Inject 7.5 mg into the skin every 7 days.  Dispense: 4 pen ; Refill: 1             Follow up in about 3 months (around 1/30/2024) for DIABETIC REVISIT, with labs prior to visit.

## 2023-10-30 NOTE — ASSESSMENT & PLAN NOTE
Patient follows up with Neurology on Wednesday I did advise for him to start the Vraylar in the interim.  Further recommendations to follow per Neurology

## 2023-10-30 NOTE — ASSESSMENT & PLAN NOTE
Patient was advised to resume his G LP 1 as per usual dosing and will see him back in 3 months for a diabetic recheck.

## 2023-11-20 ENCOUNTER — TELEPHONE (OUTPATIENT)
Dept: INTERNAL MEDICINE | Facility: CLINIC | Age: 58
End: 2023-11-20
Payer: COMMERCIAL

## 2023-11-20 DIAGNOSIS — G47.00 INSOMNIA, UNSPECIFIED TYPE: ICD-10-CM

## 2023-11-20 DIAGNOSIS — R45.86 MOOD DISTURBANCE: Primary | ICD-10-CM

## 2023-11-20 RX ORDER — TRAZODONE HYDROCHLORIDE 50 MG/1
100 TABLET ORAL
Qty: 60 TABLET | Refills: 0 | Status: SHIPPED | OUTPATIENT
Start: 2023-11-20 | End: 2024-01-30 | Stop reason: SDUPTHER

## 2023-11-20 RX ORDER — CARIPRAZINE 1.5 MG/1
1.5 CAPSULE, GELATIN COATED ORAL DAILY
Qty: 30 CAPSULE | Refills: 3 | Status: SHIPPED | OUTPATIENT
Start: 2023-11-20

## 2023-11-20 NOTE — TELEPHONE ENCOUNTER
"----- Message from David Pool MD sent at 11/20/2023  8:47 AM CST -----  Regarding: RE: Anxiety  Resume vraylar 1.5 daily NOT WELLBUTRIN  Cont Paxil  ----- Message -----  From: Milind Stallings MA  Sent: 11/20/2023   8:25 AM CST  To: David Pool MD  Subject: Anxiety                                          Pt called this morning stating that he is a "nervous wreck." He would like to get back on the medications for his anxiety. He is currently taking the Paxil 40 mg once a day, and nothing else. He stated that he is shaky, nervous, and can barely sleep. Pt mentioned that he was on Wellbutrin previously.       "

## 2024-01-08 ENCOUNTER — TELEPHONE (OUTPATIENT)
Dept: INTERNAL MEDICINE | Facility: CLINIC | Age: 59
End: 2024-01-08
Payer: COMMERCIAL

## 2024-01-30 ENCOUNTER — TELEPHONE (OUTPATIENT)
Dept: INTERNAL MEDICINE | Facility: CLINIC | Age: 59
End: 2024-01-30
Payer: COMMERCIAL

## 2024-01-30 DIAGNOSIS — G47.00 INSOMNIA, UNSPECIFIED TYPE: ICD-10-CM

## 2024-01-30 RX ORDER — TRAZODONE HYDROCHLORIDE 50 MG/1
50 TABLET ORAL DAILY
Qty: 30 TABLET | Refills: 3 | Status: SHIPPED | OUTPATIENT
Start: 2024-01-30 | End: 2024-02-08 | Stop reason: SDUPTHER

## 2024-01-30 NOTE — TELEPHONE ENCOUNTER
----- Message from Donna Colmenares sent at 1/30/2024 11:51 AM CST -----  Regarding: advice  Type:  Needs Medical Advice    Who Called: pt    Best Call Back Number: [phone]  Additional Information: called about receiving a letter from his insurance stating that traZODone (DESYREL) 50 MG tablet is only covered for a 90 day script including refills. Stated that he will need a new script sent in so they can cover the meds. Please advise

## 2024-02-08 ENCOUNTER — TELEPHONE (OUTPATIENT)
Dept: INTERNAL MEDICINE | Facility: CLINIC | Age: 59
End: 2024-02-08
Payer: COMMERCIAL

## 2024-02-08 DIAGNOSIS — G47.00 INSOMNIA, UNSPECIFIED TYPE: ICD-10-CM

## 2024-02-08 DIAGNOSIS — E78.5 HYPERLIPIDEMIA, UNSPECIFIED HYPERLIPIDEMIA TYPE: ICD-10-CM

## 2024-02-08 RX ORDER — TRAZODONE HYDROCHLORIDE 50 MG/1
50 TABLET ORAL DAILY
Qty: 90 TABLET | Refills: 3 | Status: SHIPPED | OUTPATIENT
Start: 2024-02-08 | End: 2024-04-22

## 2024-02-08 RX ORDER — ATORVASTATIN CALCIUM 40 MG/1
40 TABLET, FILM COATED ORAL DAILY
Qty: 90 TABLET | Refills: 3 | Status: SHIPPED | OUTPATIENT
Start: 2024-02-08 | End: 2024-04-22 | Stop reason: SDUPTHER

## 2024-02-08 NOTE — TELEPHONE ENCOUNTER
----- Message from Mady Steven sent at 2/8/2024  8:29 AM CST -----  Regarding: refill request  Type:  RX Refill Request    Who Called:  pt    Refill or New Rx: refill    RX Name and Strength: traZODone (DESYREL) 50 MG tablet  How is the patient currently taking it? (ex. 1XDay): one day  Is this a 30 day or 90 day RX: 90    RX Name and Strength: atorvastatin (LIPITOR) 40 MG tablet  How is the patient currently taking it? (ex. 1XDay): one day  Is this a 30 day or 90 day RX: 90    Preferred Pharmacy with phone number: [facility]    Local or Mail Order: local    Ordering Provider: romina    Would the patient rather a call back or a response via [facility]?  Yes if needed    Best Call Back Number: [phone]    Additional Information:  pt says his insurance advised him to get all Rx filled for 90 day instead of 30 day, pt says insurance will charge him extra if not filled for 90 day supply, pt also [facility], please advise, thanks

## 2024-02-19 ENCOUNTER — TELEPHONE (OUTPATIENT)
Dept: INTERNAL MEDICINE | Facility: CLINIC | Age: 59
End: 2024-02-19
Payer: COMMERCIAL

## 2024-02-19 DIAGNOSIS — E11.9 TYPE 2 DIABETES MELLITUS WITHOUT COMPLICATION, WITHOUT LONG-TERM CURRENT USE OF INSULIN: Primary | ICD-10-CM

## 2024-02-19 NOTE — TELEPHONE ENCOUNTER
----- Message from Milind Stallings MA sent at 2/19/2024 10:28 AM CST -----  Regarding: PV 3/4/24 @ 9:00 Dr. Herman  1. Are there any outstanding tasks in the patient's chart? Yes, fasting labs    2. Is there any documentation in the chart? No    3.Has patient been seen in an ER, Urgent care clinic, or been admitted since last visit?  If yes, When, where, and why    4. Has patient seen any other healthcare providers since last visit?  If yes, when, where, and why    5. Has patient had any bloodwork or XR done since last visit?    6. Is patient signed up for patient portal?

## 2024-03-05 ENCOUNTER — OFFICE VISIT (OUTPATIENT)
Dept: INTERNAL MEDICINE | Facility: CLINIC | Age: 59
End: 2024-03-05
Payer: COMMERCIAL

## 2024-03-05 ENCOUNTER — PATIENT OUTREACH (OUTPATIENT)
Dept: ADMINISTRATIVE | Facility: HOSPITAL | Age: 59
End: 2024-03-05
Payer: COMMERCIAL

## 2024-03-05 VITALS
RESPIRATION RATE: 16 BRPM | DIASTOLIC BLOOD PRESSURE: 92 MMHG | TEMPERATURE: 98 F | HEART RATE: 96 BPM | OXYGEN SATURATION: 98 % | HEIGHT: 72 IN | BODY MASS INDEX: 30.34 KG/M2 | SYSTOLIC BLOOD PRESSURE: 136 MMHG | WEIGHT: 224 LBS

## 2024-03-05 DIAGNOSIS — F22 PARANOIA: ICD-10-CM

## 2024-03-05 DIAGNOSIS — E04.1 THYROID NODULE: ICD-10-CM

## 2024-03-05 DIAGNOSIS — R45.86 MOOD DISTURBANCE: ICD-10-CM

## 2024-03-05 DIAGNOSIS — F41.9 ANXIETY: ICD-10-CM

## 2024-03-05 DIAGNOSIS — F10.10 ALCOHOL ABUSE: Primary | ICD-10-CM

## 2024-03-05 DIAGNOSIS — E11.9 TYPE 2 DIABETES MELLITUS WITHOUT COMPLICATION, WITHOUT LONG-TERM CURRENT USE OF INSULIN: ICD-10-CM

## 2024-03-05 DIAGNOSIS — M06.00 SERONEGATIVE RHEUMATOID ARTHRITIS: ICD-10-CM

## 2024-03-05 PROCEDURE — 1159F MED LIST DOCD IN RCRD: CPT | Mod: CPTII,,, | Performed by: INTERNAL MEDICINE

## 2024-03-05 PROCEDURE — 1160F RVW MEDS BY RX/DR IN RCRD: CPT | Mod: CPTII,,, | Performed by: INTERNAL MEDICINE

## 2024-03-05 PROCEDURE — 99215 OFFICE O/P EST HI 40 MIN: CPT | Mod: ,,, | Performed by: INTERNAL MEDICINE

## 2024-03-05 PROCEDURE — 3008F BODY MASS INDEX DOCD: CPT | Mod: CPTII,,, | Performed by: INTERNAL MEDICINE

## 2024-03-05 PROCEDURE — 3080F DIAST BP >= 90 MM HG: CPT | Mod: CPTII,,, | Performed by: INTERNAL MEDICINE

## 2024-03-05 PROCEDURE — 3075F SYST BP GE 130 - 139MM HG: CPT | Mod: CPTII,,, | Performed by: INTERNAL MEDICINE

## 2024-03-05 RX ORDER — LEFLUNOMIDE 10 MG/1
10 TABLET ORAL DAILY
COMMUNITY
Start: 2024-02-20

## 2024-03-05 NOTE — ASSESSMENT & PLAN NOTE
Patient has not had any recent labs he has not had labs since last summer so those need to be collected.  We are going to continue him on his current regimen for now until we can update those lab studies  Lab Results   Component Value Date    HGBA1C 5.5 06/19/2023

## 2024-03-05 NOTE — LETTER
AUTHORIZATION FOR RELEASE OF   CONFIDENTIAL INFORMATION    Dear [facility],    We are seeing Walter Crawford, date of birth 1965, in the clinic at [place] [address] [facility]. David Pool MD is the patient's PCP. Walter Crawford has an outstanding lab/procedure at the time we reviewed his chart. In order to help keep his health information updated, he has authorized us to request the following medical record(s):        (  )  MAMMOGRAM                                      (  )  COLONOSCOPY      (  )  PAP SMEAR                                          (  )  OUTSIDE LAB RESULTS     (  )  DEXA SCAN                                          ( X ) DM EYE EXAM            (  )  FOOT EXAM                                          (  )  ENTIRE RECORD     (  )  OUTSIDE IMMUNIZATIONS                 (  )  _______________         Please fax records to [facility], [place], Tyler M, MD, [phone]       If you have any questions, please contact Adeline at [phone]            Patient Name: Walter Crawford  : 1965  Patient Phone #: [phone]

## 2024-03-05 NOTE — PROGRESS NOTES
Internal Medicine    Patient ID: [id number]     Chief Complaint: Memory Loss and Diabetes      HPI:     Walter Crawford is a 58 y.o. male here today for a follow up.   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  We have been having ongoing cognitive issues and behavioral issues for the past year his sister finally presents with him for a visit today.  He has a tremor to the right hand ongoing issues with alcohol.  He canceled his neurology appointment never went for his sleep study and no longer following up with his psychiatrist.  He admits to drinking daily and states that he feels better when he drinks so it seems like he is medicating himself because when he does not drink his tremor his anxiety his cognitive impairment is worse.  He has no gait instability has had no falls.  I went through with him and his sister the events that have transpired over the past year he had a fall in March of last year at a convenience store that he reports with a syncopal episode we sent him to Cardiology he wore a monitor we imaged his brain.  We sent referral to Neurology and Psychiatry he is taking himself off and on medicines.  He looks at his sister often to help answer questions he is tremulous he is very anxious he is very nervous.  At the end of the discussion which was about 40 minutes patient agreed to inpatient detox for alcohol.  Past Medical History:   Diagnosis Date    ADHD (attention deficit hyperactivity disorder)     Benign essential hypertension 09/12/2022    Depression 09/12/2022    Hyperlipemia 09/12/2022    Microalbuminuria     Musculoskeletal pain     Obesity, unspecified     Personal history of colonic polyps 05/18/2021    Dr Christianne Reeder    Seronegative rheumatoid arthritis     Type 2 diabetes mellitus without complication, without long-term current use of insulin 09/12/2022     Unspecified cataract     Vitamin B12 deficiency         Past Surgical History:   Procedure Laterality Date    Arthroscopic repair medial meniscus  10/15/2019    CATARACT EXTRACTION Right 05/26/2020    CATARACT EXTRACTION Left 11/10/2020    CHOLECYSTECTOMY      COLONOSCOPY W/ POLYPECTOMY  05/18/2021    Dr Christianne Reeder        Social History     Tobacco Use    Smoking status: Every Day     Current packs/day: 1.00     Types: Cigarettes    Smokeless tobacco: Current    Tobacco comments:     Patient would like to quit smoking    Substance and Sexual Activity    Alcohol use: Yes     Alcohol/week: 42.0 standard drinks of alcohol     Types: 42 Cans of beer per week     Comment: Nightly    Drug use: Never    Sexual activity: Yes     Partners: Female        Current Outpatient Medications   Medication Instructions    amLODIPine (NORVASC) 10 mg, Oral, Daily    atorvastatin (LIPITOR) 40 mg, Oral, Daily    blood sugar diagnostic Strp 1 each, Misc.(Non-Drug; Combo Route), Daily    leflunomide (ARAVA) 10 mg, Oral, Daily    olmesartan-hydrochlorothiazide (BENICAR HCT) 20-12.5 mg per tablet 1 tablet, Oral, Daily    paroxetine (PAXIL) 40 mg, Oral, Daily    tirzepatide 7.5 mg, Subcutaneous, Every 7 days    traZODone (DESYREL) 50 mg, Oral, Daily    VRAYLAR 1.5 mg, Oral, Daily       Review of patient's allergies indicates:  No Known Allergies     Patient Care Team:  David Pool MD as PCP - General (Internal Medicine)  Tavia Helton MD as Consulting Physician (Rheumatology)  Jose Willard MD (Dermatology)  Ron Oliveros DO as Consulting Physician (Cardiology)  Christianne Reeder III, MD as Consulting Physician (Gastroenterology)  Deniz Platt II, MD (Orthopedic Surgery)  Vj Feliz Jr., MD as Consulting Physician (Otolaryngology)  Lyndsay Lafleur OD (Optometry)     Subjective:     Review of Systems    12 point review of systems conducted, negative except as stated in the history of present illness. See HPI  for details.    Objective:     Visit Vitals  BP (!) 136/92 (BP Location: Left arm, Patient Position: Sitting, BP Method: Medium (Manual))   Pulse 96   Temp 97.5 °F (36.4 °C) (Temporal)   Resp 16   Ht 6' (1.829 m)   Wt 101.6 kg (224 lb)   SpO2 98%   BMI 30.38 kg/m²       Physical Exam  General : Alert and oriented, No acute distress, afebrile.  Eye : PERRLA. EOMI. Normal conjunctiva, Sclerae are nonicteric  Respiratory : Respirations are non-labored and clear to auscultation bilaterally. Symmetrical air entry bilaterally, no crackles, no wheezes, no rhonchi. No cyanosis, no clubbing.  Cardiovascular : Normal rate, Regular rhythm. No murmurs, rubs, or gallops. Pulses are 2+ throughout. No JVD. No Edema.  Gastrointestinal : Soft, nontender, non-distended, bowel sounds are present in all quadrants, no organomegaly, no guarding, no rebound.  Musculoskeletal : Normal range of motion throughout. No muscle tenderness.  Integumentary : Warm, moist, intact.  Neurologic : Alert, Oriented,tangential, tremulous, no FND, normal gait/ normal step thru pattern  Psychiatric : Cooperative, Appropriate mood & affect.   Labs Reviewed:     Chemistry:  Lab Results   Component Value Date     06/19/2023    K 4.7 06/19/2023    CHLORIDE 105 06/19/2023    BUN 14.0 06/19/2023    CREATININE 0.84 06/19/2023    EGFRNORACEVR >60 06/19/2023    GLUCOSE 134 (H) 06/19/2023    CALCIUM 9.3 06/19/2023    ALKPHOS 71 06/19/2023    LABPROT 6.9 06/19/2023    ALBUMIN 4.0 06/19/2023    BILIDIR 0.4 05/18/2021    IBILI 0.50 05/18/2021    AST 30 06/19/2023    ALT 43 06/19/2023    TSH 2.899 08/30/2023    XKQJHC9KDES 0.94 08/30/2023    PSA 0.63 02/19/2019        Lab Results   Component Value Date    HGBA1C 5.5 06/19/2023        Hematology:  Lab Results   Component Value Date    WBC 5.6 09/20/2021    HGB 14.9 09/20/2021    HCT 45.7 09/20/2021     09/20/2021       Lipid Panel:  Lab Results   Component Value Date    CHOL 171 06/19/2023    HDL 51  06/19/2023    .00 06/19/2023    TRIG 62 06/19/2023    TOTALCHOLEST 3 06/19/2023        Urine:  Lab Results   Component Value Date    COLORUA Yellow 09/08/2022    APPEARANCEUA Clear 09/08/2022    SGUA 1.017 09/08/2022    PHUA 6.0 09/08/2022    PROTEINUA Negative 09/08/2022    GLUCOSEUA 3+ (A) 09/08/2022    KETONESUA Negative 09/08/2022    BLOODUA Negative 09/08/2022    NITRITESUA Negative 09/08/2022    LEUKOCYTESUR Negative 09/08/2022    RBCUA <5 09/08/2022    WBCUA <5 09/08/2022    BACTERIA None Seen 09/08/2022    CREATRANDUR 68.7 06/19/2023        Assessment:       ICD-10-CM ICD-9-CM   1. Alcohol abuse  F10.10 305.00   2. Thyroid nodule  E04.1 241.0   3. Type 2 diabetes mellitus without complication, without long-term current use of insulin  E11.9 250.00   4. Seronegative rheumatoid arthritis  M06.00 714.0   5. Anxiety  F41.9 300.00   6. Paranoia  F22 297.1   7. Mood disturbance  R45.86 296.90        Plan:     1. Alcohol abuse  Assessment & Plan:  No concerns for course of cough no gait instability, no falls.    First and foremost---Cessation of drinking and nutritional supplementation need to be done      He needs to have his B vitamins checked especially his thiamine and his folate and those vitamins need to be replaced.    Currently awaiting for insurance approval for inpatient detox.        2. Thyroid nodule  -     US Thyroid; Future; Expected date: 03/05/2024    3. Type 2 diabetes mellitus without complication, without long-term current use of insulin  Assessment & Plan:  Patient has not had any recent labs he has not had labs since last summer so those need to be collected.  We are going to continue him on his current regimen for now until we can update those lab studies  Lab Results   Component Value Date    HGBA1C 5.5 06/19/2023         4. Seronegative rheumatoid arthritis  Overview:  Dr Garcia    Assessment & Plan:  Followed by Rheumatology strong caution with using Arava in combination with alcohol  as this can strongly impact the liver function        5. Anxiety    6. Paranoia    7. Mood disturbance         Follow up in about 8 weeks (around 4/30/2024). In addition to their scheduled follow up, the patient has also been instructed to follow up on as needed basis.     No future appointments.     David Pool MD

## 2024-03-05 NOTE — ASSESSMENT & PLAN NOTE
No concerns for course of cough no gait instability, no falls.    First and foremost---Cessation of drinking and nutritional supplementation need to be done      He needs to have his B vitamins checked especially his thiamine and his folate and those vitamins need to be replaced.    Currently awaiting for insurance approval for inpatient detox.

## 2024-03-05 NOTE — ASSESSMENT & PLAN NOTE
Followed by Rheumatology strong caution with using Arava in combination with alcohol as this can strongly impact the liver function

## 2024-03-06 ENCOUNTER — TELEPHONE (OUTPATIENT)
Dept: INTERNAL MEDICINE | Facility: CLINIC | Age: 59
End: 2024-03-06
Payer: COMMERCIAL

## 2024-03-06 NOTE — PROGRESS NOTES
The following record(s)  below were uploaded for Health Maintenance .    DM EYE EXAM 02/13/23    Confirmed 02/2023 was last DM Eye Exam at [facility]

## 2024-03-06 NOTE — TELEPHONE ENCOUNTER
----- Message from Roxana Jules sent at 3/6/2024  9:09 AM CST -----  Regarding: med advice  .Type:  Needs Medical Advice    Who Called: Pt's sister Haydee  Symptoms (please be specific):    How long has patient had these symptoms:    Pharmacy name and phone #:    Would the patient rather a call back or a response via [facility]? Call back  Best Call Back Number: [phone]  Additional Information: Needs to speak with nurse or Dr Herman about pt checking himself into a treatment center on last night.

## 2024-03-06 NOTE — TELEPHONE ENCOUNTER
Spoke to pt's sister Haydee who stated that the admit into [facility] in [place] went well, and the facility did keep their word on matching the price of [facility] inpatient facility. I reminded pt's sister to make an appointment 1-2 weeks following pt's discharge date to f/u with Dr. Herman.

## 2024-03-07 LAB
CHOLEST SERPL-MSCNC: 146 MG/DL (ref 0–200)
HBA1C MFR BLD: 6.7 % (ref 4–6)
HDLC SERPL-MCNC: 68 MG/DL (ref 35–70)
LDLC SERPL CALC-MCNC: 70 MG/DL (ref 0–160)
TRIGL SERPL-MCNC: 124 MG/DL (ref 40–160)

## 2024-03-21 ENCOUNTER — PATIENT MESSAGE (OUTPATIENT)
Dept: ADMINISTRATIVE | Facility: HOSPITAL | Age: 59
End: 2024-03-21
Payer: COMMERCIAL

## 2024-03-21 ENCOUNTER — PATIENT OUTREACH (OUTPATIENT)
Dept: ADMINISTRATIVE | Facility: HOSPITAL | Age: 59
End: 2024-03-21
Payer: COMMERCIAL

## 2024-03-21 DIAGNOSIS — E11.9 TYPE 2 DIABETES MELLITUS WITHOUT COMPLICATION, WITHOUT LONG-TERM CURRENT USE OF INSULIN: Primary | ICD-10-CM

## 2024-03-21 NOTE — PROGRESS NOTES
Population Health Outreach.    Health Maintenance Topic(s) Outreach Outcomes & Actions Taken:    Eye Exam - Outreach Outcomes & Actions Taken  : Confirmed last DM eye exam with [facility] was completed Feb. 2023 . Due.     Lab(s) - Outreach Outcomes & Actions Taken  : Overdue Lab(s) Scheduled and Has future orders for labs due. He canceled his 3 month DM appointment with labs on 03.04.23 and has no future appointments scheduled.     Blood Pressure - Outreach Outcomes & Actions Taken  : Last BP reading in chart elevated  . Patient does see cardiology, record request sent.            Additional Notes:    Patient Portal Status    Active   Last User Login    11/19/2023   No future PCP appointments   Called patient ,no answer. Left voicemail.  Sent portal message also.       Care Management, Digital Medicine, and/or Education Referrals      Next Steps - Referral Actions: Digital Medicine Outcomes and Actions Taken: Pt Declined or Not Eligible

## 2024-03-21 NOTE — LETTER
AUTHORIZATION FOR RELEASE OF   CONFIDENTIAL INFORMATION    Dear Dr. Oliveros,    We are seeing Walter Crawford, date of birth 1965, in the clinic at [place] [address] [facility]. David Pool MD is the patient's PCP. Walter Crawford has an outstanding lab/procedure at the time we reviewed his chart. In order to help keep his health information updated, he has authorized us to request the following medical record(s):        (  )  MAMMOGRAM                                      (  )  COLONOSCOPY      (  )  PAP SMEAR                                          (  )  OUTSIDE LAB RESULTS     (  )  DEXA SCAN                                          (  )  EYE EXAM            (  )  FOOT EXAM                                          (  )  ENTIRE RECORD     ( X )  Lipid Panel                 ( X )  Blood Pressure         Please fax records to [facility], [place], Tyler M, MD, [phone]       If you have any questions, please contact Adeline at [phone]            Patient Name: Walter Crawford  : 1965  Patient Phone #: [phone]

## 2024-03-25 NOTE — PROGRESS NOTES
I received a fax from Dr. Oliveros's office with labs from 9/8/22, labs are already linked into chart. No BP.

## 2024-04-12 ENCOUNTER — DOCUMENTATION ONLY (OUTPATIENT)
Dept: INTERNAL MEDICINE | Facility: CLINIC | Age: 59
End: 2024-04-12
Payer: COMMERCIAL

## 2024-04-18 VITALS — SYSTOLIC BLOOD PRESSURE: 132 MMHG | DIASTOLIC BLOOD PRESSURE: 78 MMHG

## 2024-04-18 NOTE — PROGRESS NOTES
Population Health Outreach.    Left voicemail.   Called for BP follow up.  Patient called back and reported remote BP reading for this /78.     Scheduled in-office DM eye exam since history of negative diabetic retinopathy and has appointment in 4 days. Note placed in appointment notes to collect after MD sees.

## 2024-04-22 ENCOUNTER — CLINICAL SUPPORT (OUTPATIENT)
Dept: INTERNAL MEDICINE | Facility: CLINIC | Age: 59
End: 2024-04-22
Attending: INTERNAL MEDICINE
Payer: COMMERCIAL

## 2024-04-22 ENCOUNTER — PATIENT MESSAGE (OUTPATIENT)
Dept: NEUROLOGY | Facility: CLINIC | Age: 59
End: 2024-04-22
Payer: COMMERCIAL

## 2024-04-22 ENCOUNTER — OFFICE VISIT (OUTPATIENT)
Dept: INTERNAL MEDICINE | Facility: CLINIC | Age: 59
End: 2024-04-22
Payer: COMMERCIAL

## 2024-04-22 VITALS
TEMPERATURE: 99 F | HEART RATE: 69 BPM | RESPIRATION RATE: 18 BRPM | BODY MASS INDEX: 29.5 KG/M2 | SYSTOLIC BLOOD PRESSURE: 118 MMHG | HEIGHT: 72 IN | OXYGEN SATURATION: 96 % | DIASTOLIC BLOOD PRESSURE: 46 MMHG | WEIGHT: 217.81 LBS

## 2024-04-22 DIAGNOSIS — E78.5 HYPERLIPIDEMIA, UNSPECIFIED HYPERLIPIDEMIA TYPE: ICD-10-CM

## 2024-04-22 DIAGNOSIS — R41.89 COGNITIVE IMPAIRMENT: ICD-10-CM

## 2024-04-22 DIAGNOSIS — F32.A DEPRESSION, UNSPECIFIED DEPRESSION TYPE: ICD-10-CM

## 2024-04-22 DIAGNOSIS — E11.9 TYPE 2 DIABETES MELLITUS WITHOUT COMPLICATION, WITHOUT LONG-TERM CURRENT USE OF INSULIN: Primary | ICD-10-CM

## 2024-04-22 DIAGNOSIS — E11.9 TYPE 2 DIABETES MELLITUS WITHOUT COMPLICATION, WITHOUT LONG-TERM CURRENT USE OF INSULIN: ICD-10-CM

## 2024-04-22 DIAGNOSIS — F10.10 ALCOHOL ABUSE: ICD-10-CM

## 2024-04-22 PROCEDURE — 92228 IMG RTA DETC/MNTR DS PHY/QHP: CPT | Mod: 26,,, | Performed by: OPTOMETRIST

## 2024-04-22 PROCEDURE — 3044F HG A1C LEVEL LT 7.0%: CPT | Mod: CPTII,,, | Performed by: INTERNAL MEDICINE

## 2024-04-22 PROCEDURE — 92228 IMG RTA DETC/MNTR DS PHY/QHP: CPT | Mod: TC,,, | Performed by: INTERNAL MEDICINE

## 2024-04-22 PROCEDURE — 99214 OFFICE O/P EST MOD 30 MIN: CPT | Mod: ,,, | Performed by: INTERNAL MEDICINE

## 2024-04-22 PROCEDURE — 1160F RVW MEDS BY RX/DR IN RCRD: CPT | Mod: CPTII,,, | Performed by: INTERNAL MEDICINE

## 2024-04-22 PROCEDURE — 1159F MED LIST DOCD IN RCRD: CPT | Mod: CPTII,,, | Performed by: INTERNAL MEDICINE

## 2024-04-22 PROCEDURE — 3078F DIAST BP <80 MM HG: CPT | Mod: CPTII,,, | Performed by: INTERNAL MEDICINE

## 2024-04-22 PROCEDURE — 3008F BODY MASS INDEX DOCD: CPT | Mod: CPTII,,, | Performed by: INTERNAL MEDICINE

## 2024-04-22 PROCEDURE — 3074F SYST BP LT 130 MM HG: CPT | Mod: CPTII,,, | Performed by: INTERNAL MEDICINE

## 2024-04-22 PROCEDURE — 2025F 7 FLD RTA PHOTO W/O RTNOPTHY: CPT | Mod: CPTII,,, | Performed by: OPTOMETRIST

## 2024-04-22 RX ORDER — MIRTAZAPINE 30 MG/1
30 TABLET, FILM COATED ORAL NIGHTLY
COMMUNITY
Start: 2024-03-06 | End: 2024-04-25 | Stop reason: SDUPTHER

## 2024-04-22 RX ORDER — ESCITALOPRAM OXALATE 20 MG/1
20 TABLET ORAL DAILY
COMMUNITY
Start: 2024-04-18 | End: 2024-04-30 | Stop reason: SDUPTHER

## 2024-04-22 RX ORDER — PROPRANOLOL HYDROCHLORIDE 10 MG/1
10 TABLET ORAL 2 TIMES DAILY
Qty: 90 TABLET | Refills: 3 | Status: SHIPPED | OUTPATIENT
Start: 2024-04-22

## 2024-04-22 RX ORDER — PROPRANOLOL HYDROCHLORIDE 10 MG/1
10 TABLET ORAL 2 TIMES DAILY
COMMUNITY
Start: 2024-03-13 | End: 2024-04-22 | Stop reason: SDUPTHER

## 2024-04-22 RX ORDER — ATORVASTATIN CALCIUM 40 MG/1
40 TABLET, FILM COATED ORAL DAILY
Qty: 90 TABLET | Refills: 3 | Status: SHIPPED | OUTPATIENT
Start: 2024-04-22

## 2024-04-22 NOTE — ASSESSMENT & PLAN NOTE
Currently on MounFiber Optionsro 7.5  Lab Results   Component Value Date    HGBA1C 6.7 (A) 03/07/2024     
no gait instability, no falls.    Cessation of drinking x 4 weeks  Not taking any supplements   He has labs scheduled for the morning     He has been back on his paroxetine 40 for the past 5 days but prior to that he has been off of it for the past month and he has no longer on Vraylar 1.7 mg his anxiety is being managed with Lexapro and mirtazapine which seems to be working well.  He was also before he was on the mirtazapine he was on trazodone which I told him he should not be taking with the mirtazapine.  Of course we want him to see Neurology tomorrow to rule out any other organic forms of his cognitive impairment and tremor.    
Former smoker

## 2024-04-22 NOTE — PROGRESS NOTES
Internal Medicine    Patient ID: [id number]     Chief Complaint: Follow-up (Rehab fu ) and Memory Loss      HPI:     Walter Crawford is a 58 y.o. male here today for a follow up.    Patient recently discharged from [facility] for alcohol addiction   He denies being placed on any supplements while he was there   Spent about 4-6 weeks since last time I saw him and I can tell a huge difference in his disposition and in his anxiety he is less tremulous still tremulous to that right hand but not nearly to the degree he was before   He is no longer on Vraylar-- which was problem maybe exacerbating some of his akathisia is especially when combined with alcohol he was only on a 1.7 mg.  He has no longer on paroxetine but he did take it this morning and he is taking it since April 18th but prior to that he had been off of it for a month.  His sleep is being maintained with mirtazapine 30 and his anxiety with Lexapro 20.  He needs some labs updated he has an establish care visit with Neurology tomorrow.  Past Medical History:   Diagnosis Date    ADHD (attention deficit hyperactivity disorder)     Benign essential hypertension 09/12/2022    Depression 09/12/2022    Hyperlipemia 09/12/2022    Microalbuminuria     Musculoskeletal pain     Obesity, unspecified     Personal history of colonic polyps 05/18/2021    Dr Christianne Reeder    Seronegative rheumatoid arthritis     Type 2 diabetes mellitus without complication, without long-term current use of insulin 09/12/2022    Unspecified cataract     Vitamin B12 deficiency         Past Surgical History:   Procedure Laterality Date    Arthroscopic repair medial meniscus  10/15/2019    CATARACT EXTRACTION Right 05/26/2020    CATARACT EXTRACTION Left 11/10/2020    CHOLECYSTECTOMY      COLONOSCOPY W/ POLYPECTOMY  05/18/2021    Dr Christianne Reeder        Social History     Tobacco Use    Smoking status: Every Day     Current packs/day: 1.00     Types: Cigarettes    Smokeless tobacco: Current     Tobacco comments:     Patient would like to quit smoking    Substance and Sexual Activity    Alcohol use: Yes     Alcohol/week: 42.0 standard drinks of alcohol     Types: 42 Cans of beer per week     Comment: Nightly    Drug use: Never    Sexual activity: Yes     Partners: Female        Current Outpatient Medications   Medication Instructions    amLODIPine (NORVASC) 10 mg, Oral, Daily    atorvastatin (LIPITOR) 40 mg, Oral, Daily    blood sugar diagnostic Strp 1 each, Misc.(Non-Drug; Combo Route), Daily    EScitalopram oxalate (LEXAPRO) 20 mg, Oral, Daily    leflunomide (ARAVA) 10 mg, Oral, Daily    mirtazapine (REMERON) 30 mg, Oral, Nightly    olmesartan-hydrochlorothiazide (BENICAR HCT) 20-12.5 mg per tablet 1 tablet, Oral, Daily    propranoloL (INDERAL) 10 mg, Oral, 2 times daily    tirzepatide 7.5 mg, Subcutaneous, Every 7 days       Review of patient's allergies indicates:  No Known Allergies     Patient Care Team:  David Pool MD as PCP - General (Internal Medicine)  Tavia Helton MD as Consulting Physician (Rheumatology)  Jose Willard MD (Dermatology)  Ron Oliveros DO as Consulting Physician (Cardiology)  Christianne Reeder III, MD as Consulting Physician (Gastroenterology)  Deniz Platt II, MD (Orthopedic Surgery)  Vj Feliz Jr., MD as Consulting Physician (Otolaryngology)  Lyndsay Lafleur OD (Optometry)  Adeline Lerner LPN as Care Coordinator     Subjective:     Review of Systems    12 point review of systems conducted, negative except as stated in the history of present illness. See HPI for details.    Objective:     Visit Vitals  BP (!) 118/46 (BP Location: Right arm, Patient Position: Sitting, BP Method: Medium (Manual))   Pulse 69   Temp 98.6 °F (37 °C) (Temporal)   Resp 18   Ht 6' (1.829 m)   Wt 98.8 kg (217 lb 12.8 oz)   SpO2 96%   BMI 29.54 kg/m²       Physical Exam  Constitutional:       Appearance: Normal appearance.   HENT:      Head: Normocephalic and  atraumatic.   Eyes:      Extraocular Movements: Extraocular movements intact.      Pupils: Pupils are equal, round, and reactive to light.   Cardiovascular:      Rate and Rhythm: Normal rate and regular rhythm.   Pulmonary:      Effort: Pulmonary effort is normal.      Breath sounds: Normal breath sounds.   Abdominal:      General: Bowel sounds are normal.      Palpations: Abdomen is soft.   Musculoskeletal:         General: Normal range of motion.   Skin:     General: Skin is warm and dry.   Neurological:      General: No focal deficit present.      Mental Status: He is alert.      Comments:  Flat affect, right hand tremor noted   Psychiatric:         Mood and Affect: Mood normal.         Labs Reviewed:     Chemistry:  Lab Results   Component Value Date     06/19/2023    K 4.7 06/19/2023    CHLORIDE 105 06/19/2023    BUN 14.0 06/19/2023    CREATININE 0.84 06/19/2023    EGFRNORACEVR >60 06/19/2023    GLUCOSE 134 (H) 06/19/2023    CALCIUM 9.3 06/19/2023    ALKPHOS 71 06/19/2023    LABPROT 6.9 06/19/2023    ALBUMIN 4.0 06/19/2023    BILIDIR 0.4 05/18/2021    IBILI 0.50 05/18/2021    AST 30 06/19/2023    ALT 43 06/19/2023    TSH 2.899 08/30/2023    [id number] 0.94 08/30/2023    PSA 0.63 02/19/2019        Lab Results   Component Value Date    HGBA1C 6.7 (A) 03/07/2024        Hematology:  Lab Results   Component Value Date    WBC 5.6 09/20/2021    HGB 14.9 09/20/2021    HCT 45.7 09/20/2021     09/20/2021       Lipid Panel:  Lab Results   Component Value Date    CHOL 146 03/07/2024    HDL 68 03/07/2024    .00 06/19/2023    TRIG 124 03/07/2024    TOTALCHOLEST 3 06/19/2023        Urine:  Lab Results   Component Value Date    COLORUA Yellow 09/08/2022    APPEARANCEUA Clear 09/08/2022    SGUA 1.017 09/08/2022    PHUA 6.0 09/08/2022    PROTEINUA Negative 09/08/2022    GLUCOSEUA 3+ (A) 09/08/2022    KETONESUA Negative 09/08/2022    BLOODUA Negative 09/08/2022    NITRITESUA Negative 09/08/2022     LEUKOCYTESUR Negative 09/08/2022    RBCUA <5 09/08/2022    WBCUA <5 09/08/2022    BACTERIA None Seen 09/08/2022    MARCDUR 68.7 06/19/2023        Assessment:       ICD-10-CM ICD-9-CM   1. Type 2 diabetes mellitus without complication, without long-term current use of insulin  E11.9 250.00   2. Depression, unspecified depression type  F32.A 311   3. Hyperlipidemia, unspecified hyperlipidemia type  E78.5 272.4   4. Alcohol abuse  F10.10 305.00   5. Cognitive impairment  R41.89 294.9        Plan:     1. Type 2 diabetes mellitus without complication, without long-term current use of insulin  Assessment & Plan:    Currently on Mounjaro 7.5  Lab Results   Component Value Date    HGBA1C 6.7 (A) 03/07/2024       Orders:  -     Lipid Panel; Future; Expected date: 04/22/2024  -     Comprehensive Metabolic Panel; Future; Expected date: 04/22/2024  -     Hemoglobin A1C; Future; Expected date: 04/22/2024  -     Microalbumin/Creatinine Ratio, Urine  -     Vitamin B12; Future; Expected date: 04/22/2024  -     Folate; Future; Expected date: 04/22/2024  -     Pyridoxal 5-Phosphate (PLP); Future; Expected date: 04/22/2024    2. Depression, unspecified depression type    3. Hyperlipidemia, unspecified hyperlipidemia type  -     atorvastatin (LIPITOR) 40 MG tablet; Take 1 tablet (40 mg total) by mouth once daily.  Dispense: 90 tablet; Refill: 3  -     Lipid Panel; Future; Expected date: 04/22/2024  -     Comprehensive Metabolic Panel; Future; Expected date: 04/22/2024  -     Hemoglobin A1C; Future; Expected date: 04/22/2024  -     Microalbumin/Creatinine Ratio, Urine  -     Vitamin B12; Future; Expected date: 04/22/2024  -     Folate; Future; Expected date: 04/22/2024  -     Pyridoxal 5-Phosphate (PLP); Future; Expected date: 04/22/2024    4. Alcohol abuse  Assessment & Plan:  no gait instability, no falls.    Cessation of drinking x 4 weeks  Not taking any supplements   He has labs scheduled for the morning     He has been back on  his paroxetine 40 for the past 5 days but prior to that he has been off of it for the past month and he has no longer on Vraylar 1.7 mg his anxiety is being managed with Lexapro and mirtazapine which seems to be working well.  He was also before he was on the mirtazapine he was on trazodone which I told him he should not be taking with the mirtazapine.  Of course we want him to see Neurology tomorrow to rule out any other organic forms of his cognitive impairment and tremor.        5. Cognitive impairment    Other orders  -     propranoloL (INDERAL) 10 MG tablet; Take 1 tablet (10 mg total) by mouth 2 (two) times daily.  Dispense: 90 tablet; Refill: 3         Follow up in about 3 months (around 7/22/2024) for DIABETIC REVISIT. In addition to their scheduled follow up, the patient has also been instructed to follow up on as needed basis.     Future Appointments   Date Time Provider Department Center   4/23/2024 11:00 AM Kyra Fletcher FNP-C [facility] [address]   8/5/2024 10:40 AM David Pool MD [facility] [id number]        David Pool MD

## 2024-04-22 NOTE — PROGRESS NOTES
Walter Crawford is a 58 y.o. male here for a diabetic eye screening with non-dilated fundus photos per Lucia LOU .    Patient cooperative?: Yes  Small pupils?: No  Last eye exam: UNKNOWN     For exam results, see Encounter Report.

## 2024-04-23 ENCOUNTER — PATIENT MESSAGE (OUTPATIENT)
Dept: INTERNAL MEDICINE | Facility: CLINIC | Age: 59
End: 2024-04-23
Payer: COMMERCIAL

## 2024-04-23 ENCOUNTER — OFFICE VISIT (OUTPATIENT)
Dept: NEUROLOGY | Facility: CLINIC | Age: 59
End: 2024-04-23
Payer: COMMERCIAL

## 2024-04-23 VITALS
HEIGHT: 72 IN | BODY MASS INDEX: 29.39 KG/M2 | WEIGHT: 217 LBS | SYSTOLIC BLOOD PRESSURE: 120 MMHG | DIASTOLIC BLOOD PRESSURE: 86 MMHG

## 2024-04-23 DIAGNOSIS — G47.33 OBSTRUCTIVE SLEEP APNEA: ICD-10-CM

## 2024-04-23 DIAGNOSIS — R22.0 MASS OF SKIN OF HEAD: Primary | ICD-10-CM

## 2024-04-23 DIAGNOSIS — F10.27: Primary | ICD-10-CM

## 2024-04-23 DIAGNOSIS — G47.9 SLEEP DISTURBANCE: ICD-10-CM

## 2024-04-23 DIAGNOSIS — F17.200 CURRENT SMOKER: ICD-10-CM

## 2024-04-23 PROCEDURE — 3008F BODY MASS INDEX DOCD: CPT | Mod: CPTII,S$GLB,, | Performed by: NURSE PRACTITIONER

## 2024-04-23 PROCEDURE — 99999 PR PBB SHADOW E&M-EST. PATIENT-LVL III: CPT | Mod: PBBFAC,,, | Performed by: NURSE PRACTITIONER

## 2024-04-23 PROCEDURE — 3074F SYST BP LT 130 MM HG: CPT | Mod: CPTII,S$GLB,, | Performed by: NURSE PRACTITIONER

## 2024-04-23 PROCEDURE — 3044F HG A1C LEVEL LT 7.0%: CPT | Mod: CPTII,S$GLB,, | Performed by: NURSE PRACTITIONER

## 2024-04-23 PROCEDURE — 1159F MED LIST DOCD IN RCRD: CPT | Mod: CPTII,S$GLB,, | Performed by: NURSE PRACTITIONER

## 2024-04-23 PROCEDURE — 3079F DIAST BP 80-89 MM HG: CPT | Mod: CPTII,S$GLB,, | Performed by: NURSE PRACTITIONER

## 2024-04-23 PROCEDURE — 99205 OFFICE O/P NEW HI 60 MIN: CPT | Mod: S$GLB,,, | Performed by: NURSE PRACTITIONER

## 2024-04-23 NOTE — PROGRESS NOTES
New Neurology Patient     SUBJECTIVE:  Patient ID: Walter Crawford   58 y.o.   Referred By: Dr. David Auhja*    Past Medical History:   Diagnosis Date    ADHD (attention deficit hyperactivity disorder)     Benign essential hypertension 09/12/2022    Depression 09/12/2022    Hyperlipemia 09/12/2022    Microalbuminuria     Musculoskeletal pain     Obesity, unspecified     Personal history of colonic polyps 05/18/2021    Dr Christianne Reeder    Seronegative rheumatoid arthritis     Type 2 diabetes mellitus without complication, without long-term current use of insulin 09/12/2022    Unspecified cataract     Vitamin B12 deficiency      Past Surgical History:   Procedure Laterality Date    Arthroscopic repair medial meniscus  10/15/2019    CATARACT EXTRACTION Right 05/26/2020    CATARACT EXTRACTION Left 11/10/2020    CHOLECYSTECTOMY      COLONOSCOPY W/ POLYPECTOMY  05/18/2021    Dr Christianne Reeder     Review of patient's allergies indicates:  No Known Allergies    Chief Complaint: New Patient referred by  for Neurologic consultation to evaluate cognitive complaints and sleep disturbance    History of Present Illness:  New Patient referred by  for Neurologic consultation to evaluate cognitive complaints and sleep disturbance    Sleep/ DANYEL  Pertinent positives/ negatives:  Snoring:  unknown  Witnessed apnea: no  Awaken from sleep: gasping for air no  Frequent awakenings: yes  Unrefreshed awakenings: yes  Vivid dreams: no  Excessive daytime sleepiness: no  Admits fatigue  Nap during the day: no  Sleep study in the past: no     Takes Mirtazapine 30mg nightly for insomnia, which is working well    Memory  Pt reports onset of memory decline 10 yrs ago    Recent worsening short term memory 2-3 yrs ago   After back surgery and 2nd COVID vaccine, change in mood, temper + cognitive decline  Admits repetition in conversation     Recently out of rehab for alcohol abuse  - Admitted March 5 2024; stayed for 6 wks  -  Abusing alcohol since COVID  - At worst point, would drink 1-2 pints of bourbon daily  - Would start drinking at 2pm  - Currently sober    ADL's   - Living situation: middle son recently moved back in with him   - Driving:    Currently drives    - Cooking:  Rarely cooks   - Grooming:  Remember to bathe, but feels as though its a chore   - Highest level of education: Bachelors degree in Marketing  - Work: Last worked in Jan or Feb 2024. 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 1 lives with him, 1 lives locally and 1 lives in [place]  -  POA's: none in place  - 2 sisters      Finances:   -  No issues    Appetite:  - Good/stable.   - Lost about 5 lbs since he's returned from treatment    Mood   - Depression:  yes   - Anxiety:  yes   - [ Was on Vrylar, Trazodone and Paroxitine;  switched to Lexapro and stopped Vrylar and Trazodone yesterday]    Family history of dementia: father has it (@ 84 y/O)    Admits balance issues. Denies falls      Review of Systems - as per HPI, otherwise a balanced 10 systems review is negative.      Current Medications:  Current Outpatient Medications   Medication Instructions    amLODIPine (NORVASC) 10 mg, Oral, Daily    atorvastatin (LIPITOR) 40 mg, Oral, Daily    blood sugar diagnostic Strp 1 each, Misc.(Non-Drug; Combo Route), Daily    EScitalopram oxalate (LEXAPRO) 20 mg, Oral, Daily    leflunomide (ARAVA) 10 mg, Oral, Daily    mirtazapine (REMERON) 30 mg, Oral, Nightly    olmesartan-hydrochlorothiazide (BENICAR HCT) 20-12.5 mg per tablet 1 tablet, Oral, Daily    propranoloL (INDERAL) 10 mg, Oral, 2 times daily    tirzepatide 7.5 mg, Subcutaneous, Every 7 days       OBJECTIVE:  Vitals:  /86 (BP Location: Left arm, Patient Position: Sitting)   Ht 6' (1.829 m)   Wt 98.4 kg (217 lb)   BMI 29.43 kg/m²       General Exam  Accompanied by - sister (Haydee Dumont)  appearance - well appearing, no apparent distress,  unassisted  oropharynx - Mallampati score class 3, ok dentition  heart - regular rate and rhythm auscultated   skin- Large lesion on L scalp    Neurological  cortical function - The patient is alert, attentive, and MMSE 26/30; cooperative with exam, good eye contact  Speech - hesitant; looks at sister for answers  cranial nerves:  CN 2 - visual fields are full to confrontation. Pupils are equal and react to light  CN 3, 4, 6 EOMs - normal. No ptosis or lateral gaze deviation  CN 7 - no face asymmetry; normal eye closure and smile  CN 8 - hearing is grossly normal  CN 9, 10, 11- palate elevates symmetrically. Shoulder shrug and head turn intact  CN 12 tongue - protrudes midline with normal movements and no atrophy  motor - No pronator drift. Muscle bulk and tone normal. Arose from chair with arms folded. No lateralizing or focal deficits noted  gait - unassisted, posture upright. gait is steady with normal steps, arm swing and turning.    reflexes - 2+ and symmetric at knees and 1+ ankles  tone - normal  sensation - vib and light touch intact  coordination - finger to nose intact. Romberg absent  Tremor- R hand >L mild action tremor      Review of Data:   Outside/ prior notes reviewed     Imaging:  Results for orders placed or performed during the hospital encounter of 09/12/23   MRI Brain W WO Contrast    Narrative    EXAMINATION:  MRI BRAIN W WO CONTRAST    CLINICAL HISTORY:  Other amnesia Memory loss;Mental status change, unknown cause;    TECHNIQUE:  Multiplanar, multisequence MR images of the brain were obtained with and without administration of intravenous contrast.    COMPARISON:  None    FINDINGS:  There is no restricted diffusion, hemorrhage or edema.  Mild scattered T2/FLAIR hyperintensities in the subcortical and periventricular white matter likely represent chronic microvascular ischemic changes.  There is no abnormal parenchymal or leptomeningeal enhancement.    There is no mass effect or midline shift.   The basal cisterns are patent.  There is mild diffuse parenchymal volume loss.  There is no hydrocephalus or abnormal extra-axial fluid collection.  The major intracranial flow voids are patent.  There is mild scattered paranasal sinus mucosal thickening.      Impression    1. No acute intracranial abnormality.  2. Mild chronic microvascular ischemic changes.      Electronically signed by: Lizbeth Ramos  Date:    09/12/2023  Time:    14:05     Labs from 3/7/24  Folate 18.5  T4 1.81  TSH 4.56  Ammonia 55      ASSESSMENT/ PLAN:    Problem List Items Addressed This Visit         Mild dementia associated with alcoholism, with mood disturbance    -  Primary     The following was discussed with both patient and sister:    - Differential diagnosis and treatment options discussed.  Cognitive changes likely related to long term alcohol use.   Continue abstinence from alcohol. Some recovery may be possible with nutritional repletion (if necessary) and abstinence    - Reviewed brain imaging, and lab work            3/7/24  T4 1.81  TSH 4.56       Will order a Thiamine level (to rule out  thiamine deficiency/ Wernickes) then recheck T4, TSH    - Discussed that depression and dementia share certain traits. Ensure that depression is well managed.     - Screening for DANYEL and importance of treatment, if present    - Reviewed meds for high anticholinergic burden or meds known to cause memory impairment. Avoid Benadryl. Discuss any prescription medications you might be taking with your doctor to avoid medications which can cause sedation or worsen cognitive function.    - Safety concerns reviewed     - Legal and financial matters discussed;  Advised he review legal and financial matters with his family, such as power of , advance directives, and lewis. It is important to ensure that necessary legal documents are in place while the patient can still participate in decision-making.    - To prevent further memory loss, some  of the best preventative measures are following a healthy diet, getting regular exercise, and ensuring good sleep habits.            Obstructive sleep apnea        - Lengthy discussion about the possible diagnosis (of sleep apnea), testing, treatment and the risks of untreated sleep apnea. Potential need for DANYEL treatment discussed including CPAP or Bilevel PAP. Alternatives to PAP discussed if PAP not ultimately tolerated. Benefits of In lab testing versus HST discussed. Pt agreed to proceed with HST      Sleep disturbance    - Insomnia stable with nightly Remeron    Current smoker        - Encouraged smoking cessation          Items discussed include acute and/or chronic neurological, sleep, or other issues and their attendant differential diagnoses.  Potential for additional testing, treatment options, and prognosis also discussed.  Questions and concerns were sought and answered to the patient's stated verbal satisfaction.    The patient verbalizes understanding and agreement with the above stated treatment plan.     ___single dx _**__multiple issues/ diagnoses  ___ low __mod __*_ high complexity of data  ___low __mod __*_ high risks     Medical Decision Making (MDM) used for CPT choice:  ___low  ___moderate  __*__high        CARMINA Walker  [facility]  [phone]

## 2024-04-24 ENCOUNTER — PATIENT MESSAGE (OUTPATIENT)
Dept: INTERNAL MEDICINE | Facility: CLINIC | Age: 59
End: 2024-04-24
Payer: COMMERCIAL

## 2024-04-24 ENCOUNTER — TELEPHONE (OUTPATIENT)
Dept: INTERNAL MEDICINE | Facility: CLINIC | Age: 59
End: 2024-04-24
Payer: COMMERCIAL

## 2024-04-24 ENCOUNTER — PROCEDURE VISIT (OUTPATIENT)
Dept: SLEEP MEDICINE | Facility: HOSPITAL | Age: 59
End: 2024-04-24
Attending: NURSE PRACTITIONER
Payer: COMMERCIAL

## 2024-04-24 ENCOUNTER — LAB VISIT (OUTPATIENT)
Dept: LAB | Facility: HOSPITAL | Age: 59
End: 2024-04-24
Attending: INTERNAL MEDICINE
Payer: COMMERCIAL

## 2024-04-24 DIAGNOSIS — E55.9 VITAMIN D DEFICIENCY: ICD-10-CM

## 2024-04-24 DIAGNOSIS — E78.5 HYPERLIPIDEMIA, UNSPECIFIED HYPERLIPIDEMIA TYPE: ICD-10-CM

## 2024-04-24 DIAGNOSIS — G47.33 OBSTRUCTIVE SLEEP APNEA: ICD-10-CM

## 2024-04-24 DIAGNOSIS — F10.27: ICD-10-CM

## 2024-04-24 DIAGNOSIS — G47.9 SLEEP DISTURBANCE: Primary | ICD-10-CM

## 2024-04-24 DIAGNOSIS — E11.9 TYPE 2 DIABETES MELLITUS WITHOUT COMPLICATION, WITHOUT LONG-TERM CURRENT USE OF INSULIN: Primary | ICD-10-CM

## 2024-04-24 DIAGNOSIS — Z12.5 SCREENING PSA (PROSTATE SPECIFIC ANTIGEN): ICD-10-CM

## 2024-04-24 DIAGNOSIS — Z00.00 WELLNESS EXAMINATION: ICD-10-CM

## 2024-04-24 DIAGNOSIS — E11.9 TYPE 2 DIABETES MELLITUS WITHOUT COMPLICATION, WITHOUT LONG-TERM CURRENT USE OF INSULIN: ICD-10-CM

## 2024-04-24 DIAGNOSIS — E53.8 VITAMIN B12 DEFICIENCY: ICD-10-CM

## 2024-04-24 LAB
ALBUMIN SERPL-MCNC: 3.9 G/DL (ref 3.4–4.8)
ALBUMIN/GLOB SERPL: 1.3 RATIO (ref 1.1–2)
ALP SERPL-CCNC: 73 UNIT/L (ref 40–150)
ALT SERPL-CCNC: 31 UNIT/L (ref 0–55)
APPEARANCE UR: CLEAR
AST SERPL-CCNC: 21 UNIT/L (ref 5–34)
BACTERIA #/AREA URNS AUTO: NORMAL /HPF
BILIRUB SERPL-MCNC: 0.9 MG/DL
BILIRUB UR QL STRIP.AUTO: ABNORMAL
BUN SERPL-MCNC: 10.8 MG/DL (ref 8.4–25.7)
CALCIUM SERPL-MCNC: 10 MG/DL (ref 8.8–10)
CHLORIDE SERPL-SCNC: 106 MMOL/L (ref 98–107)
CHOLEST SERPL-MCNC: 121 MG/DL
CHOLEST/HDLC SERPL: 3 {RATIO} (ref 0–5)
CO2 SERPL-SCNC: 25 MMOL/L (ref 23–31)
COLOR UR AUTO: ABNORMAL
CREAT SERPL-MCNC: 1.03 MG/DL (ref 0.73–1.18)
CREAT UR-MCNC: 292.3 MG/DL (ref 63–166)
DEPRECATED CALCIDIOL+CALCIFEROL SERPL-MC: 36.5 NG/ML (ref 30–80)
EST. AVERAGE GLUCOSE BLD GHB EST-MCNC: 145.6 MG/DL
FOLATE SERPL-MCNC: 10.4 NG/ML (ref 7–31.4)
GFR SERPLBLD CREATININE-BSD FMLA CKD-EPI: >60 MLS/MIN/1.73/M2
GLOBULIN SER-MCNC: 3 GM/DL (ref 2.4–3.5)
GLUCOSE SERPL-MCNC: 156 MG/DL (ref 82–115)
GLUCOSE UR QL STRIP.AUTO: NEGATIVE
HBA1C MFR BLD: 6.7 %
HDLC SERPL-MCNC: 36 MG/DL (ref 35–60)
KETONES UR QL STRIP.AUTO: ABNORMAL
LDLC SERPL CALC-MCNC: 69 MG/DL (ref 50–140)
LEUKOCYTE ESTERASE UR QL STRIP.AUTO: NEGATIVE
MICROALBUMIN UR-MCNC: 25.5 UG/ML
MICROALBUMIN/CREAT RATIO PNL UR: 8.7 MG/GM CR (ref 0–30)
NITRITE UR QL STRIP.AUTO: NEGATIVE
PH UR STRIP.AUTO: 5.5 [PH]
POTASSIUM SERPL-SCNC: 4.1 MMOL/L (ref 3.5–5.1)
PROT SERPL-MCNC: 6.9 GM/DL (ref 5.8–7.6)
PROT UR QL STRIP.AUTO: NEGATIVE
RBC #/AREA URNS AUTO: NORMAL /HPF
RBC UR QL AUTO: NEGATIVE
SODIUM SERPL-SCNC: 141 MMOL/L (ref 136–145)
SP GR UR STRIP.AUTO: >=1.03 (ref 1–1.03)
SQUAMOUS #/AREA URNS AUTO: NORMAL /HPF
TRIGL SERPL-MCNC: 81 MG/DL (ref 34–140)
TSH SERPL-ACNC: 3.52 UIU/ML (ref 0.35–4.94)
UROBILINOGEN UR STRIP-ACNC: 0.2
VIT B12 SERPL-MCNC: 221 PG/ML (ref 213–816)
VLDLC SERPL CALC-MCNC: 16 MG/DL
WBC #/AREA URNS AUTO: NORMAL /HPF

## 2024-04-24 PROCEDURE — 80053 COMPREHEN METABOLIC PANEL: CPT

## 2024-04-24 PROCEDURE — 83036 HEMOGLOBIN GLYCOSYLATED A1C: CPT

## 2024-04-24 PROCEDURE — 84207 ASSAY OF VITAMIN B-6: CPT

## 2024-04-24 PROCEDURE — 36415 COLL VENOUS BLD VENIPUNCTURE: CPT

## 2024-04-24 PROCEDURE — 84443 ASSAY THYROID STIM HORMONE: CPT

## 2024-04-24 PROCEDURE — 82043 UR ALBUMIN QUANTITATIVE: CPT

## 2024-04-24 PROCEDURE — 82607 VITAMIN B-12: CPT

## 2024-04-24 PROCEDURE — 80061 LIPID PANEL: CPT

## 2024-04-24 PROCEDURE — 84425 ASSAY OF VITAMIN B-1: CPT

## 2024-04-24 PROCEDURE — 95806 SLEEP STUDY UNATT&RESP EFFT: CPT

## 2024-04-24 PROCEDURE — 82746 ASSAY OF FOLIC ACID SERUM: CPT

## 2024-04-24 PROCEDURE — 84436 ASSAY OF TOTAL THYROXINE: CPT

## 2024-04-24 PROCEDURE — 95806 SLEEP STUDY UNATT&RESP EFFT: CPT | Mod: 26,,, | Performed by: SPECIALIST

## 2024-04-24 PROCEDURE — 82306 VITAMIN D 25 HYDROXY: CPT

## 2024-04-24 PROCEDURE — 81001 URINALYSIS AUTO W/SCOPE: CPT

## 2024-04-24 RX ORDER — LANOLIN ALCOHOL/MO/W.PET/CERES
100 CREAM (GRAM) TOPICAL DAILY
Qty: 90 TABLET | Refills: 3 | Status: SHIPPED | OUTPATIENT
Start: 2024-04-24

## 2024-04-24 RX ORDER — FOLIC ACID 1 MG/1
1 TABLET ORAL DAILY
Qty: 90 TABLET | Refills: 3 | Status: SHIPPED | OUTPATIENT
Start: 2024-04-24 | End: 2025-04-24

## 2024-04-24 NOTE — PROGRESS NOTES
Laboratory studies reviewed blood glucose at 156  A1c at 6.7 I would like to increase his Mounjaro to 10 mg    B12 is profoundly low-  recommend B12 2000 mcg daily; available over-the-counter  Vitamin D3 2000 IU daily OTC  Rx for folic acid and thiamine sent to pharmacy  Recheck B12, folic acid, thiamine, BMP and A1c again in 3 months

## 2024-04-24 NOTE — TELEPHONE ENCOUNTER
----- Message from David Pool MD sent at 4/24/2024  2:24 PM CDT -----  Laboratory studies reviewed blood glucose at 156  A1c at 6.7 I would like to increase his Mounjaro to 10 mg    B12 is profoundly low-  recommend B12 2000 mcg daily; available over-the-counter  Vitamin D3 2000 IU daily OTC  Rx for folic acid and thiamine sent to pharmacy  Recheck B12, folic acid, thiamine, BMP and A1c again in 3 months

## 2024-04-25 ENCOUNTER — PATIENT MESSAGE (OUTPATIENT)
Dept: INTERNAL MEDICINE | Facility: CLINIC | Age: 59
End: 2024-04-25
Payer: COMMERCIAL

## 2024-04-25 LAB — T4 SERPL IA-MCNC: 9.8 MCG/DL (ref 4.5–11.7)

## 2024-04-25 NOTE — TELEPHONE ENCOUNTER
----- Message from Michele Graff sent at 4/25/2024  3:36 PM CDT -----  .Type:  RX Refill Request    Who Called: [facility]   Refill or New Rx: Refill   RX Name and Strength: mirtazapine (REMERON) 30 MG tablet  How is the patient currently taking it? (ex. 1XDay):  Is this a 30 day or 90 day RX:  Preferred Pharmacy with phone number: [facility] # [phone]  Local or Mail Order: Local   Ordering Provider:Batsheva  Would the patient rather a call back or a response via [facility]?   Best Call Back Number:[phone]  Additional Information: Please call with any questions.

## 2024-04-26 RX ORDER — MIRTAZAPINE 30 MG/1
30 TABLET, FILM COATED ORAL NIGHTLY
Qty: 30 TABLET | Refills: 0 | Status: SHIPPED | OUTPATIENT
Start: 2024-04-26

## 2024-04-27 ENCOUNTER — PATIENT MESSAGE (OUTPATIENT)
Dept: INTERNAL MEDICINE | Facility: CLINIC | Age: 59
End: 2024-04-27
Payer: COMMERCIAL

## 2024-04-27 DIAGNOSIS — E53.8 VITAMIN B12 DEFICIENCY: ICD-10-CM

## 2024-04-27 DIAGNOSIS — E11.9 TYPE 2 DIABETES MELLITUS WITHOUT COMPLICATION, WITHOUT LONG-TERM CURRENT USE OF INSULIN: Primary | ICD-10-CM

## 2024-04-27 LAB — VIT B1 BLD-SCNC: 126 NMOL/L (ref 70–180)

## 2024-04-28 ENCOUNTER — PATIENT MESSAGE (OUTPATIENT)
Dept: INTERNAL MEDICINE | Facility: CLINIC | Age: 59
End: 2024-04-28
Payer: COMMERCIAL

## 2024-04-29 LAB — PYRIDOXAL PHOS SERPL-MCNC: 7 MCG/L (ref 5–50)

## 2024-04-30 ENCOUNTER — TELEPHONE (OUTPATIENT)
Dept: INTERNAL MEDICINE | Facility: CLINIC | Age: 59
End: 2024-04-30
Payer: COMMERCIAL

## 2024-04-30 DIAGNOSIS — F32.A DEPRESSION, UNSPECIFIED DEPRESSION TYPE: Primary | ICD-10-CM

## 2024-04-30 DIAGNOSIS — F32.A DEPRESSION, UNSPECIFIED DEPRESSION TYPE: ICD-10-CM

## 2024-04-30 RX ORDER — ESCITALOPRAM OXALATE 20 MG/1
20 TABLET ORAL DAILY
Qty: 30 TABLET | Refills: 2 | Status: SHIPPED | OUTPATIENT
Start: 2024-04-30

## 2024-04-30 RX ORDER — ESCITALOPRAM OXALATE 20 MG/1
20 TABLET ORAL DAILY
Qty: 30 TABLET | Refills: 2 | Status: SHIPPED | OUTPATIENT
Start: 2024-04-30 | End: 2024-04-30 | Stop reason: SDUPTHER

## 2024-04-30 NOTE — TELEPHONE ENCOUNTER
----- Message from Liliana Topete sent at 4/30/2024  9:29 AM CDT -----  [facility]    EScitalopram oxalate (LEXAPRO) 20 MG tablet.  Take 20 mg by mouth once daily. - Oral  30 day package.    Patient is transferring back to this pharmacy and the pharmacy needs a RX script. Pharmacy ask that you do not send to [facility].

## 2024-04-30 NOTE — TELEPHONE ENCOUNTER
----- Message from Cece Curiel sent at 4/30/2024 10:06 AM CDT -----  .Type:  Patient Returning Call    Who Called:pt  Who Left Message for Patient:pt  Does the patient know what this is regarding?:call back  Would the patient rather a call back or a response via [facility]?   Best Call Back Number:[phone]   Additional Information: Please send EScitalopram oxalate (LEXAPRO) 20 MG tablet    To [facility] in [place]    Please only send medications to this location per patient

## 2024-05-01 ENCOUNTER — PATIENT MESSAGE (OUTPATIENT)
Dept: INTERNAL MEDICINE | Facility: CLINIC | Age: 59
End: 2024-05-01
Payer: COMMERCIAL

## 2024-05-01 ENCOUNTER — PATIENT MESSAGE (OUTPATIENT)
Dept: NEUROLOGY | Facility: CLINIC | Age: 59
End: 2024-05-01
Payer: COMMERCIAL

## 2024-05-01 ENCOUNTER — TELEPHONE (OUTPATIENT)
Dept: NEUROLOGY | Facility: CLINIC | Age: 59
End: 2024-05-01
Payer: COMMERCIAL

## 2024-05-01 NOTE — TELEPHONE ENCOUNTER
----- Message from Vania Boyd sent at 4/30/2024  2:14 PM CDT -----  Regarding: HST Results  Please contact patient to discuss Hst results(may need cardio referral). No appt requested per Dr. Cardozo.   results scanned in media tab under chart review  patient can be reached at: [phone]

## 2024-05-03 ENCOUNTER — PATIENT MESSAGE (OUTPATIENT)
Dept: INTERNAL MEDICINE | Facility: CLINIC | Age: 59
End: 2024-05-03
Payer: COMMERCIAL

## 2024-05-05 ENCOUNTER — PATIENT MESSAGE (OUTPATIENT)
Dept: INTERNAL MEDICINE | Facility: CLINIC | Age: 59
End: 2024-05-05
Payer: COMMERCIAL

## 2024-05-13 ENCOUNTER — CLINICAL SUPPORT (OUTPATIENT)
Dept: INTERNAL MEDICINE | Facility: CLINIC | Age: 59
End: 2024-05-13
Payer: COMMERCIAL

## 2024-05-13 DIAGNOSIS — E11.9 TYPE 2 DIABETES MELLITUS WITHOUT COMPLICATION, WITHOUT LONG-TERM CURRENT USE OF INSULIN: Primary | ICD-10-CM

## 2024-05-13 NOTE — PROGRESS NOTES
Patient came into office today to be guided through on how to apply Freestyle Asa 3.   Pt was able to apply Freestyle Asa 3 with little/no discomfort.

## 2024-05-28 DIAGNOSIS — E78.5 HYPERLIPIDEMIA, UNSPECIFIED HYPERLIPIDEMIA TYPE: ICD-10-CM

## 2024-05-28 DIAGNOSIS — I10 BENIGN ESSENTIAL HYPERTENSION: ICD-10-CM

## 2024-05-28 RX ORDER — OLMESARTAN MEDOXOMIL AND HYDROCHLOROTHIAZIDE 20/12.5 20; 12.5 MG/1; MG/1
1 TABLET ORAL
Qty: 90 TABLET | Refills: 1 | Status: SHIPPED | OUTPATIENT
Start: 2024-05-28

## 2024-05-28 RX ORDER — AMLODIPINE BESYLATE 10 MG/1
10 TABLET ORAL
Qty: 30 TABLET | Refills: 4 | Status: SHIPPED | OUTPATIENT
Start: 2024-05-28

## 2024-07-22 DIAGNOSIS — E11.9 TYPE 2 DIABETES MELLITUS WITHOUT COMPLICATION, WITHOUT LONG-TERM CURRENT USE OF INSULIN: Primary | ICD-10-CM

## 2024-07-22 DIAGNOSIS — E53.8 VITAMIN B12 DEFICIENCY: ICD-10-CM

## 2024-07-24 ENCOUNTER — TELEPHONE (OUTPATIENT)
Dept: INTERNAL MEDICINE | Facility: CLINIC | Age: 59
End: 2024-07-24
Payer: COMMERCIAL

## 2024-07-24 NOTE — TELEPHONE ENCOUNTER
----- Message from Milind Stallings MA sent at 7/22/2024  9:07 AM CDT -----  Regarding: PV 8/5/24 @ 10:40 Dr. Herman  1. Are there any outstanding tasks in the patient's chart? Yes, fasting labs    2. Is there any documentation in the chart? No    3.Has patient been seen in an ER, Urgent care clinic, or been admitted since last visit?  If yes, When, where, and why    4. Has patient seen any other healthcare providers since last visit?  If yes, when, where, and why    5. Has patient had any bloodwork or XR done since last visit?    6. Is patient signed up for patient portal?

## 2024-07-25 ENCOUNTER — PATIENT MESSAGE (OUTPATIENT)
Dept: INTERNAL MEDICINE | Facility: CLINIC | Age: 59
End: 2024-07-25
Payer: COMMERCIAL

## 2024-09-13 DIAGNOSIS — F32.A DEPRESSION, UNSPECIFIED DEPRESSION TYPE: ICD-10-CM

## 2024-09-13 DIAGNOSIS — E55.9 VITAMIN D DEFICIENCY: ICD-10-CM

## 2024-09-13 DIAGNOSIS — E11.9 TYPE 2 DIABETES MELLITUS WITHOUT COMPLICATIONS: ICD-10-CM

## 2024-09-13 DIAGNOSIS — M06.00 SERONEGATIVE RHEUMATOID ARTHRITIS: Primary | ICD-10-CM

## 2024-09-16 RX ORDER — TIRZEPATIDE 7.5 MG/.5ML
INJECTION, SOLUTION SUBCUTANEOUS
Qty: 2 ML | Refills: 0 | Status: SHIPPED | OUTPATIENT
Start: 2024-09-16

## 2024-09-16 RX ORDER — NICOTINE 11MG/24HR
2000 PATCH, TRANSDERMAL 24 HOURS TRANSDERMAL NIGHTLY
Qty: 30 CAPSULE | Refills: 0 | Status: SHIPPED | OUTPATIENT
Start: 2024-09-16

## 2024-09-16 RX ORDER — LEFLUNOMIDE 10 MG/1
10 TABLET ORAL
Qty: 30 TABLET | Refills: 0 | Status: SHIPPED | OUTPATIENT
Start: 2024-09-16

## 2024-09-16 RX ORDER — LEVOMEFOLATE MAGNESIUM, LEUCOVORIN, FOLIC ACID, FERROUS CYSTEINE GLYCINATE, MAGNESIUM ASCORBATE, ZINC ASCORBATE, COCARBOXYLASE, FLAVIN ADENINE DINUCLEOTIDE, NADH, PYRIDOXAL PHOSPHATE ANHYDROUS, COBAMAMIDE, BETAINE, MAGNESIUM L-THREONATE, 1,2-DOCOSAHEXANOYL-SN-GLYCERO-3-PHOSPHOSERINE CALCIUM, 1,2-ICOSAPENTOYL-SN-GLYCERO-3-PHOSPHOSERINE CALCIUM, AND PHOSPHATIDYL SERINE 5.23; 2.5; 1; 13.6; 24; 1; 25; 25; 25; 25; 50; 500; 1; 6.4; 800; 12 MG/1; MG/1; MG/1; MG/1; MG/1; MG/1; UG/1; UG/1; UG/1; UG/1; UG/1; UG/1; MG/1; MG/1; UG/1; MG/1
1 CAPSULE, DELAYED RELEASE PELLETS ORAL 2 TIMES DAILY
Qty: 60 CAPSULE | Refills: 0 | Status: SHIPPED | OUTPATIENT
Start: 2024-09-16

## 2024-09-16 RX ORDER — ESCITALOPRAM OXALATE 20 MG/1
TABLET ORAL
Qty: 45 TABLET | Refills: 0 | Status: SHIPPED | OUTPATIENT
Start: 2024-09-16

## 2024-09-16 RX ORDER — MIRTAZAPINE 30 MG/1
TABLET, FILM COATED ORAL
Qty: 30 TABLET | Refills: 0 | Status: SHIPPED | OUTPATIENT
Start: 2024-09-16

## 2024-10-07 ENCOUNTER — TELEPHONE (OUTPATIENT)
Dept: FAMILY MEDICINE | Facility: CLINIC | Age: 59
End: 2024-10-07
Payer: MEDICAID

## 2024-10-14 ENCOUNTER — OFFICE VISIT (OUTPATIENT)
Dept: FAMILY MEDICINE | Facility: CLINIC | Age: 59
End: 2024-10-14
Payer: MEDICAID

## 2024-10-14 VITALS
BODY MASS INDEX: 29.08 KG/M2 | HEIGHT: 72 IN | TEMPERATURE: 97 F | HEART RATE: 72 BPM | WEIGHT: 214.69 LBS | OXYGEN SATURATION: 99 % | DIASTOLIC BLOOD PRESSURE: 68 MMHG | RESPIRATION RATE: 17 BRPM | SYSTOLIC BLOOD PRESSURE: 107 MMHG

## 2024-10-14 DIAGNOSIS — Z76.89 ENCOUNTER TO ESTABLISH CARE: Primary | ICD-10-CM

## 2024-10-14 DIAGNOSIS — R25.1 TREMOR: ICD-10-CM

## 2024-10-14 DIAGNOSIS — F32.A DEPRESSION, UNSPECIFIED DEPRESSION TYPE: ICD-10-CM

## 2024-10-14 DIAGNOSIS — F10.10 ALCOHOL ABUSE: ICD-10-CM

## 2024-10-14 DIAGNOSIS — E11.9 TYPE 2 DIABETES MELLITUS WITHOUT COMPLICATION, WITHOUT LONG-TERM CURRENT USE OF INSULIN: ICD-10-CM

## 2024-10-14 DIAGNOSIS — R41.3 OTHER AMNESIA: ICD-10-CM

## 2024-10-14 DIAGNOSIS — E78.2 MIXED HYPERLIPIDEMIA: ICD-10-CM

## 2024-10-14 DIAGNOSIS — R41.89 COGNITIVE IMPAIRMENT: ICD-10-CM

## 2024-10-14 PROCEDURE — 99214 OFFICE O/P EST MOD 30 MIN: CPT | Mod: ,,, | Performed by: NURSE PRACTITIONER

## 2024-10-14 PROCEDURE — 3074F SYST BP LT 130 MM HG: CPT | Mod: CPTII,,, | Performed by: NURSE PRACTITIONER

## 2024-10-14 PROCEDURE — 3078F DIAST BP <80 MM HG: CPT | Mod: CPTII,,, | Performed by: NURSE PRACTITIONER

## 2024-10-14 PROCEDURE — 3061F NEG MICROALBUMINURIA REV: CPT | Mod: CPTII,,, | Performed by: NURSE PRACTITIONER

## 2024-10-14 PROCEDURE — 3008F BODY MASS INDEX DOCD: CPT | Mod: CPTII,,, | Performed by: NURSE PRACTITIONER

## 2024-10-14 PROCEDURE — 1159F MED LIST DOCD IN RCRD: CPT | Mod: CPTII,,, | Performed by: NURSE PRACTITIONER

## 2024-10-14 PROCEDURE — 3051F HG A1C>EQUAL 7.0%<8.0%: CPT | Mod: CPTII,,, | Performed by: NURSE PRACTITIONER

## 2024-10-14 PROCEDURE — 3066F NEPHROPATHY DOC TX: CPT | Mod: CPTII,,, | Performed by: NURSE PRACTITIONER

## 2024-10-14 RX ORDER — CARIPRAZINE 1.5 MG/1
1.5 CAPSULE, GELATIN COATED ORAL EVERY MORNING
COMMUNITY
Start: 2024-09-16

## 2024-10-14 RX ORDER — CARBIDOPA AND LEVODOPA 25; 100 MG/1; MG/1
1 TABLET ORAL 3 TIMES DAILY
COMMUNITY
End: 2024-10-17 | Stop reason: SDUPTHER

## 2024-10-14 RX ORDER — CALCIUM CARBONATE/VITAMIN D3 600 MG-10
100 TABLET ORAL DAILY
COMMUNITY
Start: 2024-09-13

## 2024-10-14 NOTE — PROGRESS NOTES
SUBJECTIVE:     History of Present Illness      Chief Complaint: Establish Care (Here to get established.  No complaints at this time.  Previous PCP David Herman.)    HPI:  Patient is a 58 y.o. year old male who presents to clinic patient.  Patient is present with his brother-in-law Mario.    Previous PCP Dr. Pack patient here today states it PCP does not accept his new  insurance.  past medical history of diabetes mellitus, depression, hyperlipidemia, alcohol abuse.  Patient is currently living in assisted living [facility].  Previously he was in sober living rehab for history of alcohol abuse.  It does appear the patient has a MRI of the brain September 23 with chronic microvascular ischemic changes.  He reports memory loss and confusion.  Not sure of his secondary related to alcoholism.  Patient and family reports that memory decline over the last several years 3-4 years in the past he did follow up with Dr. Cardozo / [facility] team  for memory loss.  He reports since being transferred to sober living he has not been able to follow up with some of these appointments.  He continues to have tremors and memory loss.     During his visit he was able to contact his previous mental health psychiatrist Adwoa Sin NP  to get appointment for tomorrow for medication management  of anxiety depression.   Denies SI, HI or hallucinations.  During visit patient mentions that he may not remember told to him.  Brother-in-law is with him writing down notes per EMR in the past like patient was self medicating with alcohol.  He has been sober for three-months.       Review of Systems:    Review of Systems    12 point review of systems conducted, negative except as stated in the history of present illness. See HPI for details.     Previous History    Jama Martinez, ANABELLE  Review of patient's allergies indicates:  No Known Allergies    Past Medical History:   Diagnosis Date    ADHD (attention deficit hyperactivity disorder)      Benign essential hypertension 09/12/2022    Depression 09/12/2022    Hyperlipemia 09/12/2022    Microalbuminuria     Musculoskeletal pain     Obesity, unspecified     Personal history of colonic polyps 05/18/2021    Dr Christianne Reeder    Seronegative rheumatoid arthritis     Type 2 diabetes mellitus without complication, without long-term current use of insulin 09/12/2022    Unspecified cataract     Vitamin B12 deficiency      Current Outpatient Medications   Medication Instructions    amLODIPine (NORVASC) 10 mg, Oral    atorvastatin (LIPITOR) 40 mg, Oral, Daily    blood sugar diagnostic Strp 1 each, Misc.(Non-Drug; Combo Route), Daily    carbidopa-levodopa  mg (SINEMET)  mg per tablet 1 tablet, 3 times daily    EScitalopram oxalate (LEXAPRO) 20 MG tablet take one and one-half tablet by mouth DAILY    folic acid (FOLVITE) 1 mg, Oral, Daily    leflunomide (ARAVA) 10 mg, Oral    mirtazapine (REMERON) 30 MG tablet TAKE 1 TABLET by mouth DAILY AT BEDTIME for sleep    MOUNJARO 7.5 mg/0.5 mL PnIj INJECT 7.5MG subcutaneously every week    multivit41-iron-folate8-ps-dha (ENBRACE HR) 1.5 mg iron- 8.73 mg-6.4 mg capsule 1 capsule, Oral, 2 times daily    krposcgo41/iron/folate8/ps-dha (ENBRACE HR ORAL) 2 times daily    olmesartan-hydrochlorothiazide (BENICAR HCT) 20-12.5 mg per tablet 1 tablet, Oral    propranoloL (INDERAL) 10 mg, Oral, 2 times daily    thiamine 100 mg, Oral, Daily    tirzepatide 10 mg, Subcutaneous, Every 7 days    VITAMIN B-1 (MONONITRATE) 100 mg, Daily    VITAMIN D3 2,000 Units, Oral, Nightly    VRAYLAR 1.5 mg, Every morning     Past Surgical History:   Procedure Laterality Date    Arthroscopic repair medial meniscus  10/15/2019    CATARACT EXTRACTION Right 05/26/2020    CATARACT EXTRACTION Left 11/10/2020    CHOLECYSTECTOMY      COLONOSCOPY W/ POLYPECTOMY  05/18/2021    Dr Christianne Reeder     Family History   Problem Relation Name Age of Onset    Diabetes Mother      Hypertension Father       Dementia Father      Diabetes Sister      Depression Sister         Social History     Tobacco Use    Smoking status: Every Day     Current packs/day: 1.00     Types: Cigarettes    Smokeless tobacco: Current    Tobacco comments:     Patient would like to quit smoking    Substance Use Topics    Alcohol use: Yes     Alcohol/week: 42.0 standard drinks of alcohol     Types: 42 Cans of beer per week     Comment: Nightly    Drug use: Never        Health Maintenance      Health Maintenance   Topic Date Due    Hepatitis C Screening  Never done    Foot Exam  Never done    Shingles Vaccine (2 of 3) 10/08/2018    Hemoglobin A1c  01/04/2025    Eye Exam  04/23/2025    Lipid Panel  07/04/2025    Low Dose Statin  10/14/2025    Colorectal Cancer Screening  05/18/2026    TETANUS VACCINE  07/13/2032       OBJECTIVE:     Physical Exam      Vital Signs Reviewed   Visit Vitals  /68   Pulse 72   Temp 97.2 °F (36.2 °C) (Oral)   Resp 17   Ht 6' (1.829 m)   Wt 97.4 kg (214 lb 11.2 oz)   SpO2 99%   BMI 29.12 kg/m²       Physical Exam    Physical Exam:  General: Alert, well nourished, no acute distress, non-toxic appearing.   Eyes: Anicteric sclera, without conjunctival injection, normal lids, no purulent drainage, EOMs grossly intact.   Ears: No tragal tenderness. Tympanic membranes intact, pearly grey, without effusion or erythema and with a positive light reflex.   Mouth: Posterior pharynx without erythema. No exudate, ulcerations, or lesion. No tonsillar swelling.   Neck: Supple, full ROM, no rigidity, no cervical adenopathy.   Cardio: Normal rate and rhythm    Resp: Respirations even and unlabored, clear to auscultation bilaterally.   Abd: No ecchymosis or distension. Normal bowel sounds in all 4 quadrants. No tenderness to palpation. No rebound tenderness or guarding. No CVA tenderness.   Skin: No rashes or open lesions noted.   MSK: No swelling. No abrasions or signs of trauma. Ambulating without assistance.   Neuro:  Alert,oriented No focal deficits noted. Facial expressions even.   Psych: Cooperative, Normal affect      Procedures    Procedures     Labs     Results for orders placed or performed in visit on 07/03/24   Urinalysis, Reflex to Urine Culture    Collection Time: 07/03/24  6:00 PM    Specimen: Urine   Result Value Ref Range    Color, UA Yellow Yellow, Light-Yellow, Dark Yellow, Marilyn, Straw    Appearance, UA Clear Clear    Specific Gravity, UA 1.020 1.005 - 1.030    pH, UA 5.5 5.0 - 8.5    Protein, UA Negative Negative    Glucose,  (A) Negative, Normal    Ketones, UA Negative Negative    Blood, UA Negative Negative    Bilirubin, UA Negative Negative    Urobilinogen, UA 0.2 0.2, 1.0, Normal    Nitrites, UA Negative Negative    Leukocyte Esterase, UA Negative Negative   Drug Screen, Urine    Collection Time: 07/03/24  6:00 PM   Result Value Ref Range    Amphetamines, Urine Negative Negative    Barbiturates, Urine Negative Negative    Benzodiazepine, Urine Negative Negative    Cannabinoids, Urine Negative Negative    Cocaine, Urine Negative Negative    Opiates, Urine Negative Negative    Phencyclidine, Urine Negative Negative    pH, Urine 5.5 3.0 - 11.0    Specific Gravity, Urine Auto 1.020 1.001 - 1.035   Urinalysis, Microscopic    Collection Time: 07/03/24  6:00 PM   Result Value Ref Range    Bacteria, UA None Seen None Seen, Rare, Occasional /HPF    RBC, UA None Seen None Seen, 0-2, 3-5, 0-5 /HPF    WBC, UA None Seen None Seen, 0-2, 3-5, 0-5 /HPF    Squamous Epithelial Cells, UA Rare None Seen, Rare, Occasional, Occ /HPF   CBC Without Differential    Collection Time: 07/04/24  1:10 AM   Result Value Ref Range    WBC 6.31 4.50 - 11.50 x10(3)/mcL    RBC 4.39 (L) 4.70 - 6.10 x10(6)/mcL    Hgb 13.6 (L) 14.0 - 18.0 g/dL    Hct 39.3 (L) 42.0 - 52.0 %    MCV 89.5 80.0 - 94.0 fL    MCH 31.0 27.0 - 31.0 pg    MCHC 34.6 33.0 - 36.0 g/dL    RDW 12.7 11.5 - 17.0 %    Platelet 208 130 - 400 x10(3)/mcL    MPV 10.0 7.4 - 10.4  fL   Comprehensive Metabolic Panel    Collection Time: 07/04/24  1:10 AM   Result Value Ref Range    Sodium 141 136 - 145 mmol/L    Potassium 4.3 3.5 - 5.1 mmol/L    Chloride 106 98 - 107 mmol/L    CO2 29 23 - 31 mmol/L    Glucose 207 (H) 82 - 115 mg/dL    Blood Urea Nitrogen 12.0 8.4 - 25.7 mg/dL    Creatinine 1.28 (H) 0.73 - 1.18 mg/dL    Calcium 9.5 8.8 - 10.0 mg/dL    Protein Total 6.3 5.8 - 7.6 gm/dL    Albumin 3.6 3.4 - 4.8 g/dL    Globulin 2.7 2.4 - 3.5 gm/dL    Albumin/Globulin Ratio 1.3 1.1 - 2.0 ratio    Bilirubin Total 0.6 <=1.5 mg/dL    ALP 80 40 - 150 unit/L    ALT 34 0 - 55 unit/L    AST 24 5 - 34 unit/L    eGFR >60 mL/min/1.73/m2    Anion Gap 6.0 mEq/L    BUN/Creatinine Ratio 9    Hemoglobin A1C    Collection Time: 07/04/24  1:10 AM   Result Value Ref Range    Hemoglobin A1c 7.8 (H) <=7.0 %    Estimated Average Glucose 177.2 mg/dL   Lipid Panel    Collection Time: 07/04/24  1:10 AM   Result Value Ref Range    Cholesterol Total 175 <=200 mg/dL    HDL Cholesterol 53 35 - 60 mg/dL    Triglyceride 103 34 - 140 mg/dL    Cholesterol/HDL Ratio 3 0 - 5    Very Low Density Lipoprotein 21     LDL Cholesterol 101.00 50.00 - 140.00 mg/dL   TSH    Collection Time: 07/04/24  1:10 AM   Result Value Ref Range    TSH 3.076 0.350 - 4.940 uIU/mL       Chemistry:  Lab Results   Component Value Date     07/04/2024    K 4.3 07/04/2024    BUN 12.0 07/04/2024    CREATININE 1.28 (H) 07/04/2024    EGFRNORACEVR >60 07/04/2024    GLUCOSE 207 (H) 07/04/2024    CALCIUM 9.5 07/04/2024    ALKPHOS 80 07/04/2024    LABPROT 6.3 07/04/2024    ALBUMIN 3.6 07/04/2024    BILIDIR 0.4 05/18/2021    IBILI 0.50 05/18/2021    AST 24 07/04/2024    ALT 34 07/04/2024    [id number] 36.5 04/24/2024    TSH 2.263 08/28/2024    [id number] 0.94 08/30/2023    PSA 0.63 02/19/2019        Lab Results   Component Value Date    HGBA1C 7.8 (H) 07/04/2024        Hematology:  Lab Results   Component Value Date    WBC 6.31 07/04/2024    HGB 13.6 (L)  07/04/2024    HCT 39.3 (L) 07/04/2024     07/04/2024       Lipid Panel:  Lab Results   Component Value Date    CHOL 175 07/04/2024    HDL 53 07/04/2024    .00 07/04/2024    TRIG 103 07/04/2024    TOTALCHOLEST 3 07/04/2024        Urine:  Lab Results   Component Value Date    APPEARANCEUA Clear 07/03/2024    SGUA 1.020 07/03/2024    PROTEINUA Negative 07/03/2024    KETONESUA Negative 07/03/2024    LEUKOCYTESUR Negative 07/03/2024    RBCUA None Seen 07/03/2024    WBCUA None Seen 07/03/2024    BACTERIA None Seen 07/03/2024    CREATRANDUR 292.3 (H) 04/24/2024         Assessment            ICD-10-CM ICD-9-CM   1. Encounter to establish care  Z76.89 V65.8   2. Type 2 diabetes mellitus without complication, without long-term current use of insulin  E11.9 250.00   3. Depression, unspecified depression type  F32.A 311   4. Cognitive impairment  R41.89 294.9   5. Other amnesia  R41.3 780.93   6. Alcohol abuse  F10.10 305.00   7. Mixed hyperlipidemia  E78.2 272.2       Plan       1. Encounter to establish care  - CBC Auto Differential; Future  - Comprehensive Metabolic Panel; Future  - Lipid Panel; Future  - TSH; Future  - Hemoglobin A1C; Future  - Urinalysis; Future  - PSA, Screening; Future  - Microalbumin/Creatinine Ratio, Urine; Future  - Hepatitis C Antibody; Future  - Urinalysis    2. Type 2 diabetes mellitus without complication, without long-term current use of insulin  - Hemoglobin A1C; Future  - Microalbumin/Creatinine Ratio, Urine; Future  Continue Current medication as prescribed.  Current A1c   Previous A1c   Low carb low sugar diet.   Follow ADA, examine feet daily.   Recommend annual diabetic  eye exam  Discussed importance of wearing supportive diabetic shoes/socks.    encourage areobic exercise at least 30 mins a day/ 5 days a week   Hemoglobin A1C   Date Value Ref Range Status   03/07/2024 6.7 (A) 4.0 - 6.0 % Final     Hemoglobin A1c   Date Value Ref Range Status   07/04/2024 7.8 (H) <=7.0 %  Final   04/24/2024 6.7 <=7.0 % Final   06/19/2023 5.5 <=7.0 % Final       3. Depression, unspecified depression type  Continue current medication daily as prescribed.  Keep routine appointment with mental health provider Aguilar Anderson 15, 2024   Denies SI, HI hallucinations.    Establish good family/social support, reading, meditation, physical activity.  ED precautions discussed    4. Cognitive impairment  In the past was followed by neuro clinic Dr. Samuel dumont NP group.   attempt to get patient rescheduled with this group   5. Other amnesia  In the past was followed by neuro clinic Dr. Samuel dumont NP group.   attempt to get patient rescheduled with this group   6. Alcohol abuse  Stable   Continue   7. Mixed hyperlipidemia  Continue statin daily as prescribed.   Low fat, low cholesterol diet .  Increase dietary fiber  Avoid fried, fatty , high saturated fats.  Add flaxseed or fish oil omega supplement to diet .   exercise to reduce LDL and raise HDL. Exercise at least 30 mins a day as tolerated     Orders Placed This Encounter    CBC Auto Differential    Comprehensive Metabolic Panel    Lipid Panel    TSH    Hemoglobin A1C    Urinalysis    PSA, Screening    Microalbumin/Creatinine Ratio, Urine    Hepatitis C Antibody      Medication List with Changes/Refills   Current Medications    AMLODIPINE (NORVASC) 10 MG TABLET    TAKE 1 TABLET BY MOUTH DAILY    ATORVASTATIN (LIPITOR) 40 MG TABLET    Take 1 tablet (40 mg total) by mouth once daily.    BLOOD SUGAR DIAGNOSTIC STRP    1 each by Misc.(Non-Drug; Combo Route) route once daily.    CARBIDOPA-LEVODOPA  MG (SINEMET)  MG PER TABLET    Take 1 tablet by mouth 3 (three) times daily.    ESCITALOPRAM OXALATE (LEXAPRO) 20 MG TABLET    take one and one-half tablet by mouth DAILY    FOLIC ACID (FOLVITE) 1 MG TABLET    Take 1 tablet (1 mg total) by mouth once daily.    LEFLUNOMIDE (ARAVA) 10 MG TAB    TAKE 1 TABLET by mouth DAILY    MIRTAZAPINE (REMERON) 30 MG  TABLET    TAKE 1 TABLET by mouth DAILY AT BEDTIME for sleep    MOUNJARO 7.5 MG/0.5 ML PNIJ    INJECT 7.5MG subcutaneously every week    MULTIVIT41-IRON-FOLATE8-PS-DHA (ENBRACE HR) 1.5 MG IRON- 8.73 MG-6.4 MG CAPSULE    TAKE 1 CAPSULE by mouth Twice Daily    LBZXETVZ39/IRON/FOLATE8/PS-DHA (ENBRACE HR ORAL)    Take by mouth 2 (two) times a day.    OLMESARTAN-HYDROCHLOROTHIAZIDE (BENICAR HCT) 20-12.5 MG PER TABLET    TAKE 1 TABLET BY MOUTH DAILY    PROPRANOLOL (INDERAL) 10 MG TABLET    Take 1 tablet (10 mg total) by mouth 2 (two) times daily.    THIAMINE 100 MG TABLET    Take 1 tablet (100 mg total) by mouth once daily.    TIRZEPATIDE 10 MG/0.5 ML PNIJ    Inject 10 mg into the skin every 7 days.    VITAMIN B-1, MONONITRATE, 100 MG TAB    Take 100 mg by mouth once daily.    VITAMIN D3 50 MCG (2,000 UNIT) CAP CAPSULE    TAKE 1 CAPSULE by mouth EVERY EVENING    VRAYLAR 1.5 MG CAP    Take 1.5 mg by mouth every morning.       No follow-ups on file.   No follow-ups on file. In addition to their scheduled follow up, the patient has also been instructed to follow up on as needed basis.   Future Appointments   Date Time Provider Department Center   11/20/2024  2:30 PM Jama Martinez FNP [facility]

## 2024-10-17 DIAGNOSIS — M06.00 SERONEGATIVE RHEUMATOID ARTHRITIS: ICD-10-CM

## 2024-10-17 DIAGNOSIS — E55.9 VITAMIN D DEFICIENCY: ICD-10-CM

## 2024-10-17 DIAGNOSIS — F32.A DEPRESSION, UNSPECIFIED DEPRESSION TYPE: ICD-10-CM

## 2024-10-17 DIAGNOSIS — E11.9 TYPE 2 DIABETES MELLITUS WITHOUT COMPLICATIONS: ICD-10-CM

## 2024-10-18 RX ORDER — CARBIDOPA AND LEVODOPA 25; 100 MG/1; MG/1
1 TABLET ORAL 3 TIMES DAILY
Qty: 30 TABLET | Refills: 0 | Status: SHIPPED | OUTPATIENT
Start: 2024-10-18 | End: 2024-10-18

## 2024-10-18 RX ORDER — MIRTAZAPINE 30 MG/1
30 TABLET, FILM COATED ORAL NIGHTLY
Qty: 30 TABLET | Refills: 0 | Status: SHIPPED | OUTPATIENT
Start: 2024-10-18

## 2024-10-18 RX ORDER — ESCITALOPRAM OXALATE 20 MG/1
20 TABLET ORAL DAILY
Qty: 45 TABLET | Refills: 0 | Status: SHIPPED | OUTPATIENT
Start: 2024-10-18

## 2024-10-18 RX ORDER — LEFLUNOMIDE 10 MG/1
10 TABLET ORAL DAILY
Qty: 30 TABLET | Refills: 0 | Status: SHIPPED | OUTPATIENT
Start: 2024-10-18

## 2024-10-18 RX ORDER — LEVOMEFOLATE MAGNESIUM, LEUCOVORIN, FOLIC ACID, FERROUS CYSTEINE GLYCINATE, MAGNESIUM ASCORBATE, ZINC ASCORBATE, COCARBOXYLASE, FLAVIN ADENINE DINUCLEOTIDE, NADH, PYRIDOXAL PHOSPHATE ANHYDROUS, COBAMAMIDE, BETAINE, MAGNESIUM L-THREONATE, 1,2-DOCOSAHEXANOYL-SN-GLYCERO-3-PHOSPHOSERINE CALCIUM, 1,2-ICOSAPENTOYL-SN-GLYCERO-3-PHOSPHOSERINE CALCIUM, AND PHOSPHATIDYL SERINE 5.23; 2.5; 1; 13.6; 24; 1; 25; 25; 25; 25; 50; 500; 1; 6.4; 800; 12 MG/1; MG/1; MG/1; MG/1; MG/1; MG/1; UG/1; UG/1; UG/1; UG/1; UG/1; UG/1; MG/1; MG/1; UG/1; MG/1
1 CAPSULE, DELAYED RELEASE PELLETS ORAL 2 TIMES DAILY
Qty: 30 EACH | Refills: 0 | Status: SHIPPED | OUTPATIENT
Start: 2024-10-18 | End: 2024-10-18

## 2024-10-18 RX ORDER — TIRZEPATIDE 7.5 MG/.5ML
7.5 INJECTION, SOLUTION SUBCUTANEOUS WEEKLY
Qty: 2 ML | Refills: 0 | Status: SHIPPED | OUTPATIENT
Start: 2024-10-18 | End: 2024-10-18

## 2024-10-18 RX ORDER — CARBIDOPA AND LEVODOPA 25; 100 MG/1; MG/1
1 TABLET ORAL 3 TIMES DAILY
Qty: 90 TABLET | Refills: 0 | Status: SHIPPED | OUTPATIENT
Start: 2024-10-18

## 2024-10-18 RX ORDER — ESCITALOPRAM OXALATE 20 MG/1
20 TABLET ORAL DAILY
Qty: 45 TABLET | Refills: 0 | Status: SHIPPED | OUTPATIENT
Start: 2024-10-18 | End: 2024-10-18

## 2024-10-18 RX ORDER — MIRTAZAPINE 30 MG/1
30 TABLET, FILM COATED ORAL NIGHTLY
Qty: 30 TABLET | Refills: 0 | Status: SHIPPED | OUTPATIENT
Start: 2024-10-18 | End: 2024-10-18

## 2024-10-18 RX ORDER — ACETAMINOPHEN 500 MG
2000 TABLET ORAL NIGHTLY
Qty: 30 CAPSULE | Refills: 0 | Status: SHIPPED | OUTPATIENT
Start: 2024-10-18 | End: 2024-10-18

## 2024-10-18 RX ORDER — ACETAMINOPHEN 500 MG
2000 TABLET ORAL NIGHTLY
Qty: 30 CAPSULE | Refills: 0 | Status: SHIPPED | OUTPATIENT
Start: 2024-10-18

## 2024-10-18 RX ORDER — LEFLUNOMIDE 10 MG/1
10 TABLET ORAL DAILY
Qty: 30 TABLET | Refills: 0 | Status: SHIPPED | OUTPATIENT
Start: 2024-10-18 | End: 2024-10-18

## 2024-10-18 RX ORDER — TIRZEPATIDE 7.5 MG/.5ML
7.5 INJECTION, SOLUTION SUBCUTANEOUS WEEKLY
Qty: 2 ML | Refills: 0 | Status: SHIPPED | OUTPATIENT
Start: 2024-10-18

## 2024-10-18 RX ORDER — LEVOMEFOLATE MAGNESIUM, LEUCOVORIN, FOLIC ACID, FERROUS CYSTEINE GLYCINATE, MAGNESIUM ASCORBATE, ZINC ASCORBATE, COCARBOXYLASE, FLAVIN ADENINE DINUCLEOTIDE, NADH, PYRIDOXAL PHOSPHATE ANHYDROUS, COBAMAMIDE, BETAINE, MAGNESIUM L-THREONATE, 1,2-DOCOSAHEXANOYL-SN-GLYCERO-3-PHOSPHOSERINE CALCIUM, 1,2-ICOSAPENTOYL-SN-GLYCERO-3-PHOSPHOSERINE CALCIUM, AND PHOSPHATIDYL SERINE 5.23; 2.5; 1; 13.6; 24; 1; 25; 25; 25; 25; 50; 500; 1; 6.4; 800; 12 MG/1; MG/1; MG/1; MG/1; MG/1; MG/1; UG/1; UG/1; UG/1; UG/1; UG/1; UG/1; MG/1; MG/1; UG/1; MG/1
1 CAPSULE, DELAYED RELEASE PELLETS ORAL DAILY
Qty: 30 EACH | Refills: 0 | Status: SHIPPED | OUTPATIENT
Start: 2024-10-18

## 2024-11-02 ENCOUNTER — PATIENT MESSAGE (OUTPATIENT)
Dept: FAMILY MEDICINE | Facility: CLINIC | Age: 59
End: 2024-11-02
Payer: MEDICAID

## 2024-11-04 ENCOUNTER — TELEPHONE (OUTPATIENT)
Dept: FAMILY MEDICINE | Facility: CLINIC | Age: 59
End: 2024-11-04
Payer: MEDICAID

## 2024-11-08 ENCOUNTER — TELEPHONE (OUTPATIENT)
Dept: FAMILY MEDICINE | Facility: CLINIC | Age: 59
End: 2024-11-08
Payer: MEDICAID

## 2024-11-13 ENCOUNTER — TELEPHONE (OUTPATIENT)
Dept: FAMILY MEDICINE | Facility: CLINIC | Age: 59
End: 2024-11-13
Payer: MEDICAID

## 2024-11-14 ENCOUNTER — LAB VISIT (OUTPATIENT)
Dept: LAB | Facility: HOSPITAL | Age: 59
End: 2024-11-14
Attending: NURSE PRACTITIONER
Payer: MEDICAID

## 2024-11-14 DIAGNOSIS — Z76.89 ENCOUNTER TO ESTABLISH CARE: ICD-10-CM

## 2024-11-14 DIAGNOSIS — E11.9 TYPE 2 DIABETES MELLITUS WITHOUT COMPLICATION, WITHOUT LONG-TERM CURRENT USE OF INSULIN: ICD-10-CM

## 2024-11-14 LAB
ALBUMIN SERPL-MCNC: 4 G/DL (ref 3.4–4.8)
ALBUMIN/GLOB SERPL: 1.4 RATIO (ref 1.1–2)
ALP SERPL-CCNC: 94 UNIT/L (ref 40–150)
ALT SERPL-CCNC: 45 UNIT/L (ref 0–55)
ANION GAP SERPL CALC-SCNC: 6 MEQ/L
AST SERPL-CCNC: 24 UNIT/L (ref 5–34)
BASOPHILS # BLD AUTO: 0.06 X10(3)/MCL
BASOPHILS NFR BLD AUTO: 0.8 %
BILIRUB SERPL-MCNC: 0.8 MG/DL
BUN SERPL-MCNC: 13.8 MG/DL (ref 8.4–25.7)
CALCIUM SERPL-MCNC: 9.4 MG/DL (ref 8.8–10)
CHLORIDE SERPL-SCNC: 106 MMOL/L (ref 98–107)
CHOLEST SERPL-MCNC: 111 MG/DL
CHOLEST/HDLC SERPL: 3 {RATIO} (ref 0–5)
CO2 SERPL-SCNC: 25 MMOL/L (ref 23–31)
CREAT SERPL-MCNC: 0.92 MG/DL (ref 0.72–1.25)
CREAT UR-MCNC: 237.1 MG/DL (ref 63–166)
CREAT/UREA NIT SERPL: 15
EOSINOPHIL # BLD AUTO: 0.24 X10(3)/MCL (ref 0–0.9)
EOSINOPHIL NFR BLD AUTO: 3.3 %
ERYTHROCYTE [DISTWIDTH] IN BLOOD BY AUTOMATED COUNT: 12.9 % (ref 11.5–17)
EST. AVERAGE GLUCOSE BLD GHB EST-MCNC: 134.1 MG/DL
GFR SERPLBLD CREATININE-BSD FMLA CKD-EPI: >60 ML/MIN/1.73/M2
GLOBULIN SER-MCNC: 2.8 GM/DL (ref 2.4–3.5)
GLUCOSE SERPL-MCNC: 125 MG/DL (ref 82–115)
HBA1C MFR BLD: 6.3 %
HCT VFR BLD AUTO: 44.2 % (ref 42–52)
HCV AB SERPL QL IA: NONREACTIVE
HDLC SERPL-MCNC: 41 MG/DL (ref 35–60)
HGB BLD-MCNC: 15 G/DL (ref 14–18)
IMM GRANULOCYTES # BLD AUTO: 0.03 X10(3)/MCL (ref 0–0.04)
IMM GRANULOCYTES NFR BLD AUTO: 0.4 %
LDLC SERPL CALC-MCNC: 58 MG/DL (ref 50–140)
LEFT EYE DM RETINOPATHY: NEGATIVE
LYMPHOCYTES # BLD AUTO: 1.34 X10(3)/MCL (ref 0.6–4.6)
LYMPHOCYTES NFR BLD AUTO: 18.7 %
MCH RBC QN AUTO: 30.5 PG (ref 27–31)
MCHC RBC AUTO-ENTMCNC: 33.9 G/DL (ref 33–36)
MCV RBC AUTO: 89.8 FL (ref 80–94)
MICROALBUMIN UR-MCNC: 18.2 UG/ML
MICROALBUMIN/CREAT RATIO PNL UR: 7.7 MG/GM CR (ref 0–30)
MONOCYTES # BLD AUTO: 0.68 X10(3)/MCL (ref 0.1–1.3)
MONOCYTES NFR BLD AUTO: 9.5 %
NEUTROPHILS # BLD AUTO: 4.83 X10(3)/MCL (ref 2.1–9.2)
NEUTROPHILS NFR BLD AUTO: 67.3 %
NRBC BLD AUTO-RTO: 0 %
PLATELET # BLD AUTO: 207 X10(3)/MCL (ref 130–400)
PMV BLD AUTO: 10.3 FL (ref 7.4–10.4)
POTASSIUM SERPL-SCNC: 4.1 MMOL/L (ref 3.5–5.1)
PROT SERPL-MCNC: 6.8 GM/DL (ref 5.8–7.6)
PSA SERPL-MCNC: 0.92 NG/ML
RBC # BLD AUTO: 4.92 X10(6)/MCL (ref 4.7–6.1)
RIGHT EYE DM RETINOPATHY: NEGATIVE
SODIUM SERPL-SCNC: 137 MMOL/L (ref 136–145)
TRIGL SERPL-MCNC: 62 MG/DL (ref 34–140)
TSH SERPL-ACNC: 3.65 UIU/ML (ref 0.35–4.94)
VLDLC SERPL CALC-MCNC: 12 MG/DL
WBC # BLD AUTO: 7.18 X10(3)/MCL (ref 4.5–11.5)

## 2024-11-14 PROCEDURE — 80061 LIPID PANEL: CPT

## 2024-11-14 PROCEDURE — 36415 COLL VENOUS BLD VENIPUNCTURE: CPT

## 2024-11-14 PROCEDURE — 83036 HEMOGLOBIN GLYCOSYLATED A1C: CPT

## 2024-11-14 PROCEDURE — 84153 ASSAY OF PSA TOTAL: CPT

## 2024-11-14 PROCEDURE — 84443 ASSAY THYROID STIM HORMONE: CPT

## 2024-11-14 PROCEDURE — 80053 COMPREHEN METABOLIC PANEL: CPT

## 2024-11-14 PROCEDURE — 86803 HEPATITIS C AB TEST: CPT

## 2024-11-14 PROCEDURE — 85025 COMPLETE CBC W/AUTO DIFF WBC: CPT

## 2024-11-14 PROCEDURE — 82570 ASSAY OF URINE CREATININE: CPT

## 2024-11-15 DIAGNOSIS — E11.9 TYPE 2 DIABETES MELLITUS WITHOUT COMPLICATIONS: ICD-10-CM

## 2024-11-15 DIAGNOSIS — M06.00 SERONEGATIVE RHEUMATOID ARTHRITIS: ICD-10-CM

## 2024-11-15 DIAGNOSIS — E55.9 VITAMIN D DEFICIENCY: ICD-10-CM

## 2024-11-15 DIAGNOSIS — R41.89 COGNITIVE IMPAIRMENT: Primary | ICD-10-CM

## 2024-11-15 RX ORDER — TIRZEPATIDE 7.5 MG/.5ML
7.5 INJECTION, SOLUTION SUBCUTANEOUS WEEKLY
Qty: 2 ML | Refills: 2 | Status: SHIPPED | OUTPATIENT
Start: 2024-11-15

## 2024-11-15 RX ORDER — LEFLUNOMIDE 10 MG/1
10 TABLET ORAL DAILY
Qty: 30 TABLET | Refills: 0 | Status: SHIPPED | OUTPATIENT
Start: 2024-11-15

## 2024-11-15 RX ORDER — ACETAMINOPHEN 500 MG
2000 TABLET ORAL NIGHTLY
Qty: 30 CAPSULE | Refills: 0 | Status: SHIPPED | OUTPATIENT
Start: 2024-11-15

## 2024-11-15 RX ORDER — CARBIDOPA AND LEVODOPA 25; 100 MG/1; MG/1
1 TABLET ORAL 3 TIMES DAILY
Qty: 90 TABLET | Refills: 0 | Status: SHIPPED | OUTPATIENT
Start: 2024-11-15

## 2024-11-18 ENCOUNTER — PATIENT MESSAGE (OUTPATIENT)
Dept: FAMILY MEDICINE | Facility: CLINIC | Age: 59
End: 2024-11-18
Payer: MEDICAID

## 2024-11-21 ENCOUNTER — OFFICE VISIT (OUTPATIENT)
Dept: FAMILY MEDICINE | Facility: CLINIC | Age: 59
End: 2024-11-21
Payer: MEDICAID

## 2024-11-21 VITALS
OXYGEN SATURATION: 98 % | SYSTOLIC BLOOD PRESSURE: 115 MMHG | HEIGHT: 72 IN | DIASTOLIC BLOOD PRESSURE: 82 MMHG | RESPIRATION RATE: 17 BRPM | BODY MASS INDEX: 29.24 KG/M2 | WEIGHT: 215.88 LBS | HEART RATE: 91 BPM | TEMPERATURE: 98 F

## 2024-11-21 DIAGNOSIS — Z00.00 WELLNESS EXAMINATION: Primary | ICD-10-CM

## 2024-11-21 DIAGNOSIS — R41.3 OTHER AMNESIA: ICD-10-CM

## 2024-11-21 DIAGNOSIS — F32.A DEPRESSION, UNSPECIFIED DEPRESSION TYPE: ICD-10-CM

## 2024-11-21 DIAGNOSIS — E78.2 MIXED HYPERLIPIDEMIA: ICD-10-CM

## 2024-11-21 DIAGNOSIS — M06.00 SERONEGATIVE RHEUMATOID ARTHRITIS: ICD-10-CM

## 2024-11-21 DIAGNOSIS — E11.9 TYPE 2 DIABETES MELLITUS WITHOUT COMPLICATION, WITHOUT LONG-TERM CURRENT USE OF INSULIN: ICD-10-CM

## 2024-11-21 DIAGNOSIS — R41.89 COGNITIVE IMPAIRMENT: ICD-10-CM

## 2024-11-21 PROCEDURE — 3061F NEG MICROALBUMINURIA REV: CPT | Mod: CPTII,,, | Performed by: NURSE PRACTITIONER

## 2024-11-21 PROCEDURE — 1160F RVW MEDS BY RX/DR IN RCRD: CPT | Mod: CPTII,,, | Performed by: NURSE PRACTITIONER

## 2024-11-21 PROCEDURE — 3066F NEPHROPATHY DOC TX: CPT | Mod: CPTII,,, | Performed by: NURSE PRACTITIONER

## 2024-11-21 PROCEDURE — 3008F BODY MASS INDEX DOCD: CPT | Mod: CPTII,,, | Performed by: NURSE PRACTITIONER

## 2024-11-21 PROCEDURE — 99396 PREV VISIT EST AGE 40-64: CPT | Mod: ,,, | Performed by: NURSE PRACTITIONER

## 2024-11-21 PROCEDURE — 1159F MED LIST DOCD IN RCRD: CPT | Mod: CPTII,,, | Performed by: NURSE PRACTITIONER

## 2024-11-21 PROCEDURE — 3079F DIAST BP 80-89 MM HG: CPT | Mod: CPTII,,, | Performed by: NURSE PRACTITIONER

## 2024-11-21 PROCEDURE — 3044F HG A1C LEVEL LT 7.0%: CPT | Mod: CPTII,,, | Performed by: NURSE PRACTITIONER

## 2024-11-21 PROCEDURE — 3074F SYST BP LT 130 MM HG: CPT | Mod: CPTII,,, | Performed by: NURSE PRACTITIONER

## 2024-11-21 NOTE — PROGRESS NOTES
SUBJECTIVE:     History of Present Illness      Chief Complaint: Annual Exam (Here for wellness exam, discuss lab results.  C/O low energy level.  C/O memory loss, has appt with Dr Llanes 06/12/25 8:00am.)    HPI:  Patient is a 58 y.o. year old male who presents to clinic for annual wellness and lab review.  Patient is present with his son  past medical history of diabetes mellitus, depression, hyperlipidemia, alcohol abuse.  Patient is currently living in assisted living [facility].  Previously he was in sober living rehab for history of alcohol abuse.  It does appear the patient has a MRI of the brain September 23 with chronic microvascular ischemic changes.  He reports memory loss and confusion.    Patient and family reports  memory decline over the last several years 3-4 years in the past he did follow up with Dr. Cardozo / [facility] team  for memory loss.  Recent referral has been sent to [facility] due to new insurance .He continues to have tremors and memory loss.  Started on carbidopa levodopa after last emergency room stay in August for Wernicke  encephalopathy    Patient has establish care with his mental health provider  psychiatrist Adwoa Sin NP.    Denies SI, HI hallucinations.      Review of Systems:    Review of Systems    12 point review of systems conducted, negative except as stated in the history of present illness. See HPI for details.     Previous History    Jama Martinez, BEENAP  Review of patient's allergies indicates:  No Known Allergies    Past Medical History:   Diagnosis Date    ADHD (attention deficit hyperactivity disorder)     Benign essential hypertension 09/12/2022    Depression 09/12/2022    Hyperlipemia 09/12/2022    Microalbuminuria     Musculoskeletal pain     Obesity, unspecified     Personal history of colonic polyps 05/18/2021    Dr Christianne Reeder    Seronegative rheumatoid arthritis     Type 2 diabetes mellitus without complication, without long-term current use of insulin  09/12/2022    Unspecified cataract     Vitamin B12 deficiency      Current Outpatient Medications   Medication Instructions    amLODIPine (NORVASC) 10 mg, Oral    atorvastatin (LIPITOR) 40 mg, Oral, Daily    blood sugar diagnostic Strp 1 each, Misc.(Non-Drug; Combo Route), Daily    carbidopa-levodopa  mg (SINEMET)  mg per tablet 1 tablet, Oral, 3 times daily    cholecalciferol (vitamin D3) (VITAMIN D3) 2,000 Units, Oral, Nightly    EScitalopram oxalate (LEXAPRO) 20 mg, Oral, Daily    folic acid (FOLVITE) 1 mg, Oral, Daily    leflunomide (ARAVA) 10 mg, Oral, Daily    mirtazapine (REMERON) 30 mg, Oral, Nightly    MOUNJARO 7.5 mg, Subcutaneous, Weekly    multivit41-iron-folate8-ps-dha (ENBRACE HR) 1.5 mg iron- 8.73 mg-6.4 mg capsule 1 capsule, Oral, 2 times daily    multivit41-iron-folate8-ps-dha (ENBRACE HR) 1.5 mg iron- 8.73 mg-6.4 mg capsule 1 capsule, Oral, Daily    olmesartan-hydrochlorothiazide (BENICAR HCT) 20-12.5 mg per tablet 1 tablet, Oral    propranoloL (INDERAL) 10 mg, Oral, 2 times daily    thiamine 100 mg, Oral, Daily    tirzepatide 10 mg, Subcutaneous, Every 7 days    VITAMIN B-1 (MONONITRATE) 100 mg, Daily    VRAYLAR 1.5 mg, Every morning     Past Surgical History:   Procedure Laterality Date    Arthroscopic repair medial meniscus  10/15/2019    CATARACT EXTRACTION Right 05/26/2020    CATARACT EXTRACTION Left 11/10/2020    CHOLECYSTECTOMY      COLONOSCOPY W/ POLYPECTOMY  05/18/2021    Dr Christianne Reeder     Family History   Problem Relation Name Age of Onset    Diabetes Mother      Hypertension Father      Dementia Father      Diabetes Sister      Depression Sister         Social History     Tobacco Use    Smoking status: Every Day     Current packs/day: 1.00     Types: Cigarettes    Smokeless tobacco: Current    Tobacco comments:     Patient would like to quit smoking    Substance Use Topics    Alcohol use: Not Currently     Alcohol/week: 42.0 standard drinks of alcohol     Types: 42 Cans of  beer per week     Comment: Nightly    Drug use: Never        Health Maintenance      Health Maintenance   Topic Date Due    Foot Exam  Never done    Shingles Vaccine (2 of 3) 10/08/2018    Eye Exam  04/23/2025    Hemoglobin A1c  05/14/2025    Lipid Panel  11/14/2025    Low Dose Statin  11/21/2025    Colorectal Cancer Screening  05/18/2026    TETANUS VACCINE  07/13/2032    Hepatitis C Screening  Completed       OBJECTIVE:     Physical Exam      Vital Signs Reviewed   Visit Vitals  /82 (BP Location: Left arm, Patient Position: Sitting)   Pulse 91   Temp 97.7 °F (36.5 °C) (Oral)   Resp 17   Ht 6' (1.829 m)   Wt 97.9 kg (215 lb 14.4 oz)   SpO2 98%   BMI 29.28 kg/m²       Physical Exam    Physical Exam:  General: Alert, well nourished, no acute distress, non-toxic appearing.   Eyes: Anicteric sclera, without conjunctival injection, normal lids, no purulent drainage, EOMs grossly intact.   Ears: No tragal tenderness. Tympanic membranes intact, pearly grey, without effusion or erythema and with a positive light reflex.   Mouth: Posterior pharynx without erythema. No exudate, ulcerations, or lesion. No tonsillar swelling.   Neck: Supple, full ROM, no rigidity, no cervical adenopathy.   Cardio: Normal rate and rhythm    Resp: Respirations even and unlabored, clear to auscultation bilaterally.   Abd: No ecchymosis or distension. Normal bowel sounds in all 4 quadrants. No tenderness to palpation. No rebound tenderness or guarding. No CVA tenderness.   Skin: No rashes or open lesions noted.   MSK: No swelling. No abrasions or signs of trauma. Ambulating without assistance.   Neuro: Alert,oriented No focal deficits noted. Facial expressions even.   Psych: Cooperative, Normal affect        Labs     Results for orders placed or performed in visit on 11/14/24   Comprehensive Metabolic Panel    Collection Time: 11/14/24  8:10 AM   Result Value Ref Range    Sodium 137 136 - 145 mmol/L    Potassium 4.1 3.5 - 5.1 mmol/L     Chloride 106 98 - 107 mmol/L    CO2 25 23 - 31 mmol/L    Glucose 125 (H) 82 - 115 mg/dL    Blood Urea Nitrogen 13.8 8.4 - 25.7 mg/dL    Creatinine 0.92 0.72 - 1.25 mg/dL    Calcium 9.4 8.8 - 10.0 mg/dL    Protein Total 6.8 5.8 - 7.6 gm/dL    Albumin 4.0 3.4 - 4.8 g/dL    Globulin 2.8 2.4 - 3.5 gm/dL    Albumin/Globulin Ratio 1.4 1.1 - 2.0 ratio    Bilirubin Total 0.8 <=1.5 mg/dL    ALP 94 40 - 150 unit/L    ALT 45 0 - 55 unit/L    AST 24 5 - 34 unit/L    eGFR >60 mL/min/1.73/m2    Anion Gap 6.0 mEq/L    BUN/Creatinine Ratio 15    Lipid Panel    Collection Time: 11/14/24  8:10 AM   Result Value Ref Range    Cholesterol Total 111 <=200 mg/dL    HDL Cholesterol 41 35 - 60 mg/dL    Triglyceride 62 34 - 140 mg/dL    Cholesterol/HDL Ratio 3 0 - 5    Very Low Density Lipoprotein 12     LDL Cholesterol 58.00 50.00 - 140.00 mg/dL   TSH    Collection Time: 11/14/24  8:10 AM   Result Value Ref Range    TSH 3.652 0.350 - 4.940 uIU/mL   Hemoglobin A1C    Collection Time: 11/14/24  8:10 AM   Result Value Ref Range    Hemoglobin A1c 6.3 <=7.0 %    Estimated Average Glucose 134.1 mg/dL   PSA, Screening    Collection Time: 11/14/24  8:10 AM   Result Value Ref Range    Prostate Specific Antigen 0.92 <=4.00 ng/mL   Microalbumin/Creatinine Ratio, Urine    Collection Time: 11/14/24  8:10 AM   Result Value Ref Range    Urine Microalbumin 18.2 <=30.0 ug/mL    Urine Creatinine 237.1 (H) 63.0 - 166.0 mg/dL    Microalbumin Creatinine Ratio 7.7 0.0 - 30.0 mg/gm Cr   Hepatitis C Antibody    Collection Time: 11/14/24  8:10 AM   Result Value Ref Range    Hep C Ab Interp Nonreactive Nonreactive   CBC with Differential    Collection Time: 11/14/24  8:10 AM   Result Value Ref Range    WBC 7.18 4.50 - 11.50 x10(3)/mcL    RBC 4.92 4.70 - 6.10 x10(6)/mcL    Hgb 15.0 14.0 - 18.0 g/dL    Hct 44.2 42.0 - 52.0 %    MCV 89.8 80.0 - 94.0 fL    MCH 30.5 27.0 - 31.0 pg    MCHC 33.9 33.0 - 36.0 g/dL    RDW 12.9 11.5 - 17.0 %    Platelet 207 130 - 400  x10(3)/mcL    MPV 10.3 7.4 - 10.4 fL    Neut % 67.3 %    Lymph % 18.7 %    Mono % 9.5 %    Eos % 3.3 %    Basophil % 0.8 %    Lymph # 1.34 0.6 - 4.6 x10(3)/mcL    Neut # 4.83 2.1 - 9.2 x10(3)/mcL    Mono # 0.68 0.1 - 1.3 x10(3)/mcL    Eos # 0.24 0 - 0.9 x10(3)/mcL    Baso # 0.06 <=0.2 x10(3)/mcL    IG# 0.03 0 - 0.04 x10(3)/mcL    IG% 0.4 %    NRBC% 0.0 %       Chemistry:  Lab Results   Component Value Date     11/14/2024    K 4.1 11/14/2024    BUN 13.8 11/14/2024    CREATININE 0.92 11/14/2024    EGFRNORACEVR >60 11/14/2024    GLUCOSE 125 (H) 11/14/2024    CALCIUM 9.4 11/14/2024    ALKPHOS 94 11/14/2024    LABPROT 6.8 11/14/2024    ALBUMIN 4.0 11/14/2024    BILIDIR 0.4 05/18/2021    IBILI 0.50 05/18/2021    AST 24 11/14/2024    ALT 45 11/14/2024    ATMRMGEW59ZI 36.5 04/24/2024    TSH 3.652 11/14/2024    ASWGEQ1DRPK 0.94 08/30/2023    PSA 0.92 11/14/2024        Lab Results   Component Value Date    HGBA1C 6.3 11/14/2024        Hematology:  Lab Results   Component Value Date    WBC 7.18 11/14/2024    HGB 15.0 11/14/2024    HCT 44.2 11/14/2024     11/14/2024       Lipid Panel:  Lab Results   Component Value Date    CHOL 111 11/14/2024    HDL 41 11/14/2024    LDL 58.00 11/14/2024    TRIG 62 11/14/2024    TOTALCHOLEST 3 11/14/2024        Urine:  Lab Results   Component Value Date    APPEARANCEUA Clear 11/14/2024    SGUA 1.025 11/14/2024    PROTEINUA Negative 11/14/2024    KETONESUA Negative 11/14/2024    LEUKOCYTESUR Negative 11/14/2024    RBCUA 0-5 11/14/2024    WBCUA 0-5 11/14/2024    BACTERIA None Seen 11/14/2024    SQEPUA None Seen 11/14/2024    CREATRANDUR 237.1 (H) 11/14/2024         Assessment            ICD-10-CM ICD-9-CM   1. Wellness examination  Z00.00 V70.0   2. Cognitive impairment  R41.89 294.9   3. Depression, unspecified depression type  F32.A 311   4. Seronegative rheumatoid arthritis  M06.00 714.0   5. Other amnesia  R41.3 780.93   6. Type 2 diabetes mellitus without complication, without  long-term current use of insulin  E11.9 250.00   7. Mixed hyperlipidemia  E78.2 272.2       Plan       1. Wellness examination  Discussed labs and preventative screenings   Overall health status reviewed.    Significant chronic conditions addressed, including ongoing treatment plans.   Good health habits reinforced.    Cardiovascular disease risk factors discussed.   Appropriate recommendations and preventative care medical   information provided with annual wellness exams encouraged.  Vaccination status   Mammo  Colon  PSA- WNL repeat in a year   Cervical     2. Cognitive impairment  Pending neurology referral  3. Depression, unspecified depression type  Stable continue current medication and routine appointment with mental health provider.   Denies SI, HI hallucinations.    Establish good family/social support, reading, meditation, physical activity.  ED precautions discussed    4. Seronegative rheumatoid arthritis  Continue Arava   Needs new Ra doctor   Referral to rheumatology  5. Other amnesia  Pending neurology referral      6. Type 2 diabetes mellitus without complication, without long-term current use of insulin   Well-controlled continue current medication Mounjaro   Continue Current medication as prescribed.    Low carb low sugar diet.   Follow ADA, examine feet daily.   Recommend annual diabetic  eye exam  Discussed importance of wearing supportive diabetic shoes/socks.    encourage areobic exercise at least 30 mins a day/ 5 days a week     Hemoglobin A1c   Date Value Ref Range Status   11/14/2024 6.3 <=7.0 % Final   07/04/2024 7.8 (H) <=7.0 % Final   04/24/2024 6.7 <=7.0 % Final        7. Mixed hyperlipidemia    Continue statin daily as prescribed.   Low fat, low cholesterol diet .  Increase dietary fiber  Avoid fried, fatty , high saturated fats.  Add flaxseed or fish oil omega supplement to diet .   exercise to reduce LDL and raise HDL. Exercise at least 30 mins a day as tolerated        Medication  List with Changes/Refills   Current Medications    AMLODIPINE (NORVASC) 10 MG TABLET    TAKE 1 TABLET BY MOUTH DAILY    ATORVASTATIN (LIPITOR) 40 MG TABLET    Take 1 tablet (40 mg total) by mouth once daily.    BLOOD SUGAR DIAGNOSTIC STRP    1 each by Misc.(Non-Drug; Combo Route) route once daily.    CARBIDOPA-LEVODOPA  MG (SINEMET)  MG PER TABLET    Take 1 tablet by mouth 3 (three) times daily.    CHOLECALCIFEROL, VITAMIN D3, (VITAMIN D3) 50 MCG (2,000 UNIT) CAP CAPSULE    Take 1 capsule (2,000 Units total) by mouth every evening.    ESCITALOPRAM OXALATE (LEXAPRO) 20 MG TABLET    Take 1 tablet (20 mg total) by mouth once daily.    FOLIC ACID (FOLVITE) 1 MG TABLET    Take 1 tablet (1 mg total) by mouth once daily.    LEFLUNOMIDE (ARAVA) 10 MG TAB    Take 1 tablet (10 mg total) by mouth once daily.    MIRTAZAPINE (REMERON) 30 MG TABLET    Take 1 tablet (30 mg total) by mouth every evening.    MULTIVIT41-IRON-FOLATE8-PS-DHA (ENBRACE HR) 1.5 MG IRON- 8.73 MG-6.4 MG CAPSULE    TAKE 1 CAPSULE by mouth Twice Daily    MULTIVIT41-IRON-FOLATE8-PS-DHA (ENBRACE HR) 1.5 MG IRON- 8.73 MG-6.4 MG CAPSULE    Take 1 capsule by mouth once daily.    OLMESARTAN-HYDROCHLOROTHIAZIDE (BENICAR HCT) 20-12.5 MG PER TABLET    TAKE 1 TABLET BY MOUTH DAILY    PROPRANOLOL (INDERAL) 10 MG TABLET    Take 1 tablet (10 mg total) by mouth 2 (two) times daily.    THIAMINE 100 MG TABLET    Take 1 tablet (100 mg total) by mouth once daily.    TIRZEPATIDE (MOUNJARO) 7.5 MG/0.5 ML PNIJ    Inject 7.5 mg into the skin once a week.    TIRZEPATIDE 10 MG/0.5 ML PNIJ    Inject 10 mg into the skin every 7 days.    VITAMIN B-1, MONONITRATE, 100 MG TAB    Take 100 mg by mouth once daily.    VRAYLAR 1.5 MG CAP    Take 1.5 mg by mouth every morning.       Follow up in about 2 months (around 1/21/2025).   Follow up in about 2 months (around 1/21/2025). In addition to their scheduled follow up, the patient has also been instructed to follow up on as needed  basis.   Future Appointments   Date Time Provider Department Center   1/21/2025  8:45 AM Jama Martinez FNP [facility]   6/12/2025  8:00 AM Margot Llanes MD [facility] NEURO [facility]   12/2/2025  9:30 AM Jama Martinez FNP [facility]

## 2024-12-13 ENCOUNTER — PATIENT MESSAGE (OUTPATIENT)
Dept: FAMILY MEDICINE | Facility: CLINIC | Age: 59
End: 2024-12-13
Payer: MEDICAID

## 2024-12-16 DIAGNOSIS — M06.00 SERONEGATIVE RHEUMATOID ARTHRITIS: ICD-10-CM

## 2024-12-16 DIAGNOSIS — R41.89 COGNITIVE IMPAIRMENT: ICD-10-CM

## 2024-12-16 RX ORDER — CARBIDOPA AND LEVODOPA 25; 100 MG/1; MG/1
1 TABLET ORAL 3 TIMES DAILY
Qty: 90 TABLET | Refills: 0 | Status: SHIPPED | OUTPATIENT
Start: 2024-12-16

## 2024-12-16 RX ORDER — LEFLUNOMIDE 10 MG/1
10 TABLET ORAL DAILY
Qty: 30 TABLET | Refills: 0 | Status: SHIPPED | OUTPATIENT
Start: 2024-12-16

## 2024-12-18 ENCOUNTER — DOCUMENTATION ONLY (OUTPATIENT)
Dept: FAMILY MEDICINE | Facility: CLINIC | Age: 59
End: 2024-12-18
Payer: MEDICAID

## 2025-01-15 DIAGNOSIS — M06.00 SERONEGATIVE RHEUMATOID ARTHRITIS: ICD-10-CM

## 2025-01-15 DIAGNOSIS — R41.89 COGNITIVE IMPAIRMENT: ICD-10-CM

## 2025-01-15 DIAGNOSIS — I10 BENIGN ESSENTIAL HYPERTENSION: ICD-10-CM

## 2025-01-15 RX ORDER — CARBIDOPA AND LEVODOPA 25; 100 MG/1; MG/1
1 TABLET ORAL 3 TIMES DAILY
Qty: 90 TABLET | Refills: 2 | Status: SHIPPED | OUTPATIENT
Start: 2025-01-15

## 2025-01-15 RX ORDER — LEFLUNOMIDE 10 MG/1
10 TABLET ORAL DAILY
Qty: 30 TABLET | Refills: 2 | Status: SHIPPED | OUTPATIENT
Start: 2025-01-15

## 2025-01-15 RX ORDER — AMLODIPINE BESYLATE 10 MG/1
10 TABLET ORAL DAILY
Qty: 30 TABLET | Refills: 2 | Status: SHIPPED | OUTPATIENT
Start: 2025-01-15

## 2025-01-29 ENCOUNTER — OFFICE VISIT (OUTPATIENT)
Dept: FAMILY MEDICINE | Facility: CLINIC | Age: 60
End: 2025-01-29
Payer: MEDICAID

## 2025-01-29 VITALS
OXYGEN SATURATION: 99 % | TEMPERATURE: 98 F | SYSTOLIC BLOOD PRESSURE: 127 MMHG | BODY MASS INDEX: 29.53 KG/M2 | RESPIRATION RATE: 16 BRPM | WEIGHT: 218 LBS | HEART RATE: 78 BPM | HEIGHT: 72 IN | DIASTOLIC BLOOD PRESSURE: 86 MMHG

## 2025-01-29 DIAGNOSIS — R41.89 COGNITIVE IMPAIRMENT: Primary | ICD-10-CM

## 2025-01-29 DIAGNOSIS — M06.00 SERONEGATIVE RHEUMATOID ARTHRITIS: ICD-10-CM

## 2025-01-29 DIAGNOSIS — R25.1 TREMORS OF NERVOUS SYSTEM: ICD-10-CM

## 2025-01-29 PROCEDURE — 1159F MED LIST DOCD IN RCRD: CPT | Mod: CPTII,,, | Performed by: NURSE PRACTITIONER

## 2025-01-29 PROCEDURE — 3074F SYST BP LT 130 MM HG: CPT | Mod: CPTII,,, | Performed by: NURSE PRACTITIONER

## 2025-01-29 PROCEDURE — 3079F DIAST BP 80-89 MM HG: CPT | Mod: CPTII,,, | Performed by: NURSE PRACTITIONER

## 2025-01-29 PROCEDURE — 99213 OFFICE O/P EST LOW 20 MIN: CPT | Mod: ,,, | Performed by: NURSE PRACTITIONER

## 2025-01-29 PROCEDURE — 3008F BODY MASS INDEX DOCD: CPT | Mod: CPTII,,, | Performed by: NURSE PRACTITIONER

## 2025-01-29 NOTE — Clinical Note
This  is the patient that was previously saw  Dr. Cardozo / [facility].  His family has been requesting for him to seen by neurologist sooner he is now scheduled to see [facility] neuro clinic.  But his appointment is not until June. Please help .

## 2025-01-31 NOTE — PROGRESS NOTES
SUBJECTIVE:     History of Present Illness      Chief Complaint: Follow-up (2 month follow up visit.  C/O continued memory loss, has appt with neuro on 06/12/2025.  Having persistent tremors, worsening.)    HPI:  Patient is a 59 y.o. year old male who presents to clinic for follow-up.  Patient continues to have memory loss.  Tremors are  persistent despite taking carbidopa levodopa.  Review of Systems:    Review of Systems    12 point review of systems conducted, negative except as stated in the history of present illness. See HPI for details.     Previous History      PCP: Jama Martinez FNP  Review of patient's allergies indicates:  No Known Allergies    Past Medical History:   Diagnosis Date    ADHD (attention deficit hyperactivity disorder)     Benign essential hypertension 09/12/2022    Depression 09/12/2022    Hyperlipemia 09/12/2022    Microalbuminuria     Musculoskeletal pain     Obesity, unspecified     Personal history of colonic polyps 05/18/2021    Dr Christianne Reeder    Seronegative rheumatoid arthritis     Type 2 diabetes mellitus without complication, without long-term current use of insulin 09/12/2022    Unspecified cataract     Vitamin B12 deficiency        Past Surgical History:   Procedure Laterality Date    Arthroscopic repair medial meniscus  10/15/2019    CATARACT EXTRACTION Right 05/26/2020    CATARACT EXTRACTION Left 11/10/2020    CHOLECYSTECTOMY      COLONOSCOPY W/ POLYPECTOMY  05/18/2021    Dr Christianne Reeder     Family History   Problem Relation Name Age of Onset    Diabetes Mother      Hypertension Father      Dementia Father      Diabetes Sister      Depression Sister         Social History     Tobacco Use    Smoking status: Every Day     Current packs/day: 1.00     Types: Cigarettes    Smokeless tobacco: Current    Tobacco comments:     Patient would like to quit smoking    Substance Use Topics    Alcohol use: Not Currently     Alcohol/week: 42.0 standard drinks of alcohol     Types: 42 Cans  of beer per week     Comment: Nightly    Drug use: Never        Health Maintenance      Health Maintenance   Topic Date Due    Foot Exam  Never done    HIV Screening  Never done    Pneumococcal Vaccines (Age 50+) (2 of 2 - PPSV23) 04/28/2016    Shingles Vaccine (2 of 3) 10/08/2018    RSV Vaccine (Age 60+ and Pregnant patients) (1 - Risk 60-74 years 1-dose series) Never done    Influenza Vaccine (1) 09/01/2024    COVID-19 Vaccine (7 - 2024-25 season) 09/01/2024    Hemoglobin A1c  05/14/2025    Diabetes Urine Screening  11/14/2025    Lipid Panel  11/14/2025    Diabetic Eye Exam  11/14/2025    Low Dose Statin  01/29/2026    Colorectal Cancer Screening  05/18/2026    TETANUS VACCINE  07/13/2032    Hepatitis C Screening  Completed       OBJECTIVE:     Physical Exam      Vital Signs Reviewed   Visit Vitals  /86 (BP Location: Left arm, Patient Position: Sitting)   Pulse 78   Temp 97.6 °F (36.4 °C) (Oral)   Resp 16   Ht 6' (1.829 m)   Wt 98.9 kg (218 lb)   SpO2 99%   BMI 29.57 kg/m²       Physical Exam    Physical Exam:  General: Alert, well nourished, no acute distress, non-toxic appearing.   Eyes: Anicteric sclera, without conjunctival injection, normal lids, no purulent drainage, EOMs grossly intact.   Ears: No tragal tenderness. Tympanic membranes intact, pearly grey, without effusion or erythema and with a positive light reflex.   Mouth: Posterior pharynx without erythema. No exudate, ulcerations, or lesion. No tonsillar swelling.   Neck: Supple, full ROM, no rigidity, no cervical adenopathy.   Cardio: Normal rate and rhythm    Resp: Respirations even and unlabored, clear to auscultation bilaterally.   Abd: No ecchymosis or distension. Normal bowel sounds in all 4 quadrants. No tenderness to palpation. No rebound tenderness or guarding. No CVA tenderness.   Skin: No rashes or open lesions noted.   MSK: No swelling. No abrasions or signs of trauma. Ambulating without assistance.   Neuro: Alert,oriented No focal  deficits noted. Facial expressions even.   Psych: Cooperative, Normal affect      Labs   Chemistry:  Lab Results   Component Value Date     11/14/2024    K 4.1 11/14/2024    BUN 13.8 11/14/2024    CREATININE 0.92 11/14/2024    EGFRNORACEVR >60 11/14/2024    GLUCOSE 125 (H) 11/14/2024    CALCIUM 9.4 11/14/2024    ALKPHOS 94 11/14/2024    LABPROT 6.8 11/14/2024    ALBUMIN 4.0 11/14/2024    BILIDIR 0.4 05/18/2021    IBILI 0.50 05/18/2021    AST 24 11/14/2024    ALT 45 11/14/2024    [id number] 36.5 04/24/2024    TSH 3.652 11/14/2024    XSJRJJ8IOCD 0.94 08/30/2023    PSA 0.92 11/14/2024        Lab Results   Component Value Date    HGBA1C 6.3 11/14/2024        Hematology:  Lab Results   Component Value Date    WBC 7.18 11/14/2024    HGB 15.0 11/14/2024    HCT 44.2 11/14/2024     11/14/2024       Lipid Panel:  Lab Results   Component Value Date    CHOL 111 11/14/2024    HDL 41 11/14/2024    LDL 58.00 11/14/2024    TRIG 62 11/14/2024    TOTALCHOLEST 3 11/14/2024        Urine:  Lab Results   Component Value Date    APPEARANCEUA Clear 11/14/2024    SGUA 1.025 11/14/2024    PROTEINUA Negative 11/14/2024    KETONESUA Negative 11/14/2024    LEUKOCYTESUR Negative 11/14/2024    RBCUA 0-5 11/14/2024    WBCUA 0-5 11/14/2024    BACTERIA None Seen 11/14/2024    SQEPUA None Seen 11/14/2024    CREATRANDUR 237.1 (H) 11/14/2024         Assessment         ICD-10-CM ICD-9-CM   1. Cognitive impairment  R41.89 294.9   2. Seronegative rheumatoid arthritis  M06.00 714.0   3. Tremors of nervous system  R25.1 781.0       Plan       Assessment & Plan  Cognitive impairment  Unchanged   Cognitive behavior therapy.         Seronegative rheumatoid arthritis  Pending RA appointment   Con Arava       Tremors of nervous system  Pending neuro   Patient is scheduled [facility] not until June.  Previous evaluated and seen [facility] neuro routine.  Dr. Cardozo / [facility] , patient no longer able to see due to insurance(medicaid )  Family  requesting sooner appointment with Neurology Department           Medication List with Changes/Refills   Current Medications    AMLODIPINE (NORVASC) 10 MG TABLET    Take 1 tablet (10 mg total) by mouth once daily.    ATORVASTATIN (LIPITOR) 40 MG TABLET    Take 1 tablet (40 mg total) by mouth once daily.    BLOOD SUGAR DIAGNOSTIC STRP    1 each by Misc.(Non-Drug; Combo Route) route once daily.    CARBIDOPA-LEVODOPA  MG (SINEMET)  MG PER TABLET    Take 1 tablet by mouth 3 (three) times daily.    CHOLECALCIFEROL, VITAMIN D3, (VITAMIN D3) 50 MCG (2,000 UNIT) CAP CAPSULE    Take 1 capsule (2,000 Units total) by mouth every evening.    ESCITALOPRAM OXALATE (LEXAPRO) 20 MG TABLET    Take 1 tablet (20 mg total) by mouth once daily.    LEFLUNOMIDE (ARAVA) 10 MG TAB    Take 1 tablet (10 mg total) by mouth once daily.    MIRTAZAPINE (REMERON) 30 MG TABLET    Take 1 tablet (30 mg total) by mouth every evening.    MULTIVIT41-IRON-FOLATE8-PS-DHA (ENBRACE HR) 1.5 MG IRON- 8.73 MG-6.4 MG CAPSULE    TAKE 1 CAPSULE by mouth Twice Daily    OLMESARTAN-HYDROCHLOROTHIAZIDE (BENICAR HCT) 20-12.5 MG PER TABLET    TAKE 1 TABLET BY MOUTH DAILY    TIRZEPATIDE (MOUNJARO) 7.5 MG/0.5 ML PNIJ    Inject 7.5 mg into the skin once a week.    VITAMIN B-1, MONONITRATE, 100 MG TAB    Take 100 mg by mouth once daily.    VRAYLAR 1.5 MG CAP    Take 1.5 mg by mouth every morning.   Discontinued Medications    FOLIC ACID (FOLVITE) 1 MG TABLET    Take 1 tablet (1 mg total) by mouth once daily.    MULTIVIT41-IRON-FOLATE8-PS-DHA (ENBRACE HR) 1.5 MG IRON- 8.73 MG-6.4 MG CAPSULE    Take 1 capsule by mouth once daily.    PROPRANOLOL (INDERAL) 10 MG TABLET    Take 1 tablet (10 mg total) by mouth 2 (two) times daily.    THIAMINE 100 MG TABLET    Take 1 tablet (100 mg total) by mouth once daily.    TIRZEPATIDE 10 MG/0.5 ML PNIJ    Inject 10 mg into the skin every 7 days.         Follow up in about 3 months (around 4/29/2025). In addition to their  scheduled follow up, the patient has also been instructed to follow up on as needed basis.   Future Appointments   Date Time Provider Department Center   4/17/2025  8:00 AM Liliana Romano FNP [facility]   4/29/2025  8:45 AM Jama Martinez FNP [facility]   6/12/2025  8:00 AM Margot Llanes MD [facility]   12/2/2025  9:30 AM Jama Martinez FNP [facility]       ANABELLE Berg

## 2025-02-13 DIAGNOSIS — E78.5 HYPERLIPIDEMIA, UNSPECIFIED HYPERLIPIDEMIA TYPE: ICD-10-CM

## 2025-02-13 DIAGNOSIS — E11.9 TYPE 2 DIABETES MELLITUS WITHOUT COMPLICATIONS: ICD-10-CM

## 2025-02-13 RX ORDER — TIRZEPATIDE 7.5 MG/.5ML
7.5 INJECTION, SOLUTION SUBCUTANEOUS WEEKLY
Qty: 2 ML | Refills: 2 | Status: SHIPPED | OUTPATIENT
Start: 2025-02-13

## 2025-02-13 RX ORDER — OLMESARTAN MEDOXOMIL AND HYDROCHLOROTHIAZIDE 20/12.5 20; 12.5 MG/1; MG/1
1 TABLET ORAL DAILY
Qty: 90 TABLET | Refills: 1 | Status: SHIPPED | OUTPATIENT
Start: 2025-02-13

## 2025-04-16 DIAGNOSIS — M06.00 SERONEGATIVE RHEUMATOID ARTHRITIS: ICD-10-CM

## 2025-04-16 DIAGNOSIS — I10 BENIGN ESSENTIAL HYPERTENSION: ICD-10-CM

## 2025-04-16 RX ORDER — AMLODIPINE BESYLATE 10 MG/1
10 TABLET ORAL DAILY
Qty: 30 TABLET | Refills: 2 | Status: SHIPPED | OUTPATIENT
Start: 2025-04-16

## 2025-04-16 RX ORDER — LEFLUNOMIDE 10 MG/1
10 TABLET ORAL DAILY
Qty: 30 TABLET | Refills: 2 | Status: SHIPPED | OUTPATIENT
Start: 2025-04-16

## 2025-04-17 DIAGNOSIS — R41.89 COGNITIVE IMPAIRMENT: ICD-10-CM

## 2025-04-17 RX ORDER — CARBIDOPA AND LEVODOPA 25; 100 MG/1; MG/1
1 TABLET ORAL 3 TIMES DAILY
Qty: 90 TABLET | Refills: 2 | Status: SHIPPED | OUTPATIENT
Start: 2025-04-17

## 2025-05-21 ENCOUNTER — TELEPHONE (OUTPATIENT)
Dept: FAMILY MEDICINE | Facility: CLINIC | Age: 60
End: 2025-05-21
Payer: MEDICAID

## 2025-05-21 NOTE — TELEPHONE ENCOUNTER
Advised Pt son, release of information form has been sent from [facility]. Pt son stated he will fill form out so medical records can be faxed over.

## 2025-06-03 ENCOUNTER — TELEPHONE (OUTPATIENT)
Dept: RHEUMATOLOGY | Facility: CLINIC | Age: 60
End: 2025-06-03
Payer: MEDICAID

## 2025-06-05 ENCOUNTER — HOSPITAL ENCOUNTER (OUTPATIENT)
Dept: RADIOLOGY | Facility: HOSPITAL | Age: 60
Discharge: HOME | End: 2025-06-05
Attending: NURSE PRACTITIONER
Payer: MEDICAID

## 2025-06-05 ENCOUNTER — OFFICE VISIT (OUTPATIENT)
Dept: RHEUMATOLOGY | Facility: CLINIC | Age: 60
End: 2025-06-05
Payer: MEDICAID

## 2025-06-05 VITALS
OXYGEN SATURATION: 96 % | WEIGHT: 210.38 LBS | TEMPERATURE: 99 F | HEIGHT: 72 IN | SYSTOLIC BLOOD PRESSURE: 106 MMHG | BODY MASS INDEX: 28.5 KG/M2 | HEART RATE: 94 BPM | DIASTOLIC BLOOD PRESSURE: 72 MMHG | RESPIRATION RATE: 18 BRPM

## 2025-06-05 DIAGNOSIS — M06.00 SERONEGATIVE RHEUMATOID ARTHRITIS: ICD-10-CM

## 2025-06-05 DIAGNOSIS — M06.00 SERONEGATIVE RHEUMATOID ARTHRITIS: Primary | ICD-10-CM

## 2025-06-05 DIAGNOSIS — Z79.899 DRUG-INDUCED IMMUNODEFICIENCY: ICD-10-CM

## 2025-06-05 DIAGNOSIS — Z79.899 HIGH RISK MEDICATION USE: ICD-10-CM

## 2025-06-05 DIAGNOSIS — M79.642 BILATERAL HAND PAIN: ICD-10-CM

## 2025-06-05 DIAGNOSIS — D84.821 DRUG-INDUCED IMMUNODEFICIENCY: ICD-10-CM

## 2025-06-05 DIAGNOSIS — R25.1 TREMOR OF BOTH HANDS: ICD-10-CM

## 2025-06-05 DIAGNOSIS — R41.89 COGNITIVE IMPAIRMENT: ICD-10-CM

## 2025-06-05 DIAGNOSIS — Z87.891 HISTORY OF TOBACCO USE: ICD-10-CM

## 2025-06-05 DIAGNOSIS — E11.9 TYPE 2 DIABETES MELLITUS WITHOUT COMPLICATION, WITHOUT LONG-TERM CURRENT USE OF INSULIN: ICD-10-CM

## 2025-06-05 DIAGNOSIS — I10 BENIGN ESSENTIAL HYPERTENSION: ICD-10-CM

## 2025-06-05 DIAGNOSIS — E04.1 THYROID NODULE: ICD-10-CM

## 2025-06-05 DIAGNOSIS — M79.641 BILATERAL HAND PAIN: ICD-10-CM

## 2025-06-05 PROCEDURE — 99214 OFFICE O/P EST MOD 30 MIN: CPT | Mod: PBBFAC,25 | Performed by: NURSE PRACTITIONER

## 2025-06-05 PROCEDURE — 99205 OFFICE O/P NEW HI 60 MIN: CPT | Mod: S$PBB,,, | Performed by: NURSE PRACTITIONER

## 2025-06-05 PROCEDURE — 1159F MED LIST DOCD IN RCRD: CPT | Mod: CPTII,,, | Performed by: NURSE PRACTITIONER

## 2025-06-05 PROCEDURE — 3078F DIAST BP <80 MM HG: CPT | Mod: CPTII,,, | Performed by: NURSE PRACTITIONER

## 2025-06-05 PROCEDURE — 3008F BODY MASS INDEX DOCD: CPT | Mod: CPTII,,, | Performed by: NURSE PRACTITIONER

## 2025-06-05 PROCEDURE — 1160F RVW MEDS BY RX/DR IN RCRD: CPT | Mod: CPTII,,, | Performed by: NURSE PRACTITIONER

## 2025-06-05 PROCEDURE — 3074F SYST BP LT 130 MM HG: CPT | Mod: CPTII,,, | Performed by: NURSE PRACTITIONER

## 2025-06-05 PROCEDURE — 73130 X-RAY EXAM OF HAND: CPT | Mod: TC,LT

## 2025-06-05 PROCEDURE — 73130 X-RAY EXAM OF HAND: CPT | Mod: TC,RT

## 2025-06-05 RX ORDER — LEFLUNOMIDE 10 MG/1
TABLET ORAL
Qty: 30 TABLET | Refills: 2 | Status: SHIPPED | OUTPATIENT
Start: 2025-06-05

## 2025-06-05 RX ORDER — DEXTROMETHORPHAN HYDROBROMIDE, BUPROPION HYDROCHLORIDE 105; 45 MG/1; MG/1
TABLET, MULTILAYER, EXTENDED RELEASE ORAL
COMMUNITY
Start: 2025-05-15

## 2025-06-05 RX ORDER — LEFLUNOMIDE 10 MG/1
10 TABLET ORAL DAILY
Qty: 30 TABLET | Refills: 2 | Status: SHIPPED | OUTPATIENT
Start: 2025-06-05 | End: 2025-06-05

## 2025-06-09 ENCOUNTER — RESULTS FOLLOW-UP (OUTPATIENT)
Dept: RHEUMATOLOGY | Facility: CLINIC | Age: 60
End: 2025-06-09
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
## 2025-06-12 ENCOUNTER — PATIENT MESSAGE (OUTPATIENT)
Dept: NEUROLOGY | Facility: CLINIC | Age: 60
End: 2025-06-12
Payer: MEDICAID

## 2025-06-12 ENCOUNTER — PATIENT MESSAGE (OUTPATIENT)
Dept: NEUROLOGY | Facility: CLINIC | Age: 60
End: 2025-06-12

## 2025-06-12 ENCOUNTER — TELEPHONE (OUTPATIENT)
Dept: FAMILY MEDICINE | Facility: CLINIC | Age: 60
End: 2025-06-12
Payer: MEDICAID

## 2025-06-12 ENCOUNTER — OFFICE VISIT (OUTPATIENT)
Dept: NEUROLOGY | Facility: CLINIC | Age: 60
End: 2025-06-12
Payer: MEDICAID

## 2025-06-12 VITALS
SYSTOLIC BLOOD PRESSURE: 108 MMHG | RESPIRATION RATE: 18 BRPM | OXYGEN SATURATION: 96 % | TEMPERATURE: 98 F | BODY MASS INDEX: 28.85 KG/M2 | HEART RATE: 79 BPM | DIASTOLIC BLOOD PRESSURE: 72 MMHG | WEIGHT: 213 LBS | HEIGHT: 72 IN

## 2025-06-12 DIAGNOSIS — G31.83 MODERATE LEWY BODY DEMENTIA WITH ANXIETY: Primary | ICD-10-CM

## 2025-06-12 DIAGNOSIS — F02.B4: ICD-10-CM

## 2025-06-12 DIAGNOSIS — F02.B4 MODERATE LEWY BODY DEMENTIA WITH ANXIETY: Primary | ICD-10-CM

## 2025-06-12 DIAGNOSIS — E11.9 TYPE 2 DIABETES MELLITUS WITHOUT COMPLICATIONS: ICD-10-CM

## 2025-06-12 DIAGNOSIS — G90.3 NEUROGENIC ORTHOSTATIC HYPOTENSION: ICD-10-CM

## 2025-06-12 DIAGNOSIS — R41.89 COGNITIVE IMPAIRMENT: ICD-10-CM

## 2025-06-12 PROCEDURE — 99215 OFFICE O/P EST HI 40 MIN: CPT | Mod: PBBFAC | Performed by: PSYCHIATRY & NEUROLOGY

## 2025-06-12 RX ORDER — CARBIDOPA AND LEVODOPA 25; 100 MG/1; MG/1
1 TABLET ORAL 3 TIMES DAILY
Qty: 90 TABLET | Refills: 5 | Status: SHIPPED | OUTPATIENT
Start: 2025-06-12 | End: 2025-12-09

## 2025-06-12 RX ORDER — LANOLIN ALCOHOL/MO/W.PET/CERES
1 CREAM (GRAM) TOPICAL
COMMUNITY

## 2025-06-12 RX ORDER — TIRZEPATIDE 7.5 MG/.5ML
7.5 INJECTION, SOLUTION SUBCUTANEOUS WEEKLY
Qty: 2 ML | Refills: 2 | Status: SHIPPED | OUTPATIENT
Start: 2025-06-12

## 2025-06-12 RX ORDER — UBIDECARENONE 30 MG
30 CAPSULE ORAL DAILY
COMMUNITY

## 2025-06-12 RX ORDER — AMLODIPINE BESYLATE 10 MG/1
10 TABLET ORAL DAILY
COMMUNITY
Start: 2025-06-11

## 2025-06-16 NOTE — TELEPHONE ENCOUNTER
Please send a copy of our AD resources folder to the email provided by Haydee in previous message; thanks Sirisha

## 2025-08-20 ENCOUNTER — TELEPHONE (OUTPATIENT)
Dept: FAMILY MEDICINE | Facility: CLINIC | Age: 60
End: 2025-08-20
Payer: MEDICAID

## 2025-08-20 DIAGNOSIS — E11.9 TYPE 2 DIABETES MELLITUS WITHOUT COMPLICATIONS: Primary | ICD-10-CM

## 2025-08-20 RX ORDER — TIRZEPATIDE 7.5 MG/.5ML
7.5 INJECTION, SOLUTION SUBCUTANEOUS WEEKLY
Qty: 2 ML | Refills: 2 | Status: SHIPPED | OUTPATIENT
Start: 2025-08-20

## 2025-08-26 ENCOUNTER — TELEPHONE (OUTPATIENT)
Dept: FAMILY MEDICINE | Facility: CLINIC | Age: 60
End: 2025-08-26
Payer: MEDICAID

## 2025-09-05 ENCOUNTER — TELEPHONE (OUTPATIENT)
Dept: FAMILY MEDICINE | Facility: CLINIC | Age: 60
End: 2025-09-05
Payer: MEDICAID

## 2025-09-05 ENCOUNTER — OFFICE VISIT (OUTPATIENT)
Dept: RHEUMATOLOGY | Facility: CLINIC | Age: 60
End: 2025-09-05
Payer: MEDICAID

## 2025-09-05 VITALS
TEMPERATURE: 98 F | OXYGEN SATURATION: 100 % | HEART RATE: 84 BPM | RESPIRATION RATE: 18 BRPM | SYSTOLIC BLOOD PRESSURE: 113 MMHG | HEIGHT: 72 IN | WEIGHT: 232 LBS | DIASTOLIC BLOOD PRESSURE: 77 MMHG | BODY MASS INDEX: 31.42 KG/M2

## 2025-09-05 DIAGNOSIS — E11.9 TYPE 2 DIABETES MELLITUS WITHOUT COMPLICATION, WITHOUT LONG-TERM CURRENT USE OF INSULIN: ICD-10-CM

## 2025-09-05 DIAGNOSIS — E11.9 TYPE 2 DIABETES MELLITUS WITHOUT COMPLICATION, WITHOUT LONG-TERM CURRENT USE OF INSULIN: Primary | ICD-10-CM

## 2025-09-05 DIAGNOSIS — M06.00 SERONEGATIVE RHEUMATOID ARTHRITIS: Primary | ICD-10-CM

## 2025-09-05 DIAGNOSIS — E04.1 THYROID NODULE: ICD-10-CM

## 2025-09-05 DIAGNOSIS — F02.B4 MODERATE LEWY BODY DEMENTIA WITH ANXIETY: ICD-10-CM

## 2025-09-05 DIAGNOSIS — Z87.898 HISTORY OF ALCOHOL USE: ICD-10-CM

## 2025-09-05 DIAGNOSIS — Z79.899 HIGH RISK MEDICATION USE: ICD-10-CM

## 2025-09-05 DIAGNOSIS — I10 BENIGN ESSENTIAL HYPERTENSION: ICD-10-CM

## 2025-09-05 DIAGNOSIS — D84.821 DRUG-INDUCED IMMUNODEFICIENCY: ICD-10-CM

## 2025-09-05 DIAGNOSIS — Z79.899 DRUG-INDUCED IMMUNODEFICIENCY: ICD-10-CM

## 2025-09-05 DIAGNOSIS — Z87.891 HISTORY OF TOBACCO USE: ICD-10-CM

## 2025-09-05 DIAGNOSIS — R41.89 COGNITIVE IMPAIRMENT: ICD-10-CM

## 2025-09-05 DIAGNOSIS — G31.83 MODERATE LEWY BODY DEMENTIA WITH ANXIETY: ICD-10-CM

## 2025-09-05 PROCEDURE — 99214 OFFICE O/P EST MOD 30 MIN: CPT | Mod: PBBFAC | Performed by: NURSE PRACTITIONER

## 2025-09-05 RX ORDER — LEFLUNOMIDE 10 MG/1
TABLET ORAL
Qty: 90 TABLET | Refills: 1 | Status: SHIPPED | OUTPATIENT
Start: 2025-09-05

## 2025-09-05 RX ORDER — AMLODIPINE BESYLATE 5 MG/1
5 TABLET ORAL
COMMUNITY
Start: 2025-08-12